# Patient Record
Sex: FEMALE | Race: WHITE
[De-identification: names, ages, dates, MRNs, and addresses within clinical notes are randomized per-mention and may not be internally consistent; named-entity substitution may affect disease eponyms.]

---

## 2018-01-24 ENCOUNTER — HOSPITAL ENCOUNTER (EMERGENCY)
Age: 73
Discharge: HOME | End: 2018-01-24
Payer: SELF-PAY

## 2018-01-24 VITALS
TEMPERATURE: 98.96 F | SYSTOLIC BLOOD PRESSURE: 168 MMHG | OXYGEN SATURATION: 95 % | RESPIRATION RATE: 18 BRPM | DIASTOLIC BLOOD PRESSURE: 81 MMHG | HEART RATE: 92 BPM

## 2018-01-24 VITALS — BODY MASS INDEX: 27.4 KG/M2

## 2018-01-24 DIAGNOSIS — Z79.4: ICD-10-CM

## 2018-01-24 DIAGNOSIS — E11.9: ICD-10-CM

## 2018-01-24 DIAGNOSIS — N30.00: Primary | ICD-10-CM

## 2018-01-24 LAB
ALBUMIN LEVEL: 3.6 GM/DL (ref 3.4–5)
ALBUMIN/GLOB SERPL: 0.9 {RATIO} (ref 1.1–1.8)
ALP ISO SERPL-ACNC: 38 U/L (ref 46–116)
ALT SERPLBLD-CCNC: 34 U/L (ref 12–78)
AMYLASE SERPL-CCNC: 52 U/L (ref 25–125)
ANION GAP SERPL CALC-SCNC: 14.9 MEQ/L (ref 5–15)
AST SERPL QL: 34 U/L (ref 15–37)
BACTERIA URNS QL MICRO: (no result) /LPF
BILIRUBIN,TOTAL: 0.4 MG/DL (ref 0.2–1)
BUN SERPL-MCNC: 12 MG/DL (ref 7–18)
CALCIUM SPEC-MCNC: 8.5 MG/DL (ref 8.5–10.1)
CHLORIDE SPEC-SCNC: 89 MMOL/L (ref 98–107)
CO2 SERPL-SCNC: 27 MMOL/L (ref 21–32)
COLOR UR: YELLOW
CREAT BLD-SCNC: 0.88 MG/DL (ref 0.55–1.02)
CREATININE CLEARANCE ESTIMATED: 58 ML/MIN (ref 0–300)
ESTIMATED GLOMERULAR FILT RATE: 63 ML/MIN (ref 60–?)
GFR (AFRICAN AMERICAN): 76 ML/MIN (ref 60–?)
GLOBULIN SER CALC-MCNC: 4 GM/DL (ref 1.3–3.2)
GLUCOSE: 136 MG/DL (ref 74–106)
HCT VFR BLD CALC: 36.9 % (ref 37–47)
HGB BLD-MCNC: 12.3 G/DL (ref 12.2–16.2)
KETONES UR STRIP.AUTO-MCNC: (no result) MG/DL
LEUKOCYTE ESTERASE UR QL STRIP: (no result)
LIPASE: 243 U/L (ref 73–393)
MCHC RBC-ENTMCNC: 33.4 G/DL (ref 31.8–35.4)
MCV RBC: 87.6 FL (ref 81–99)
MEAN CORPUSCULAR HEMOGLOBIN: 29.2 PG (ref 27–31.2)
MICRO URNS: (no result)
PH UR: 6 [PH] (ref 5–8.5)
PLATELET # BLD: 175 K/MM3 (ref 142–424)
POTASSIUM: 3.9 MMOL/L (ref 3.5–5.1)
PROT SERPL-MCNC: 7.6 GM/DL (ref 6.4–8.2)
RBC # BLD AUTO: 4.22 M/MM3 (ref 4.2–5.4)
SODIUM SPEC-SCNC: 127 MMOL/L (ref 136–145)
SP GR UR: 1.01 (ref 1–1.03)
TRANS CELLS URNS QL MICRO: (no result) #/LPF (ref 0–3)
UROBILINOGEN UR QL: 0.2 EU/DL
WBC # BLD AUTO: 5.2 K/MM3 (ref 4.8–10.8)
WBC # UR: (no result) #/HPF (ref 0–3)

## 2018-01-24 PROCEDURE — 99283 EMERGENCY DEPT VISIT LOW MDM: CPT

## 2018-01-24 PROCEDURE — 85025 COMPLETE CBC W/AUTO DIFF WBC: CPT

## 2018-01-24 PROCEDURE — 87086 URINE CULTURE/COLONY COUNT: CPT

## 2018-01-24 PROCEDURE — 96367 TX/PROPH/DG ADDL SEQ IV INF: CPT

## 2018-01-24 PROCEDURE — 74176 CT ABD & PELVIS W/O CONTRAST: CPT

## 2018-01-24 PROCEDURE — 87186 SC STD MICRODIL/AGAR DIL: CPT

## 2018-01-24 PROCEDURE — 87088 URINE BACTERIA CULTURE: CPT

## 2018-01-24 PROCEDURE — 80053 COMPREHEN METABOLIC PANEL: CPT

## 2018-01-24 PROCEDURE — 96375 TX/PRO/DX INJ NEW DRUG ADDON: CPT

## 2018-01-24 PROCEDURE — 81001 URINALYSIS AUTO W/SCOPE: CPT

## 2018-01-24 PROCEDURE — 96365 THER/PROPH/DIAG IV INF INIT: CPT

## 2018-01-24 PROCEDURE — 83690 ASSAY OF LIPASE: CPT

## 2018-01-24 PROCEDURE — 82150 ASSAY OF AMYLASE: CPT

## 2020-07-10 ENCOUNTER — INPATIENT HOSPITAL (OUTPATIENT)
Dept: RURAL HOSPITAL 5 | Facility: HOSPITAL | Age: 75
End: 2020-07-10

## 2020-07-10 DIAGNOSIS — K81.9 CHOLECYSTITIS, UNSPECIFIED: ICD-10-CM

## 2020-07-10 DIAGNOSIS — I10 ESSENTIAL (PRIMARY) HYPERTENSION: ICD-10-CM

## 2020-07-10 DIAGNOSIS — I42.9 CARDIOMYOPATHY, UNSPECIFIED: ICD-10-CM

## 2020-07-10 DIAGNOSIS — R94.5 ABNORMAL RESULTS OF LIVER FUNCTION STUDIES: ICD-10-CM

## 2020-07-10 DIAGNOSIS — E11.9 TYPE 2 DIABETES MELLITUS WITHOUT COMPLICATIONS: ICD-10-CM

## 2020-07-10 DIAGNOSIS — N18.9 CHRONIC KIDNEY DISEASE, UNSPECIFIED: ICD-10-CM

## 2020-07-10 PROCEDURE — 99253 IP/OBS CNSLTJ NEW/EST LOW 45: CPT | Performed by: INTERNAL MEDICINE

## 2020-09-24 ENCOUNTER — CONSULT (OUTPATIENT)
Dept: CARDIOLOGY | Facility: CLINIC | Age: 75
End: 2020-09-24

## 2020-09-24 VITALS
WEIGHT: 179 LBS | OXYGEN SATURATION: 98 % | BODY MASS INDEX: 27.13 KG/M2 | HEIGHT: 68 IN | DIASTOLIC BLOOD PRESSURE: 54 MMHG | SYSTOLIC BLOOD PRESSURE: 103 MMHG | HEART RATE: 128 BPM

## 2020-09-24 DIAGNOSIS — E78.2 MIXED HYPERLIPIDEMIA: ICD-10-CM

## 2020-09-24 DIAGNOSIS — I48.92 ATRIAL FLUTTER, UNSPECIFIED TYPE (HCC): Primary | ICD-10-CM

## 2020-09-24 DIAGNOSIS — Z87.74 HISTORY OF REPAIR OF CONGENITAL ATRIAL SEPTAL DEFECT (ASD): ICD-10-CM

## 2020-09-24 DIAGNOSIS — I10 ESSENTIAL HYPERTENSION: ICD-10-CM

## 2020-09-24 PROCEDURE — 93000 ELECTROCARDIOGRAM COMPLETE: CPT | Performed by: INTERNAL MEDICINE

## 2020-09-24 PROCEDURE — 99203 OFFICE O/P NEW LOW 30 MIN: CPT | Performed by: INTERNAL MEDICINE

## 2020-09-24 RX ORDER — ASPIRIN 81 MG/1
81 TABLET ORAL DAILY
COMMUNITY
End: 2021-01-18

## 2020-09-24 RX ORDER — INSULIN GLARGINE 100 [IU]/ML
50 INJECTION, SOLUTION SUBCUTANEOUS DAILY
COMMUNITY
End: 2022-07-15 | Stop reason: HOSPADM

## 2020-09-24 RX ORDER — TRAZODONE HYDROCHLORIDE 50 MG/1
50 TABLET ORAL NIGHTLY
Status: ON HOLD | COMMUNITY
End: 2020-10-06

## 2020-09-24 RX ORDER — LISINOPRIL 10 MG/1
10 TABLET ORAL DAILY
COMMUNITY
End: 2020-11-16 | Stop reason: DRUGHIGH

## 2020-09-24 RX ORDER — FUROSEMIDE 20 MG/1
40 TABLET ORAL DAILY
COMMUNITY
End: 2022-07-15 | Stop reason: HOSPADM

## 2020-09-24 RX ORDER — SULFAMETHOXAZOLE AND TRIMETHOPRIM 800; 160 MG/1; MG/1
800 TABLET ORAL DAILY
Status: ON HOLD | COMMUNITY
Start: 2020-09-19 | End: 2020-10-06

## 2020-09-24 NOTE — PROGRESS NOTES
[facility] at [facility]  INITIAL OFFICE CONSULT    Ivory Carr  : 1945  MRN:[id number]  Patient Address: [address] [address]    Date of Encounter: 2020    PCP: Christiane Peter, APRN  [address]  [facility] [address]  Referring MD: Christiane Peter    IDENTIFICATION: A 75 y.o. female resident of [place], [place]     Chief Complaint   Patient presents with   • Atrial Flutter     PROBLEM LIST:   1. Paroxysmal atrial flutter  a. Diagnosed approximately   b. ECV x3, last in 2020  c. Echo 2020: biatrial enlargement, normal LVSF with abnormal septal motion;  moderately enlarged RV with normal contractility, mild MR, mod TR  d. Recurrent atrial flutter with RVR August and 2020   2. History of surgical ASD repair, , [place]  3. Hypertension   4. Hyperlipidemia intolerant to Lipitor   5. DM2 with peripheral neuropathy   6. Varicose veins   7. Lumbar spine disease  8. Recent cholecystitis/pancreatitis, s/p cholecystectomy 2020     ALLERGIES: No Known Allergies    CURRENT MEDICATIONS:   •  aspirin 81 MG EC tablet, Take 81 mg by mouth Daily  •  furosemide (LASIX) 20 MG tablet, daily   •  insulin glargine (LANTUS) 100 UNIT/ML injection, Inject  under the skin into the appropriate area as directed Daily  •  lisinopril (PRINIVIL,ZESTRIL) 10 MG tablet, Take 10 mg by mouth Daily.  •  rivaroxaban (XARELTO) 20 MG tablet, Take 20 mg by mouth Daily.  •  traZODone (DESYREL) 50 MG tablet, Take 50 mg by mouth Every Night. Half tablet at night  •  sulfamethoxazole-trimethoprim (BACTRIM DS,SEPTRA DS) 800-160 MG per tablet, Take 800 mg by mouth Daily.    HPI: Mrs. Carr is a pleasant 76 y/o WF with above noted history who presents as a second opinion for PAF with RVR. She was diagnosed with atrial fibrillation/flutter about 3 years ago, and has been seen by Dr. De León. She has had 3 cardioversions, with last one in March, however she has had  "recurrent atrial flutter with RVR. She is symptomatic with fatigue/low energy and dyspnea as well as palpitations. She as well reports a remote history of surgical ASD repair in 1990 in [place]. She denies history of MI, CVA, DVT/PE or rheumatic fever. She is unable to do her housework due to her current symptoms. She has previously failed some medicines for Afib/flutter, however she cannot recall what these are at the current time. She believes she could not take Metoprolol due to hypotension. She denies tobacco, alcohol or drug use. No significant family history.     Cardiac Risk Factors include: advanced age (older than 55 for men, 65 for women), diabetes mellitus, dyslipidemia, hypertension and sedentary lifestyle    ROS: All systems have been reviewed and are negative with the exception of those mentioned in the HPI and problem list above.    Surgical History:   Past Surgical History:   Procedure Laterality Date   • CHOLECYSTECTOMY         Social History:   Social History     Socioeconomic History   • Marital status:    Tobacco Use   • Smoking status: Never Smoker   • Smokeless tobacco: Never Used   Substance and Sexual Activity   • Alcohol use: Never     Frequency: Never   • Drug use: Never   • Sexual activity: Defer     Family History:   Family History   Problem Relation Age of Onset   • Hypertension Mother    • Hyperlipidemia Mother        Objective     Vitals:    09/24/20 1235 09/24/20 1238 09/24/20 1240   BP: 95/72 96/59 103/54   BP Location: Right arm Left arm Left arm   Patient Position: Sitting Standing Standing   Pulse: (!) 128 (!) 130 (!) 128   SpO2: 98% 98%    Weight: 81.2 kg (179 lb)     Height: 172.7 cm (68\")       Body mass index is 27.22 kg/m².    PHYSICAL EXAM:  CONSTITUTIONAL: Well nourished, cooperative, in no acute distress  HEENT: Normocephalic, atraumatic, PERRLA, no JVD  CARDIOVASCULAR:  Regular rhythm and fast rate, no murmur, gallop, rub. Pedal pulses are not palpable however no " evidence of threatened tissue loss   RESPIRATORY: Clear to auscultation, normal respiratory effort, no wheezing, rales or ronchi  GI: Soft, nontender, normal bowel sounds  MUSCULOSKELETAL: No gross deformities, no edema  SKIN: Warm, dry. No bleeding, bruising or rash  NEUROLOGICAL: No focal deficits  PSYCHIATRIC: Normal mood and affect. Behavior is normal     Labs/Diagnostic Data  Labs 8/21/20:  CMP: Na 126, K 5.1, Cl 90, CO2 27, BUN 25, Cr 1.3, eGFR 40, Glucose 348, Alk Phos 49, AST 24, ALT 19  CBC: WBC 7, RBC 4.15, Hgb 11.7, Hct 35.7, Plt 217  Troponin: 0.01   09/2019  A1C: 9.8% 04/2020      ECG 12 Lead    Date/Time: 9/24/2020 1:36 PM  Performed by: Sheryl Baig PA-C  Authorized by: Sheryl Baig PA-C   Comparison: compared with previous ECG from 7/27/2020  Comparison to previous ECG: Aflutter with 2:1 block  Rhythm: atrial flutter  Rate: tachycardic  BPM: 128    Clinical impression: abnormal EKG  Comments: Aflutter with 2:1 AV block         Radiology Data:   CXR 9/18/20: Lingular infiltrate vs atelectasis along with borderline COPD     Assessment and Plan:     1. Aflutter with RVR: she needs EP consult for possible ablation. We discussed case with Dr. Garcia who was kind enough to see her today. After discussion with patient and her , decision to proceed with Aflutter ablation was made and we will arrange this as soon as able. Continue current medicines in the meantime.     Thank you for allowing me to participate in the care of Ivory Carr. Feel free to contact me directly with any further questions or concerns.      Scribed for Irvin Mccray MD by Sheryl Baig PA-C. 9/24/2020  13:56 EDT

## 2020-09-30 ENCOUNTER — PREP FOR SURGERY (OUTPATIENT)
Dept: OTHER | Facility: HOSPITAL | Age: 75
End: 2020-09-30

## 2020-09-30 DIAGNOSIS — I48.3 TYPICAL ATRIAL FLUTTER (HCC): Primary | ICD-10-CM

## 2020-09-30 RX ORDER — ONDANSETRON 2 MG/ML
4 INJECTION INTRAMUSCULAR; INTRAVENOUS EVERY 6 HOURS PRN
Status: CANCELLED | OUTPATIENT
Start: 2020-09-30

## 2020-09-30 RX ORDER — ACETAMINOPHEN 325 MG/1
650 TABLET ORAL EVERY 4 HOURS PRN
Status: CANCELLED | OUTPATIENT
Start: 2020-09-30

## 2020-09-30 RX ORDER — NITROGLYCERIN 0.4 MG/1
0.4 TABLET SUBLINGUAL
Status: CANCELLED | OUTPATIENT
Start: 2020-09-30

## 2020-09-30 RX ORDER — SODIUM CHLORIDE 0.9 % (FLUSH) 0.9 %
3 SYRINGE (ML) INJECTION EVERY 12 HOURS SCHEDULED
Status: CANCELLED | OUTPATIENT
Start: 2020-09-30

## 2020-09-30 RX ORDER — SODIUM CHLORIDE 0.9 % (FLUSH) 0.9 %
10 SYRINGE (ML) INJECTION AS NEEDED
Status: CANCELLED | OUTPATIENT
Start: 2020-09-30

## 2020-10-05 PROCEDURE — 99284 EMERGENCY DEPT VISIT MOD MDM: CPT

## 2020-10-05 RX ORDER — SODIUM CHLORIDE 0.9 % (FLUSH) 0.9 %
10 SYRINGE (ML) INJECTION AS NEEDED
Status: DISCONTINUED | OUTPATIENT
Start: 2020-10-05 | End: 2020-10-12 | Stop reason: HOSPADM

## 2020-10-06 ENCOUNTER — APPOINTMENT (OUTPATIENT)
Dept: CARDIOLOGY | Facility: HOSPITAL | Age: 75
End: 2020-10-06

## 2020-10-06 ENCOUNTER — HOSPITAL ENCOUNTER (INPATIENT)
Facility: HOSPITAL | Age: 75
LOS: 6 days | Discharge: HOME | End: 2020-10-12
Attending: EMERGENCY MEDICINE | Admitting: INTERNAL MEDICINE

## 2020-10-06 ENCOUNTER — APPOINTMENT (OUTPATIENT)
Dept: GENERAL RADIOLOGY | Facility: HOSPITAL | Age: 75
End: 2020-10-06

## 2020-10-06 ENCOUNTER — APPOINTMENT (OUTPATIENT)
Dept: CT IMAGING | Facility: HOSPITAL | Age: 75
End: 2020-10-06

## 2020-10-06 DIAGNOSIS — R11.0 NAUSEA: ICD-10-CM

## 2020-10-06 DIAGNOSIS — I07.1 SEVERE TRICUSPID REGURGITATION: ICD-10-CM

## 2020-10-06 DIAGNOSIS — N28.9 ACUTE RENAL INSUFFICIENCY: ICD-10-CM

## 2020-10-06 DIAGNOSIS — Z79.01 CHRONIC ANTICOAGULATION: ICD-10-CM

## 2020-10-06 DIAGNOSIS — Z86.39 HISTORY OF DIABETES MELLITUS: ICD-10-CM

## 2020-10-06 DIAGNOSIS — R60.1 ANASARCA: ICD-10-CM

## 2020-10-06 DIAGNOSIS — E87.1 HYPONATREMIA: ICD-10-CM

## 2020-10-06 DIAGNOSIS — Z87.74 HISTORY OF REPAIR OF CONGENITAL ATRIAL SEPTAL DEFECT (ASD): ICD-10-CM

## 2020-10-06 DIAGNOSIS — Z90.49 HISTORY OF CHOLECYSTECTOMY: ICD-10-CM

## 2020-10-06 DIAGNOSIS — N39.0 ACUTE UTI: Primary | ICD-10-CM

## 2020-10-06 DIAGNOSIS — I48.92 PAROXYSMAL ATRIAL FLUTTER (HCC): ICD-10-CM

## 2020-10-06 DIAGNOSIS — R18.8 OTHER ASCITES: ICD-10-CM

## 2020-10-06 DIAGNOSIS — R14.0 ABDOMINAL DISTENTION: ICD-10-CM

## 2020-10-06 PROBLEM — N18.9 CKD (CHRONIC KIDNEY DISEASE): Status: ACTIVE | Noted: 2020-10-06

## 2020-10-06 PROBLEM — E11.9 DM2 (DIABETES MELLITUS, TYPE 2): Status: ACTIVE | Noted: 2020-10-06

## 2020-10-06 PROBLEM — D64.9 ANEMIA: Status: ACTIVE | Noted: 2020-10-06

## 2020-10-06 PROBLEM — R79.89 ELEVATED SERUM CREATININE: Status: ACTIVE | Noted: 2020-10-06

## 2020-10-06 LAB
ALBUMIN SERPL-MCNC: 3.8 G/DL (ref 3.5–5.2)
ALBUMIN/GLOB SERPL: 1.2 G/DL
ALP SERPL-CCNC: 99 U/L (ref 39–117)
ALT SERPL W P-5'-P-CCNC: 14 U/L (ref 1–33)
ANION GAP SERPL CALCULATED.3IONS-SCNC: 10 MMOL/L (ref 5–15)
APTT PPP: 43.4 SECONDS (ref 24–37)
AST SERPL-CCNC: 23 U/L (ref 1–32)
BACTERIA UR QL AUTO: ABNORMAL /HPF
BASOPHILS # BLD AUTO: 0.02 10*3/MM3 (ref 0–0.2)
BASOPHILS NFR BLD AUTO: 0.3 % (ref 0–1.5)
BILIRUB SERPL-MCNC: 0.3 MG/DL (ref 0–1.2)
BILIRUB UR QL STRIP: NEGATIVE
BUN SERPL-MCNC: 29 MG/DL (ref 8–23)
BUN/CREAT SERPL: 24.6 (ref 7–25)
CALCIUM SPEC-SCNC: 8.8 MG/DL (ref 8.6–10.5)
CHLORIDE SERPL-SCNC: 83 MMOL/L (ref 98–107)
CLARITY UR: ABNORMAL
CO2 SERPL-SCNC: 27 MMOL/L (ref 22–29)
COLOR UR: YELLOW
CREAT SERPL-MCNC: 1.18 MG/DL (ref 0.57–1)
DEPRECATED RDW RBC AUTO: 44 FL (ref 37–54)
EOSINOPHIL # BLD AUTO: 0.1 10*3/MM3 (ref 0–0.4)
EOSINOPHIL NFR BLD AUTO: 1.3 % (ref 0.3–6.2)
ERYTHROCYTE [DISTWIDTH] IN BLOOD BY AUTOMATED COUNT: 14.9 % (ref 12.3–15.4)
GFR SERPL CREATININE-BSD FRML MDRD: 44 ML/MIN/1.73
GLOBULIN UR ELPH-MCNC: 3.3 GM/DL
GLUCOSE BLDC GLUCOMTR-MCNC: 158 MG/DL (ref 70–130)
GLUCOSE BLDC GLUCOMTR-MCNC: 298 MG/DL (ref 70–130)
GLUCOSE BLDC GLUCOMTR-MCNC: 330 MG/DL (ref 70–130)
GLUCOSE BLDC GLUCOMTR-MCNC: 332 MG/DL (ref 70–130)
GLUCOSE SERPL-MCNC: 217 MG/DL (ref 65–99)
GLUCOSE UR STRIP-MCNC: NEGATIVE MG/DL
HAV IGM SERPL QL IA: NORMAL
HBV CORE IGM SERPL QL IA: NORMAL
HBV SURFACE AG SERPL QL IA: NORMAL
HCT VFR BLD AUTO: 31.2 % (ref 34–46.6)
HCV AB SER DONR QL: NORMAL
HGB BLD-MCNC: 10.1 G/DL (ref 12–15.9)
HGB UR QL STRIP.AUTO: ABNORMAL
HOLD SPECIMEN: NORMAL
HOLD SPECIMEN: NORMAL
HYALINE CASTS UR QL AUTO: ABNORMAL /LPF
IMM GRANULOCYTES # BLD AUTO: 0.07 10*3/MM3 (ref 0–0.05)
IMM GRANULOCYTES NFR BLD AUTO: 0.9 % (ref 0–0.5)
INR PPP: 2.2 (ref 0.85–1.16)
KETONES UR QL STRIP: NEGATIVE
LDH SERPL-CCNC: 235 U/L (ref 135–214)
LEUKOCYTE ESTERASE UR QL STRIP.AUTO: ABNORMAL
LIPASE SERPL-CCNC: 89 U/L (ref 13–60)
LYMPHOCYTES # BLD AUTO: 1.69 10*3/MM3 (ref 0.7–3.1)
LYMPHOCYTES NFR BLD AUTO: 21.8 % (ref 19.6–45.3)
MAGNESIUM SERPL-MCNC: 2 MG/DL (ref 1.6–2.4)
MCH RBC QN AUTO: 26.5 PG (ref 26.6–33)
MCHC RBC AUTO-ENTMCNC: 32.4 G/DL (ref 31.5–35.7)
MCV RBC AUTO: 81.9 FL (ref 79–97)
MONOCYTES # BLD AUTO: 0.45 10*3/MM3 (ref 0.1–0.9)
MONOCYTES NFR BLD AUTO: 5.8 % (ref 5–12)
NEUTROPHILS NFR BLD AUTO: 5.44 10*3/MM3 (ref 1.7–7)
NEUTROPHILS NFR BLD AUTO: 69.9 % (ref 42.7–76)
NITRITE UR QL STRIP: NEGATIVE
NRBC BLD AUTO-RTO: 0 /100 WBC (ref 0–0.2)
NT-PROBNP SERPL-MCNC: 2518 PG/ML (ref 0–1800)
OSMOLALITY UR: 292 MOSM/KG (ref 300–1100)
PH UR STRIP.AUTO: <=5 [PH] (ref 5–8)
PLATELET # BLD AUTO: 342 10*3/MM3 (ref 140–450)
PMV BLD AUTO: 10.6 FL (ref 6–12)
POTASSIUM SERPL-SCNC: 4.6 MMOL/L (ref 3.5–5.2)
PROT SERPL-MCNC: 7.1 G/DL (ref 6–8.5)
PROT UR QL STRIP: ABNORMAL
PROTHROMBIN TIME: 24.2 SECONDS (ref 11.5–14)
RBC # BLD AUTO: 3.81 10*6/MM3 (ref 3.77–5.28)
RBC # UR: ABNORMAL /HPF
REF LAB TEST METHOD: ABNORMAL
SARS-COV-2 RNA RESP QL NAA+PROBE: NOT DETECTED
SODIUM SERPL-SCNC: 120 MMOL/L (ref 136–145)
SODIUM UR-SCNC: <20 MMOL/L
SP GR UR STRIP: 1.01 (ref 1–1.03)
SQUAMOUS #/AREA URNS HPF: ABNORMAL /HPF
UROBILINOGEN UR QL STRIP: ABNORMAL
WBC # BLD AUTO: 7.77 10*3/MM3 (ref 3.4–10.8)
WBC UR QL AUTO: ABNORMAL /HPF
WHOLE BLOOD HOLD SPECIMEN: NORMAL
WHOLE BLOOD HOLD SPECIMEN: NORMAL

## 2020-10-06 PROCEDURE — 83880 ASSAY OF NATRIURETIC PEPTIDE: CPT | Performed by: FAMILY MEDICINE

## 2020-10-06 PROCEDURE — 71045 X-RAY EXAM CHEST 1 VIEW: CPT

## 2020-10-06 PROCEDURE — 63710000001 INSULIN DETEMIR PER 5 UNITS: Performed by: INTERNAL MEDICINE

## 2020-10-06 PROCEDURE — 25010000002 CEFTRIAXONE PER 250 MG: Performed by: PHYSICIAN ASSISTANT

## 2020-10-06 PROCEDURE — 85025 COMPLETE CBC W/AUTO DIFF WBC: CPT | Performed by: EMERGENCY MEDICINE

## 2020-10-06 PROCEDURE — 99254 IP/OBS CNSLTJ NEW/EST MOD 60: CPT | Performed by: INTERNAL MEDICINE

## 2020-10-06 PROCEDURE — 93306 TTE W/DOPPLER COMPLETE: CPT | Performed by: INTERNAL MEDICINE

## 2020-10-06 PROCEDURE — 80074 ACUTE HEPATITIS PANEL: CPT | Performed by: FAMILY MEDICINE

## 2020-10-06 PROCEDURE — 83735 ASSAY OF MAGNESIUM: CPT | Performed by: FAMILY MEDICINE

## 2020-10-06 PROCEDURE — 25010000002 ONDANSETRON PER 1 MG: Performed by: NURSE PRACTITIONER

## 2020-10-06 PROCEDURE — 82962 GLUCOSE BLOOD TEST: CPT

## 2020-10-06 PROCEDURE — 63710000001 INSULIN LISPRO (HUMAN) PER 5 UNITS: Performed by: PHYSICIAN ASSISTANT

## 2020-10-06 PROCEDURE — 83690 ASSAY OF LIPASE: CPT | Performed by: EMERGENCY MEDICINE

## 2020-10-06 PROCEDURE — 84300 ASSAY OF URINE SODIUM: CPT | Performed by: PHYSICIAN ASSISTANT

## 2020-10-06 PROCEDURE — 85730 THROMBOPLASTIN TIME PARTIAL: CPT | Performed by: FAMILY MEDICINE

## 2020-10-06 PROCEDURE — 99223 1ST HOSP IP/OBS HIGH 75: CPT | Performed by: FAMILY MEDICINE

## 2020-10-06 PROCEDURE — 63710000001 INSULIN LISPRO (HUMAN) PER 5 UNITS: Performed by: INTERNAL MEDICINE

## 2020-10-06 PROCEDURE — 87086 URINE CULTURE/COLONY COUNT: CPT | Performed by: PHYSICIAN ASSISTANT

## 2020-10-06 PROCEDURE — 93306 TTE W/DOPPLER COMPLETE: CPT

## 2020-10-06 PROCEDURE — 83615 LACTATE (LD) (LDH) ENZYME: CPT | Performed by: FAMILY MEDICINE

## 2020-10-06 PROCEDURE — 87635 SARS-COV-2 COVID-19 AMP PRB: CPT | Performed by: PHYSICIAN ASSISTANT

## 2020-10-06 PROCEDURE — 80053 COMPREHEN METABOLIC PANEL: CPT | Performed by: EMERGENCY MEDICINE

## 2020-10-06 PROCEDURE — 97165 OT EVAL LOW COMPLEX 30 MIN: CPT

## 2020-10-06 PROCEDURE — 81001 URINALYSIS AUTO W/SCOPE: CPT | Performed by: EMERGENCY MEDICINE

## 2020-10-06 PROCEDURE — 85610 PROTHROMBIN TIME: CPT | Performed by: FAMILY MEDICINE

## 2020-10-06 PROCEDURE — 93005 ELECTROCARDIOGRAM TRACING: CPT | Performed by: PHYSICIAN ASSISTANT

## 2020-10-06 PROCEDURE — 83935 ASSAY OF URINE OSMOLALITY: CPT | Performed by: PHYSICIAN ASSISTANT

## 2020-10-06 PROCEDURE — 97161 PT EVAL LOW COMPLEX 20 MIN: CPT | Performed by: PHYSICAL THERAPIST

## 2020-10-06 PROCEDURE — 74176 CT ABD & PELVIS W/O CONTRAST: CPT

## 2020-10-06 PROCEDURE — 25010000002 FUROSEMIDE PER 20 MG: Performed by: PHYSICIAN ASSISTANT

## 2020-10-06 PROCEDURE — 87088 URINE BACTERIA CULTURE: CPT | Performed by: PHYSICIAN ASSISTANT

## 2020-10-06 PROCEDURE — 63710000001 INSULIN LISPRO (HUMAN) PER 5 UNITS

## 2020-10-06 PROCEDURE — 87186 SC STD MICRODIL/AGAR DIL: CPT | Performed by: PHYSICIAN ASSISTANT

## 2020-10-06 RX ORDER — ASPIRIN 81 MG/1
81 TABLET ORAL DAILY
Status: DISCONTINUED | OUTPATIENT
Start: 2020-10-06 | End: 2020-10-12 | Stop reason: HOSPADM

## 2020-10-06 RX ORDER — SODIUM CHLORIDE 0.9 % (FLUSH) 0.9 %
10 SYRINGE (ML) INJECTION EVERY 12 HOURS SCHEDULED
Status: DISCONTINUED | OUTPATIENT
Start: 2020-10-06 | End: 2020-10-12 | Stop reason: HOSPADM

## 2020-10-06 RX ORDER — FUROSEMIDE 10 MG/ML
40 INJECTION INTRAMUSCULAR; INTRAVENOUS ONCE
Status: COMPLETED | OUTPATIENT
Start: 2020-10-06 | End: 2020-10-06

## 2020-10-06 RX ORDER — DEXTROSE MONOHYDRATE 25 G/50ML
25 INJECTION, SOLUTION INTRAVENOUS
Status: DISCONTINUED | OUTPATIENT
Start: 2020-10-06 | End: 2020-10-12 | Stop reason: HOSPADM

## 2020-10-06 RX ORDER — SODIUM CHLORIDE 0.9 % (FLUSH) 0.9 %
10 SYRINGE (ML) INJECTION AS NEEDED
Status: DISCONTINUED | OUTPATIENT
Start: 2020-10-06 | End: 2020-10-12 | Stop reason: HOSPADM

## 2020-10-06 RX ORDER — ACETAMINOPHEN 325 MG/1
650 TABLET ORAL EVERY 4 HOURS PRN
Status: DISCONTINUED | OUTPATIENT
Start: 2020-10-06 | End: 2020-10-12 | Stop reason: HOSPADM

## 2020-10-06 RX ORDER — NICOTINE POLACRILEX 4 MG
15 LOZENGE BUCCAL
Status: DISCONTINUED | OUTPATIENT
Start: 2020-10-06 | End: 2020-10-12 | Stop reason: HOSPADM

## 2020-10-06 RX ORDER — ONDANSETRON 2 MG/ML
4 INJECTION INTRAMUSCULAR; INTRAVENOUS ONCE
Status: COMPLETED | OUTPATIENT
Start: 2020-10-06 | End: 2020-10-06

## 2020-10-06 RX ORDER — CHOLECALCIFEROL (VITAMIN D3) 125 MCG
5 CAPSULE ORAL NIGHTLY PRN
Status: DISCONTINUED | OUTPATIENT
Start: 2020-10-06 | End: 2020-10-12 | Stop reason: HOSPADM

## 2020-10-06 RX ORDER — FUROSEMIDE 20 MG/1
20 TABLET ORAL
Status: DISCONTINUED | OUTPATIENT
Start: 2020-10-06 | End: 2020-10-12 | Stop reason: HOSPADM

## 2020-10-06 RX ADMIN — RIVAROXABAN 20 MG: 20 TABLET, FILM COATED ORAL at 18:06

## 2020-10-06 RX ADMIN — SODIUM CHLORIDE, PRESERVATIVE FREE 10 ML: 5 INJECTION INTRAVENOUS at 12:08

## 2020-10-06 RX ADMIN — INSULIN DETEMIR 15 UNITS: 100 INJECTION, SOLUTION SUBCUTANEOUS at 20:46

## 2020-10-06 RX ADMIN — ONDANSETRON 4 MG: 2 INJECTION INTRAMUSCULAR; INTRAVENOUS at 05:52

## 2020-10-06 RX ADMIN — INSULIN LISPRO 4 UNITS: 100 INJECTION, SOLUTION INTRAVENOUS; SUBCUTANEOUS at 12:06

## 2020-10-06 RX ADMIN — CEFTRIAXONE SODIUM 1 G: 1 INJECTION, POWDER, FOR SOLUTION INTRAMUSCULAR; INTRAVENOUS at 02:27

## 2020-10-06 RX ADMIN — FUROSEMIDE 20 MG: 20 TABLET ORAL at 10:56

## 2020-10-06 RX ADMIN — FUROSEMIDE 20 MG: 20 TABLET ORAL at 18:06

## 2020-10-06 RX ADMIN — ASPIRIN 81 MG: 81 TABLET, COATED ORAL at 10:57

## 2020-10-06 RX ADMIN — INSULIN LISPRO 10 UNITS: 100 INJECTION, SOLUTION INTRAVENOUS; SUBCUTANEOUS at 17:34

## 2020-10-06 RX ADMIN — FUROSEMIDE 40 MG: 10 INJECTION, SOLUTION INTRAMUSCULAR; INTRAVENOUS at 03:47

## 2020-10-06 RX ADMIN — SODIUM CHLORIDE, PRESERVATIVE FREE 10 ML: 5 INJECTION INTRAVENOUS at 21:01

## 2020-10-06 NOTE — CONSULTS
[facility]    Referring Provider: No ref. provider found    Primary Care Provider: Christiane Peter APRN    Reason for Consultation: Ascites    Chief complaint : Abdominal distention    History of present illness:  Ivory Carr is a 75 y.o. female who is admitted with abdominal distention and ascites.  She has had abdominal distention for last 2 months.  Underwent cholecystectomy 2 months ago for the same issue however symptoms respect remain.  Apparently she had inflamed gallbladder at that time but no stones.  Complicated by skin infection.  She states that she is able unable to lay down flat without vomiting.  Has not had weight loss.  Does have lower extremity edema.  No alcohol. no tobacco.    She does have atrial fibrillation is scheduled for ablation on October 15.  She is on chronic anticoagulation.    Allergies:  Statins    Scheduled Meds:  aspirin, 81 mg, Oral, Daily  [START ON 10/7/2020] cefTRIAXone, 1 g, Intravenous, Q24H  furosemide, 20 mg, Oral, BID  insulin detemir, 15 Units, Subcutaneous, Q12H  insulin lispro, 0-14 Units, Subcutaneous, TID AC  rivaroxaban, 20 mg, Oral, Daily With Dinner  sodium chloride, 10 mL, Intravenous, Q12H         Infusions:       PRN Meds:  •  acetaminophen  •  dextrose  •  dextrose  •  glucagon (human recombinant)  •  melatonin  •  sodium chloride  •  [COMPLETED] Insert peripheral IV **AND** sodium chloride  •  sodium chloride    Home Meds:  Medications Prior to Admission   Medication Sig Dispense Refill Last Dose   • aspirin 81 MG EC tablet Take 81 mg by mouth Daily.      • furosemide (LASIX) 20 MG tablet Take 20 mg by mouth 2 (Two) Times a Day.      • insulin glargine (LANTUS) 100 UNIT/ML injection Inject  under the skin into the appropriate area as directed Daily.      • lisinopril (PRINIVIL,ZESTRIL) 10 MG tablet Take 10 mg by mouth Daily.      • rivaroxaban (XARELTO) 20 MG tablet Take 20 mg by mouth Daily.          ROS: Review of Systems   Constitutional:  "Negative for appetite change, chills, fever and unexpected weight change.   HENT: Negative for trouble swallowing.    Respiratory: Positive for shortness of breath.    Cardiovascular: Negative for chest pain.   Gastrointestinal: Positive for abdominal pain, constipation, nausea and vomiting.   All other systems reviewed and are negative.      PAST MED HX: Pt  has a past medical history of ASD (atrial septal defect), Atrial flutter (CMS/HCC) (9/24/2020), CKD (chronic kidney disease) (10/6/2020), Diabetes (CMS/HCC), and Hypertension.  PAST SURG HX: Pt  has a past surgical history that includes Cholecystectomy.  FAM HX: family history includes Hyperlipidemia in her mother; Hypertension in her mother.  SOC HX: Pt  reports that she has never smoked. She has never used smokeless tobacco. She reports that she does not drink alcohol or use drugs.    /100 (BP Location: Right arm, Patient Position: Lying)   Pulse 80   Temp 97.5 °F (36.4 °C) (Oral)   Resp 16   Ht 172.7 cm (68\")   Wt 85.7 kg (189 lb)   SpO2 100%   BMI 28.74 kg/m²     Physical Exam  Wt Readings from Last 3 Encounters:   10/06/20 85.7 kg (189 lb)   09/24/20 81.2 kg (179 lb)   ,body mass index is 28.74 kg/m².    General Well developed; well nourished; no acute distress.   ENT Good dentition.  Oral mucosa pink & moist without thrush or lesions.    Neck Neck supple; trachea midline. No thyromegaly  Resp CTA; no rhonchi, rales, or wheezes.  Respiration effort normal  CV RRR; ; no M/R/G. No lower extremity edema  GI Abd soft, distended and tight but not tender, normal active bowel sounds.  No abd hernia  Skin No rash; no lesions; no bruises.  Skin turgor normal  MSK No clubbing; no cyanosis.    Psych Oriented to time, place, and person.  Appropriate affect      Results Review:   I reviewed the patient's new clinical results.    Lab Results   Component Value Date    WBC 7.77 10/06/2020    HGB 10.1 (L) 10/06/2020    HCT 31.2 (L) 10/06/2020    MCV 81.9 " 10/06/2020     10/06/2020       Lab Results   Component Value Date    GLUCOSE 217 (H) 10/06/2020    BUN 29 (H) 10/06/2020    CREATININE 1.18 (H) 10/06/2020    EGFRIFNONA 44 (L) 10/06/2020    BCR 24.6 10/06/2020    CO2 27.0 10/06/2020    CALCIUM 8.8 10/06/2020    ALBUMIN 3.80 10/06/2020    AST 23 10/06/2020    ALT 14 10/06/2020     CT scan per radiology report.  Personally reviewed with radiologist.       FINDINGS:  There is a small right pleural effusion. There is atelectasis in the right lung base. There is atherosclerotic disease but no aortic aneurysm. No renal or ureteral stones are seen. There is no hydronephrosis. Unenhanced solid abdominal organs are grossly  normal. Gallbladder is surgically absent. There is a small amount of ascites in the abdomen and pelvis. Urinary bladder has a thickened wall concerning for cystitis. Solid pelvic organs are grossly normal. The appendix is normal. The colon is normal as  well. There is some gas in nondistended small bowel loops which may reflect a mild ileus. There is diffuse degenerative disease in the lumbar spine. There is mild anasarca.     IMPRESSION:  1. Thick-walled urinary bladder concerning for cystitis.  2. Small amount of ascites with generalized anasarca.  3. Mild ileus pattern in the small bowel with no definite obstruction. The appendix is normal.  4. No renal or ureteral stones. No hydronephrosis.  5. Small right pleural effusion.  6. Cholecystectomy.              Signer Name: Danielito Plascencia MD   Signed: 10/6/2020 2:06 AM   Workstation Name: [facility]    [facility]    Only minimal amount of ascites noted on CT scan.  Her ovaries not well seen.  Does have uterine fibroid.    Echocardiogram preliminary     Echocardiogram Findings    Left Ventricle Calculated left ventricular EF = 56.9% Normal left ventricular cavity size noted. Left ventricular wall thickness is consistent with concentric hypertrophy. All left ventricular  wall segments contract normally.   Right Ventricle The right ventricular cavity is dilated. Normal right ventricular wall thickness noted. Abnormal septal motion. Diastolic flattening of interventricular septum consistent with right ventricle volume overload.   Left Atrium Normal left atrial size and volume noted. Interatrial septal aneurysm present.  Saline test results are negative. ASD or PFO closure device in interatrial septum.   Right Atrium The right atrial cavity is dilated. Interatrial septum bows to left consistent with elevated right atrial pressure. The hepatic veins are dilated. Consider elevated right atrial pressure or severe tricuspid regurgitation. The inferior vena cava is dilated. IVC inspiratory collapse is absent.   Aortic Valve The aortic valve is abnormal in structure. The aortic valve exhibits sclerosis. There is calcification of the aortic valve. There is thickening of the aortic valve. The aortic valve was poorly visualized but appears trileaflet. No aortic valve regurgitation is present. The peak aortic velocity was measured in the apical view.   Mitral Valve Mitral annular calcification is present. There is calcification of the mitral valve. Mild mitral valve regurgitation is present.   Tricuspid Valve Tricuspid valve not well visualized. Severe tricuspid valve regurgitation is present. Estimated right ventricular systolic pressure from tricuspid regurgitation is normal (<35 mmHg). Calculated right ventricular systolic pressure from tricuspid regurgitation is 33 mmHg. Mild to moderate pulmonary hypertension is present.   Pulmonic Valve The pulmonic valve is structurally normal with no regurgitation or significant stenosis present.   Greater Vessels No dilation of the aortic root is present. No dilation of the sinuses of Valsalva is present. The inferior vena cava is dilated. IVC inspiratory collapse is absent.   Pericardium The pericardium is normal. There is no evidence of pericardial  effusion. . There is a left pleural effusion.                           ASSESSMENTS/PLANS    1.  Abdominal distention  2.  Mild pelvic ascites  3.  Anemia chronic normochromic normocytic  4.  Anasarca  5.  Atrial fibrillation on anticoagulation      Her CT scan shows a normal-sized liver with normal INR and platelet count which would go against intrinsic liver disease.  Her preliminary echo shows severe tricuspid regurgitation.   Final reading pending.    Will await thyroid studies.  Plan diagnostic paracentesis tomorrow.  Will obtain cell count differentia,l albumin, protein, as well as cytology.    Anemia profile        I discussed the patient's findings and my recommendations with patient and family    Bassem Moore MD  10/06/20  17:31 EDT

## 2020-10-06 NOTE — PLAN OF CARE
Problem: Adult Inpatient Plan of Care  Goal: Plan of Care Review  Recent Flowsheet Documentation  Taken 10/6/2020 1557 by Frida Parks OT  Progress: improving  Plan of Care Reviewed With: patient  Outcome Summary: OT evaluation completed. Pt. completed T/Fs with CGA and good safety. Pt. with increased abdominal pain while donning/doffing socks, issued reacher and sock aid to improve comfort with task. Pt. initially required Mod A and improved to Min A with AE. Recommend home with assist at discharge.

## 2020-10-06 NOTE — PROGRESS NOTES
Discharge Planning Assessment  [facility]     Patient Name: Ivory Carr  MRN: [id number]  Today's Date: 10/6/2020    Admit Date: 10/6/2020    Discharge Needs Assessment     Row Name 10/06/20 1349       Living Environment    Lives With  spouse    Name(s) of Who Lives With Patient  Lives with spouse    Current Living Arrangements  home/apartment/condo    Primary Care Provided by  self    Provides Primary Care For  no one    Family Caregiver if Needed  friend(s);spouse    Family Caregiver Names  Urmila Mehran (spouse) [phone]    Quality of Family Relationships  helpful;involved;supportive    Able to Return to Prior Arrangements  yes       Resource/Environmental Concerns    Resource/Environmental Concerns  none       Transition Planning    Patient/Family Anticipates Transition to  home with family    Patient/Family Anticipated Services at Transition         Discharge Needs Assessment    Readmission Within the Last 30 Days  no previous admission in last 30 days    Equipment Currently Used at Home  ramp;walker, rolling;shower chair;commode    Concerns to be Addressed  discharge planning    Anticipated Changes Related to Illness  none    Equipment Needed After Discharge  none    Provided Post Acute Provider List?  Refused    Refused Provider List Comment  Declines provider list at this time.        Discharge Plan     Row Name 10/06/20 4541       Plan    Plan  Home with family    Patient/Family in Agreement with Plan  yes    Plan Comments  Spoke with patient and spouse at bedside.  Patient states that she lives in [place].  She states that she is independent with ADLs, but spouse assists with meds and meals.  She states that she has a ramp, rolling walker shower chair and bedside commode at home  She is not current with home health or services.  She states that her PCP is Christiane CANDELARIA.  She states that she currently does not have insurance and is self pay.  She declines to be  "screened for Medicaid at this time, and states \"I do not quailfy/\"  She does not have prescription drug coverage, but states that she is able to afford medications.  Plans to discharge home with family when medically ready.  Will continue to follow for discharge planning.    Final Discharge Disposition Code  01 - home or self-care        Continued Care and Services - Admitted Since 10/6/2020    Coordination has not been started for this encounter.       Expected Discharge Date and Time     Expected Discharge Date Expected Discharge Time    Oct 8, 2020         Demographic Summary     Row Name 10/06/20 1348       General Information    Admission Type  inpatient    Arrived From  home    Referral Source  admission list    Reason for Consult  discharge planning    Preferred Language  English     Used During This Interaction  no    General Information Comments  PCP: Christiane CANDELARIA confirmed with patient        Functional Status     Row Name 10/06/20 1349       Functional Status    Usual Activity Tolerance  moderate    Current Activity Tolerance  moderate       Functional Status, IADL    Medications  assistive person    Meal Preparation  assistive person    Housekeeping  assistive person    Laundry  assistive person    Shopping  assistive person        Psychosocial    No documentation.       Abuse/Neglect    No documentation.       Legal    No documentation.       Substance Abuse    No documentation.       Patient Forms    No documentation.           Jenny Cortes RN    "

## 2020-10-06 NOTE — THERAPY EVALUATION
Patient Name: Ivory Carr  : 1945    MRN: [id number]                              Today's Date: 10/6/2020       Admit Date: 10/6/2020    Visit Dx:     ICD-10-CM ICD-9-CM   1. Acute UTI  N39.0 599.0   2. Nausea  R11.0 787.02   3. Abdominal distention  R14.0 787.3   4. Other ascites  R18.8 789.59   5. Paroxysmal atrial flutter (CMS/HCC)  I48.92 427.32   6. Chronic anticoagulation  Z79.01 V58.61   7. History of diabetes mellitus  Z86.39 V12.29   8. History of cholecystectomy  Z90.49 V45.79   9. Acute renal insufficiency  N28.9 593.9   10. Hyponatremia  E87.1 276.1     Patient Active Problem List   Diagnosis   • Atrial flutter (CMS/HCC)   • DM2 (diabetes mellitus, type 2) (CMS/HCC)   • Anasarca and ascites   • Elevated serum creatinine   • Hyponatremia   • Acute UTI (urinary tract infection)   • Anemia   • Acute UTI   • CKD (chronic kidney disease)     Past Medical History:   Diagnosis Date   • ASD (atrial septal defect)    • Atrial flutter (CMS/HCC) 2020    Added automatically from request for surgery [id number]   • CKD (chronic kidney disease) 10/6/2020   • Diabetes (CMS/HCC)    • Hypertension      Past Surgical History:   Procedure Laterality Date   • CHOLECYSTECTOMY       General Information     Row Name 10/06/20 1543          OT Time and Intention    Document Type  evaluation  -     Mode of Treatment  individual therapy;occupational therapy  -     Row Name 10/06/20 1543          General Information    Patient Profile Reviewed  yes  -LC     Prior Level of Function  independent:;all household mobility;transfer;ADL's  -LC     Existing Precautions/Restrictions  fall  -LC     Barriers to Rehab  none identified  -LC     Row Name 10/06/20 1543          Home Main Entrance    Number of Stairs, Main Entrance  -- Ramp  -LC     Row Name 10/06/20 1543          Stairs Within Home, Primary    Stairs, Within Home, Primary  Pt. has walkin shower with shower chair.  -     Row Name 10/06/20 1543          Cognition     Orientation Status (Cognition)  oriented x 4  -     Row Name 10/06/20 1543          Safety Issues, Functional Mobility    Safety Issues Affecting Function (Mobility)  ability to follow commands;awareness of need for assistance;safety precaution awareness;safety precautions follow-through/compliance  -     Impairments Affecting Function (Mobility)  endurance/activity tolerance;pain;strength  -       User Key  (r) = Recorded By, (t) = Taken By, (c) = Cosigned By    Initials Name Provider Type     Frida Parks OT Occupational Therapist        Mobility/ADL's     Row Name 10/06/20 1547          Bed Mobility    Comment (Bed Mobility)  Pt. received [facility] on arrival  -     Row Name 10/06/20 1547          Transfers    Transfers  sit-stand transfer  -     Comment (Transfers)  Demonstrated good hand placement and sequencing.  -     Sit-Stand [place] (Transfers)  contact guard;1 person assist  -[facility] Name 10/06/20 1547          Sit-Stand Transfer    Assistive Device (Sit-Stand Transfers)  walker, front-wheeled  -[facility] Name 10/06/20 1547          Functional Mobility    Functional Mobility- Ind. Level  contact guard assist  -     Functional Mobility- Device  other (see comments) HHA, Gait Belt  -     Functional Mobility-Distance (Feet)  15  -     Functional Mobility- Comment  No LOB, good safety awareness.  -     Row Name 10/06/20 1547          Activities of Daily Living    BADL Assessment/Intervention  lower body dressing  -[facility] Name 10/06/20 1547          Lower Body Dressing Assessment/Training    [place] Level (Lower Body Dressing)  doff;don;socks;minimum assist (75% patient effort);verbal cues  -     Assistive Devices (Lower Body Dressing)  reacher;sock-aid  -     Position (Lower Body Dressing)  unsupported sitting  -     Comment (Lower Body Dressing)  Pt. with abdominal discomfort while bending forward to reach feet. Pt. able to doff, required assist to don. Educated  pt. on use of AE to improve independence with donning/doffing socks. Completed with Min A with AE.  -     Row Name 10/06/20 1547          Bathing Assessment/Intervention    [place] Level (Bathing)  lower body  -LC     Position (Bathing)  unsupported sitting  -LC     Comment (Bathing)  Educated pt. on use LHS to assist with decreasing abdominal discomfort during LBB. Simulated good understanding.  -       User Key  (r) = Recorded By, (t) = Taken By, (c) = Cosigned By    Initials Name Provider Type     Frida Parks OT Occupational Therapist        Obj/Interventions     Row Name 10/06/20 1557          Balance    Balance Assessment  sitting static balance;sitting dynamic balance;standing static balance;standing dynamic balance  -     Static Sitting Balance  WFL  -LC     Dynamic Sitting Balance  WFL  -LC     Static Standing Balance  WFL  -LC     Dynamic Standing Balance  mild impairment  -       User Key  (r) = Recorded By, (t) = Taken By, (c) = Cosigned By    Initials Name Provider Type     Frida Parks OT Occupational Therapist        Goals/Plan     Row Name 10/06/20 1606          Transfer Goal 1 (OT)    Activity/Assistive Device (Transfer Goal 1, OT)  sit-to-stand/stand-to-sit  -     [place] Level/Cues Needed (Transfer Goal 1, OT)  standby assist  -     Time Frame (Transfer Goal 1, OT)  10 days;long term goal (LTG)  -     Progress/Outcome (Transfer Goal 1, OT)  goal ongoing  -     Row Name 10/06/20 1606          Bathing Goal 1 (OT)    Activity/Device (Bathing Goal 1, OT)  lower body bathing  -     [place] Level/Cues Needed (Bathing Goal 1, OT)  standby assist;verbal cues required  -     Time Frame (Bathing Goal 1, OT)  10 days;long term goal (LTG)  -     Progress/Outcomes (Bathing Goal 1, OT)  goal ongoing  -     Row Name 10/06/20 1606          Dressing Goal 1 (OT)    Activity/Device (Dressing Goal 1, OT)  lower body dressing;reacher;sock-aid  -     [place]/Cues  Needed (Dressing Goal 1, OT)  supervision required;verbal cues required  -     Time Frame (Dressing Goal 1, OT)  10 days;long term goal (LTG)  -     Progress/Outcome (Dressing Goal 1, OT)  goal ongoing  -     Row Name 10/06/20 1606          Toileting Goal 1 (OT)    Activity/Device (Toileting Goal 1, OT)  adjust/manage clothing;perform perineal hygiene;commode;grab bar/safety frame;raised toilet seat  -     [place] Level/Cues Needed (Toileting Goal 1, OT)  standby assist;verbal cues required  -     Time Frame (Toileting Goal 1, OT)  10 days;long term goal (LTG)  -       User Key  (r) = Recorded By, (t) = Taken By, (c) = Cosigned By    Initials Name Provider Type     Frida Parks, OT Occupational Therapist        Clinical Impression     Row Name 10/06/20 3689          Pain Scale: Numbers Pre/Post-Treatment    Pretreatment Pain Rating  0/10 - no pain  -LC     Posttreatment Pain Rating  0/10 - no pain  -LC     Pain Location - Orientation  generalized  -     Pain Location  abdomen  -     Pain Intervention(s)  Repositioned;Ambulation/increased activity  -     Row Name 10/06/20 4547          Plan of Care Review    Plan of Care Reviewed With  patient  -     Progress  improving  -     Outcome Summary  OT evaluation completed. Pt. completed T/Fs with CGA and good safety. Pt. with increased abdominal pain while donning/doffing socks, issued reacher and sock aid to improve comfort with task. Pt. initially required Mod A and improved to Min A with AE. Recommend home with assist at discharge.  -     Row Name 10/06/20 3956          Therapy Assessment/Plan (OT)    Patient/Family Therapy Goal Statement (OT)  To return to [facility]  -     Therapy Frequency (OT)  daily  -     Row Name 10/06/20 2960          Therapy Plan Review/Discharge Plan (OT)    Anticipated Discharge Disposition (OT)  home with assist  -     Row Name 10/06/20 3871          Vital Signs    Pre Systolic BP Rehab  -- VSS, Cleared with  RN for Tx  -LC     Pre Patient Position  Sitting  -LC     Intra Patient Position  Standing  -LC     Post Patient Position  Sitting  -LC     Row Name 10/06/20 1557          Positioning and Restraints    Pre-Treatment Position  sitting in chair/recliner  -LC     Post Treatment Position  chair  -LC     In Chair  notified nsg;reclined;call light within reach  -       User Key  (r) = Recorded By, (t) = Taken By, (c) = Cosigned By    Initials Name Provider Type    Frida Kelley OT Occupational Therapist        Outcome Measures     Row Name 10/06/20 1609          How much help from another is currently needed...    Putting on and taking off regular lower body clothing?  3  -LC     Bathing (including washing, rinsing, and drying)  3  -LC     Toileting (which includes using toilet bed pan or urinal)  3  -LC     Putting on and taking off regular upper body clothing  4  -LC     Taking care of personal grooming (such as brushing teeth)  4  -LC     Eating meals  4  -     AM-PAC 6 Clicks Score (OT)  21  -     Row Name 10/06/20 1609          Functional Assessment    Outcome Measure Options  AM-PAC 6 Clicks Daily Activity (OT)  -       User Key  (r) = Recorded By, (t) = Taken By, (c) = Cosigned By    Initials Name Provider Type    Frida Kelley OT Occupational Therapist        Occupational Therapy Education                 Title: PT OT SLP Therapies (In Progress)     Topic: Occupational Therapy (In Progress)     Point: ADL training (Done)     Description:   Instruct learner(s) on proper safety adaptation and remediation techniques during self care or transfers.   Instruct in proper use of assistive devices.              Learning Progress Summary           Patient Acceptance, E,D, VU,NR by  at 10/6/2020 1420                   Point: Home exercise program (Not Started)     Description:   Instruct learner(s) on appropriate technique for monitoring, assisting and/or progressing therapeutic exercises/activities.               Learner Progress:  Not documented in this visit.          Point: Precautions (Done)     Description:   Instruct learner(s) on prescribed precautions during self-care and functional transfers.              Learning Progress Summary           Patient Acceptance, E,D, VU,NR by  at 10/6/2020 1420                   Point: Body mechanics (Done)     Description:   Instruct learner(s) on proper positioning and spine alignment during self-care, functional mobility activities and/or exercises.              Learning Progress Summary           Patient Acceptance, E,D, VU,NR by  at 10/6/2020 1420                               User Key     Initials Effective Dates Name Provider Type Discipline     07/18/19 -  Frida Parks, OT Occupational Therapist OT              OT Recommendation and Plan  Retired Outcome Summary/Treatment Plan (OT)  Anticipated Discharge Disposition (OT): home with assist  Therapy Frequency (OT): daily  Plan of Care Review  Plan of Care Reviewed With: patient  Progress: improving  Outcome Summary: OT evaluation completed. Pt. completed T/Fs with CGA and good safety. Pt. with increased abdominal pain while donning/doffing socks, issued reacher and sock aid to improve comfort with task. Pt. initially required Mod A and improved to Min A with AE. Recommend home with assist at discharge.  Plan of Care Reviewed With: patient  Outcome Summary: OT evaluation completed. Pt. completed T/Fs with CGA and good safety. Pt. with increased abdominal pain while donning/doffing socks, issued reacher and sock aid to improve comfort with task. Pt. initially required Mod A and improved to Min A with AE. Recommend home with assist at discharge.     Time Calculation:   Time Calculation- OT     Row Name 10/06/20 1611             Time Calculation- OT    OT Start Time  1420  -      OT Received On  10/06/20  -      OT Goal Re-Cert Due Date  10/16/20  -        User Key  (r) = Recorded By, (t) = Taken By, (c) =  Cosigned By    Initials Name Provider Type     Frida Parks OT Occupational Therapist        Therapy Charges for Today     Code Description Service Date Service Provider Modifiers Qty    06175619334 HC OT EVAL LOW COMPLEXITY 4 10/6/2020 Frida Parks OT GO 1               Frida Parks OT  10/6/2020

## 2020-10-06 NOTE — THERAPY EVALUATION
Patient Name: Ivory Carr  : 1945    MRN: [id number]                              Today's Date: 10/6/2020       Admit Date: 10/6/2020    Visit Dx:     ICD-10-CM ICD-9-CM   1. Acute UTI  N39.0 599.0   2. Nausea  R11.0 787.02   3. Abdominal distention  R14.0 787.3   4. Other ascites  R18.8 789.59   5. Paroxysmal atrial flutter (CMS/HCC)  I48.92 427.32   6. Chronic anticoagulation  Z79.01 V58.61   7. History of diabetes mellitus  Z86.39 V12.29   8. History of cholecystectomy  Z90.49 V45.79   9. Acute renal insufficiency  N28.9 593.9   10. Hyponatremia  E87.1 276.1     Patient Active Problem List   Diagnosis   • Atrial flutter (CMS/HCC)   • DM2 (diabetes mellitus, type 2) (CMS/HCC)   • Anasarca and ascites   • Elevated serum creatinine   • Hyponatremia   • Acute UTI (urinary tract infection)   • Anemia   • Acute UTI   • CKD (chronic kidney disease)     Past Medical History:   Diagnosis Date   • ASD (atrial septal defect)    • Atrial flutter (CMS/HCC) 2020    Added automatically from request for surgery [id number]   • CKD (chronic kidney disease) 10/6/2020   • Diabetes (CMS/HCC)    • Hypertension      Past Surgical History:   Procedure Laterality Date   • CHOLECYSTECTOMY       General Information     Row Name 10/06/20 1056          Physical Therapy Time and Intention    Document Type  evaluation  -LM     Mode of Treatment  individual therapy;physical therapy  -     Row Name 10/06/20 1051          General Information    Patient Profile Reviewed  yes  -LM     Prior Level of Function  independent:;all household mobility;gait;ADL's Prior to 2 weeks ago, pt independent with all mobility and ADL's.  The past 2 weeks, pt has needed assist at times from her daughter with bathing and dressing.  -LM     Existing Precautions/Restrictions  fall  -LM     Barriers to Rehab  none identified  -LM     Row Name 10/06/20 1053          Living Environment    Lives With  spouse  and daughter  -LM     Row Name 10/06/20  1053          Home Main Entrance    Number of Stairs, Main Entrance  -- Entry Ramp  -LM     Row Name 10/06/20 1053          Stairs Within Home, Primary    Stairs, Within Home, Primary  Has a 2nd level, but pt stays on the first.  States only her grandchildren use the second level.  -LM     Row Name 10/06/20 1053          Cognition    Orientation Status (Cognition)  oriented x 4  -LM     Row Name 10/06/20 1053          Safety Issues, Functional Mobility    Safety Issues Affecting Function (Mobility)  --  -LM     Impairments Affecting Function (Mobility)  endurance/activity tolerance;pain;strength  -LM       User Key  (r) = Recorded By, (t) = Taken By, (c) = Cosigned By    Initials Name Provider Type    Celena Sherman PT Physical Therapist        Mobility     Row Name 10/06/20 1053          Bed Mobility    Comment (Bed Mobility)  Pt sitting EOB upon entering room.  -LM     Row Name 10/06/20 1053          Transfers    Comment (Transfers)  Good hand placement noted.  -LM     Row Name 10/06/20 1053          Sit-Stand Transfer    Sit-Stand [place] (Transfers)  contact guard;1 person assist  -LM     Assistive Device (Sit-Stand Transfers)  walker, front-wheeled  -LM     Row Name 10/06/20 1053          Gait/Stairs (Locomotion)    [place] Level (Gait)  contact guard;1 person assist  -LM     Assistive Device (Gait)  walker, front-wheeled  -LM     Distance in Feet (Gait)  120 feet  -LM     Deviations/Abnormal Patterns (Gait)  jack decreased;stride length decreased  -LM     Bilateral Gait Deviations  forward flexed posture  -LM     Comment (Gait/Stairs)  Vc's for upright posture and to stay inside walker.  Towards the end of gait, pt began to fatigue reporting her arms and legs were feeling weak, but pt able to make it to the chair safely.  -LM       User Key  (r) = Recorded By, (t) = Taken By, (c) = Cosigned By    Initials Name Provider Type    Celena Sherman PT Physical Therapist        Obj/Interventions      Row Name 10/06/20 1053          Range of Motion Comprehensive    General Range of Motion  bilateral lower extremity ROM WFL  -LM     Row Name 10/06/20 1053          Strength Comprehensive (MMT)    General Manual Muscle Testing (MMT) Assessment  no strength deficits identified BLEs  -LM     Row Name 10/06/20 1053          Balance    Balance Assessment  sitting static balance;standing static balance;standing dynamic balance  -LM     Static Sitting Balance  WFL  -LM     Static Standing Balance  WFL  -LM     Dynamic Standing Balance  mild impairment  -LM     Row Name 10/06/20 1053          Sensory Assessment (Somatosensory)    Sensory Assessment (Somatosensory)  LE sensation intact  -LM       User Key  (r) = Recorded By, (t) = Taken By, (c) = Cosigned By    Initials Name Provider Type    LM Celena Rubio, PT Physical Therapist        Goals/Plan     Row Name 10/06/20 1053          Bed Mobility Goal 1 (PT)    Activity/Assistive Device (Bed Mobility Goal 1, PT)  sit to supine/supine to sit  -LM     [place] Level/Cues Needed (Bed Mobility Goal 1, PT)  independent  -LM     Time Frame (Bed Mobility Goal 1, PT)  long term goal (LTG);10 days  -LM     Row Name 10/06/20 1053          Transfer Goal 1 (PT)    Activity/Assistive Device (Transfer Goal 1, PT)  bed-to-chair/chair-to-bed  -LM     [place] Level/Cues Needed (Transfer Goal 1, PT)  modified independence  -LM     Time Frame (Transfer Goal 1, PT)  long term goal (LTG);10 days  -LM     Row Name 10/06/20 1053          Gait Training Goal 1 (PT)    Activity/Assistive Device (Gait Training Goal 1, PT)  gait (walking locomotion);assistive device use  -LM     [place] Level (Gait Training Goal 1, PT)  modified independence  -LM     Distance (Gait Training Goal 1, PT)  200 feet  -LM     Time Frame (Gait Training Goal 1, PT)  long term goal (LTG);10 days  -LM       User Key  (r) = Recorded By, (t) = Taken By, (c) = Cosigned By    Initials Name Provider Type    LM  Celena Rubio, PT Physical Therapist        Clinical Impression     Row Name 10/06/20 1053          Pain    Additional Documentation  Pain Scale: Numbers Pre/Post-Treatment (Group)  -     Row Name 10/06/20 1053          Pain Scale: Numbers Pre/Post-Treatment    Pretreatment Pain Rating  4/10  -LM     Posttreatment Pain Rating  4/10  -LM     Pain Location - Orientation  generalized  -LM     Pain Location  abdomen  -LM     Pain Intervention(s)  Repositioned;Ambulation/increased activity  -     Row Name 10/06/20 1053          Plan of Care Review    Plan of Care Reviewed With  patient  -LM     Outcome Summary  PT evaluation completed.  Pt completed sit<-->stand with CGA and ambulated 120 feet using rw with CGAx1 - no unsteadiness noted, but pt fatigued by end of gait reporting her upper and lower extremities were feeling weak.  Skilled PT services warranted to improve mobility and safety.  Recommend home with assist.  -     Row Name 10/06/20 1053          Therapy Assessment/Plan (PT)    Rehab Potential (PT)  good, to achieve stated therapy goals  -     Criteria for Skilled Interventions Met (PT)  yes;meets criteria;skilled treatment is necessary  -     Row Name 10/06/20 1053          Vital Signs    Pre Systolic BP Rehab  121  -LM     Pre Treatment Diastolic BP  76  -LM     Pretreatment Heart Rate (beats/min)  59  -LM     Posttreatment Heart Rate (beats/min)  62  -LM     Post SpO2 (%)  97  -LM     O2 Delivery Post Treatment  room air  -LM     Pre Patient Position  Sitting  -LM     Post Patient Position  Sitting  -     Row Name 10/06/20 1053          Positioning and Restraints    Pre-Treatment Position  in bed  -LM     Post Treatment Position  chair  -LM     In Chair  reclined;call light within reach;encouraged to call for assist;with family/caregiver;notified nsg  -       User Key  (r) = Recorded By, (t) = Taken By, (c) = Cosigned By    Initials Name Provider Type    LM Celena Rubio, PT Physical Therapist         Outcome Measures     Row Name 10/06/20 1053          How much help from another person do you currently need...    Turning from your back to your side while in flat bed without using bedrails?  4  -LM     Moving from lying on back to sitting on the side of a flat bed without bedrails?  3  -LM     Moving to and from a bed to a chair (including a wheelchair)?  3  -LM     Standing up from a chair using your arms (e.g., wheelchair, bedside chair)?  3  -LM     Climbing 3-5 steps with a railing?  3  -LM     To walk in hospital room?  3  -LM     AM-PAC 6 Clicks Score (PT)  19  -LM     Row Name 10/06/20 1053          Functional Assessment    Outcome Measure Options  AM-PAC 6 Clicks Basic Mobility (PT)  -       User Key  (r) = Recorded By, (t) = Taken By, (c) = Cosigned By    Initials Name Provider Type    Celena Sherman, PT Physical Therapist        Physical Therapy Education                 Title: PT OT SLP Therapies (In Progress)     Topic: Physical Therapy (In Progress)     Point: Mobility training (Done)     Learning Progress Summary           Patient Acceptance, E, VU by  at 10/6/2020 1142                   Point: Home exercise program (Not Started)     Learner Progress:  Not documented in this visit.          Point: Precautions (Done)     Learning Progress Summary           Patient Acceptance, E, VU by  at 10/6/2020 1142                               User Key     Initials Effective Dates Name Provider Type Discipline     07/24/19 -  Celena Rubio, PT Physical Therapist PT              PT Recommendation and Plan  Planned Therapy Interventions (PT): balance training, bed mobility training, gait training, home exercise program, motor coordination training, neuromuscular re-education, patient/family education, postural re-education, ROM (range of motion), strengthening, stretching, transfer training  Plan of Care Reviewed With: patient  Outcome Summary: PT evaluation completed.  Pt completed sit<-->stand  with CGA and ambulated 120 feet using rw with CGAx1 - no unsteadiness noted, but pt fatigued by end of gait reporting her upper and lower extremities were feeling weak.  Skilled PT services warranted to improve mobility and safety.  Recommend home with assist.     Time Calculation:   PT Charges     Row Name 10/06/20 1053             Time Calculation    Start Time  1053  -LM      PT Received On  10/06/20  -LM      PT Goal Re-Cert Due Date  10/16/20  -LM        User Key  (r) = Recorded By, (t) = Taken By, (c) = Cosigned By    Initials Name Provider Type    Celena Sherman, PT Physical Therapist        Therapy Charges for Today     Code Description Service Date Service Provider Modifiers Qty    27474304085 HC PT EVAL LOW COMPLEXITY 4 10/6/2020 Celena Rubio, PT GP 1          PT G-Codes  Outcome Measure Options: AM-PAC 6 Clicks Basic Mobility (PT)  AM-PAC 6 Clicks Score (PT): 19    Celena Rubio PT  10/6/2020

## 2020-10-06 NOTE — ED PROVIDER NOTES
Subjective   This is a 75-year-old female that presents to the ER with persistent generalized abdominal pain, bloating, fullness, for the last several weeks.  Patient reports history of cholecystectomy in August, 2020.  She denied any postoperative complications.  She says that she has just not felt well since the surgery.  She reports persistent anorexia with intermittent nausea.  She reports chills but denies any fever.  She has 1-2 loose bowel movements daily.  She denies any blood or mucus.  She denies any previous colonoscopy in the past.  She denies any chest pain or shortness of breath.  She does have chronic lower extremity pedal edema and takes Lasix 20 mg by mouth twice daily.  She denies any known history of congestive heart failure.  She denies any alcohol use.  She reports urinary urgency with frequency and scant amounts of urine.  She has had recurrent urinary tract infections.  Patient also has history of paroxysmal atrial flutter which was diagnosed in 2017.  She is scheduled for ablation by Dr. Irvin Mccray on 10/15/2020.  She is on Xarelto daily for anticoagulation.  Patient denies any cough, congestion, or COVID-19 symptoms.  No other concerns at this time.    PMH: Paroxysmal Atrial Flutter dx in 2017, on Xarelto, History of surgical ASD repair in 1990, HTN, Hyperlpidemia, DM2 with peripheral neuropathy, cholecystitis with cholecystectomy in 8/2020.      History provided by:  Patient  Abdominal Pain  Pain location:  Generalized  Pain quality: bloating and fullness    Pain quality comment:  Tightness  Duration: 10-12.  Timing:  Constant  Chronicity:  Recurrent  Relieved by:  Nothing  Worsened by:  Nothing  Ineffective treatments:  None tried  Associated symptoms: anorexia, chills, diarrhea (2 small, loose BMs daily), fatigue and nausea    Associated symptoms: no constipation, no dysuria, no fever, no hematochezia, no hematuria, no shortness of breath and no vomiting    Risk factors comment:  No  history of colonoscopy.      Review of Systems   Constitutional: Positive for appetite change, chills and fatigue. Negative for diaphoresis and fever.   Respiratory: Negative for shortness of breath.    Cardiovascular: Positive for leg swelling (chronic pedal edema.).        History of Atrial flutter; on Xarelto.  Scheduled for ablation on 10/15/20 per Dr. Mccray.   Gastrointestinal: Positive for abdominal distention, abdominal pain, anorexia, diarrhea (2 small, loose BMs daily) and nausea. Negative for blood in stool, constipation, hematochezia and vomiting.   Genitourinary: Positive for frequency. Negative for dysuria, flank pain, hematuria and urgency.   Musculoskeletal: Negative.  Negative for back pain.   Neurological: Positive for weakness (generalized weakness). Negative for dizziness, light-headedness and headaches.   All other systems reviewed and are negative.      Past Medical History:   Diagnosis Date   • ASD (atrial septal defect)    • Diabetes (CMS/HCC)    • Hypertension        Allergies   Allergen Reactions   • Statins Myalgia       Past Surgical History:   Procedure Laterality Date   • CHOLECYSTECTOMY         Family History   Problem Relation Age of Onset   • Hypertension Mother    • Hyperlipidemia Mother        Social History     Socioeconomic History   • Marital status:      Spouse name: Not on file   • Number of children: Not on file   • Years of education: Not on file   • Highest education level: Not on file   Tobacco Use   • Smoking status: Never Smoker   • Smokeless tobacco: Never Used   Substance and Sexual Activity   • Alcohol use: Never     Frequency: Never   • Drug use: Never   • Sexual activity: Defer           Objective   Physical Exam  Vitals signs and nursing note reviewed.   Constitutional:       Appearance: Normal appearance.      Comments: Appears ill.   HENT:      Head: Normocephalic and atraumatic.      Right Ear: Tympanic membrane normal.      Left Ear: Tympanic membrane  normal.      Nose: Nose normal.      Mouth/Throat:      Mouth: Mucous membranes are moist.      Pharynx: Oropharynx is clear.   Eyes:      Extraocular Movements: Extraocular movements intact.      Conjunctiva/sclera: Conjunctivae normal.      Pupils: Pupils are equal, round, and reactive to light.   Neck:      Musculoskeletal: Normal range of motion and neck supple.   Cardiovascular:      Rate and Rhythm: Normal rate and regular rhythm.      Pulses: Normal pulses.      Heart sounds: Normal heart sounds.      Comments: Trace pitting edema to bilateral lower extremities.  Pulmonary:      Effort: Pulmonary effort is normal.      Breath sounds: Normal breath sounds.   Abdominal:      General: Bowel sounds are decreased. There is distension.      Palpations: Abdomen is soft.      Tenderness: There is generalized abdominal tenderness. There is no right CVA tenderness, left CVA tenderness, guarding or rebound.      Comments: Abdominal distention with generalized TTP.  Decreased bowel sounds.  No flank or CVA TTP.   Musculoskeletal: Normal range of motion.   Skin:     General: Skin is warm and dry.   Neurological:      General: No focal deficit present.      Mental Status: She is alert.      Cranial Nerves: Cranial nerves are intact.      Sensory: Sensation is intact.      Motor: Motor function is intact.      Comments: Generalized weakness.         Procedures           ED Course  ED Course as of Oct 06 0411   Tue Oct 06, 2020   0135 Pt brings medical records from admission at [facility] from 7/9/20 through 7/12/20 for abdominal pain, UTI.  Cholecystectomy was later in August, 2020 per  and patient.  No prior history of colonoscopy.    [FC]   0408 CBC revealed white blood cell count is 7000 with normal differential.  Chemistries showed BUN and creatinine at 29 and 1.18.  Sodium was 120.  Urinalysis reveals too numerous to count white blood cells, 3+ bacteria and large 3+ leukocytes.  Urine culture is  in process.  Hepatitis panel is nonreactive.  LFTs were within normal limits.  Lipase is 89.  BNP was 2518.  LDH is 235.  Chest x-ray shows small right pleural effusion with right basilar atelectasis and cardiomegaly.  CT of the abdomen and pelvis without contrast shows thick-walled urinary bladder concerning for cystitis.  Small amount of ascites with generalized anasarca.  Mild ileus pattern in the small bowel with no evidence of obstruction.  Small right pleural effusion.  Status post cholecystectomy.  No renal or ureteral stones and no hydronephrosis.  Discussed admission with Dr. Narayan.  She is agreeable to admission on telemetry.  Vital signs are stable.  She will consult GI due to ascites and will closely follow electrolyte abnormalities.    [FC]      ED Course User Index  [FC] Lauren Tavares, LIDIA                 Recent Results (from the past 24 hour(s))   Comprehensive Metabolic Panel    Collection Time: 10/06/20 12:52 AM    Specimen: Blood   Result Value Ref Range    Glucose 217 (H) 65 - 99 mg/dL    BUN 29 (H) 8 - 23 mg/dL    Creatinine 1.18 (H) 0.57 - 1.00 mg/dL    Sodium 120 (L) 136 - 145 mmol/L    Potassium 4.6 3.5 - 5.2 mmol/L    Chloride 83 (L) 98 - 107 mmol/L    CO2 27.0 22.0 - 29.0 mmol/L    Calcium 8.8 8.6 - 10.5 mg/dL    Total Protein 7.1 6.0 - 8.5 g/dL    Albumin 3.80 3.50 - 5.20 g/dL    ALT (SGPT) 14 1 - 33 U/L    AST (SGOT) 23 1 - 32 U/L    Alkaline Phosphatase 99 39 - 117 U/L    Total Bilirubin 0.3 0.0 - 1.2 mg/dL    eGFR Non African Amer 44 (L) >60 mL/min/1.73    Globulin 3.3 gm/dL    A/G Ratio 1.2 g/dL    BUN/Creatinine Ratio 24.6 7.0 - 25.0    Anion Gap 10.0 5.0 - 15.0 mmol/L   Lipase    Collection Time: 10/06/20 12:52 AM    Specimen: Blood   Result Value Ref Range    Lipase 89 (H) 13 - 60 U/L   Light Blue Top    Collection Time: 10/06/20 12:52 AM   Result Value Ref Range    Extra Tube hold for add-on    Green Top (Gel)    Collection Time: 10/06/20 12:52 AM   Result Value Ref Range    Extra  Tube Hold for add-ons.    Lavender Top    Collection Time: 10/06/20 12:52 AM   Result Value Ref Range    Extra Tube hold for add-on    Gold Top - SST    Collection Time: 10/06/20 12:52 AM   Result Value Ref Range    Extra Tube Hold for add-ons.    CBC Auto Differential    Collection Time: 10/06/20 12:52 AM    Specimen: Blood   Result Value Ref Range    WBC 7.77 3.40 - 10.80 10*3/mm3    RBC 3.81 3.77 - 5.28 10*6/mm3    Hemoglobin 10.1 (L) 12.0 - 15.9 g/dL    Hematocrit 31.2 (L) 34.0 - 46.6 %    MCV 81.9 79.0 - 97.0 fL    MCH 26.5 (L) 26.6 - 33.0 pg    MCHC 32.4 31.5 - 35.7 g/dL    RDW 14.9 12.3 - 15.4 %    RDW-SD 44.0 37.0 - 54.0 fl    MPV 10.6 6.0 - 12.0 fL    Platelets 342 140 - 450 10*3/mm3    Neutrophil % 69.9 42.7 - 76.0 %    Lymphocyte % 21.8 19.6 - 45.3 %    Monocyte % 5.8 5.0 - 12.0 %    Eosinophil % 1.3 0.3 - 6.2 %    Basophil % 0.3 0.0 - 1.5 %    Immature Grans % 0.9 (H) 0.0 - 0.5 %    Neutrophils, Absolute 5.44 1.70 - 7.00 10*3/mm3    Lymphocytes, Absolute 1.69 0.70 - 3.10 10*3/mm3    Monocytes, Absolute 0.45 0.10 - 0.90 10*3/mm3    Eosinophils, Absolute 0.10 0.00 - 0.40 10*3/mm3    Basophils, Absolute 0.02 0.00 - 0.20 10*3/mm3    Immature Grans, Absolute 0.07 (H) 0.00 - 0.05 10*3/mm3    nRBC 0.0 0.0 - 0.2 /100 WBC   Lactate Dehydrogenase    Collection Time: 10/06/20 12:52 AM    Specimen: Blood   Result Value Ref Range     (H) 135 - 214 U/L   Hepatitis Panel, Acute    Collection Time: 10/06/20 12:52 AM    Specimen: Blood   Result Value Ref Range    Hepatitis B Surface Ag Non-Reactive Non-Reactive    Hep A IgM Non-Reactive Non-Reactive    Hep B C IgM Non-Reactive Non-Reactive    Hepatitis C Ab Non-Reactive Non-Reactive   proBNP    Collection Time: 10/06/20 12:52 AM    Specimen: Blood   Result Value Ref Range    proBNP 2,518.0 (H) 0.0-1,800.0 pg/mL   Magnesium    Collection Time: 10/06/20 12:52 AM    Specimen: Blood   Result Value Ref Range    Magnesium 2.0 1.6 - 2.4 mg/dL   Urinalysis With  Microscopic If Indicated (No Culture) - Urine, Clean Catch    Collection Time: 10/06/20 12:56 AM    Specimen: Urine, Clean Catch   Result Value Ref Range    Color, UA Yellow Yellow, Straw    Appearance, UA Cloudy (A) Clear    pH, UA <=5.0 5.0 - 8.0    Specific Gravity, UA 1.009 1.001 - 1.030    Glucose, UA Negative Negative    Ketones, UA Negative Negative    Bilirubin, UA Negative Negative    Blood, UA Small (1+) (A) Negative    Protein, UA Trace (A) Negative    Leuk Esterase, UA Large (3+) (A) Negative    Nitrite, UA Negative Negative    Urobilinogen, UA 0.2 E.U./dL 0.2 - 1.0 E.U./dL   Urinalysis, Microscopic Only - Urine, Clean Catch    Collection Time: 10/06/20 12:56 AM    Specimen: Urine, Clean Catch   Result Value Ref Range    RBC, UA 3-6 (A) None Seen, 0-2 /HPF    WBC, UA Too Numerous to Count (A) None Seen, 0-2 /HPF    Bacteria, UA 3+ (A) None Seen, Trace /HPF    Squamous Epithelial Cells, UA 3-6 (A) None Seen, 0-2 /HPF    Hyaline Casts, UA 0-6 0 - 6 /LPF    Methodology Automated Microscopy      Note: In addition to lab results from this visit, the labs listed above may include labs taken at another facility or during a different encounter within the last 24 hours. Please correlate lab times with ED admission and discharge times for further clarification of the services performed during this visit.    XR Chest 1 View   Final Result   Cardiomegaly with a small right effusion and right base atelectasis.      Signer Name: Danielito Plascencia MD    Signed: 10/6/2020 2:11 AM    Workstation Name: [email or web address]     [facility]      CT Abdomen Pelvis Without Contrast   Final Result   1. Thick-walled urinary bladder concerning for cystitis.   2. Small amount of ascites with generalized anasarca.   3. Mild ileus pattern in the small bowel with no definite obstruction. The appendix is normal.   4. No renal or ureteral stones. No hydronephrosis.   5. Small right pleural effusion.   6. Cholecystectomy.                Signer Name: Danielito Plascencia MD    Signed: 10/6/2020 2:06 AM    Workstation Name: [id number]     [facility]        Vitals:    10/06/20 0100 10/06/20 0130 10/06/20 0200 10/06/20 0230   BP: 162/88 151/71 155/72 173/95   Pulse:       Resp:       Temp:       SpO2: 100% 99% 98% 100%   Weight:       Height:         Medications   sodium chloride 0.9 % flush 10 mL (has no administration in time range)   sodium chloride 0.9 % flush 10 mL (has no administration in time range)   cefTRIAXone (ROCEPHIN) 1 g/100 mL 0.9% NS (MBP) (0 g Intravenous Stopped 10/6/20 0347)   furosemide (LASIX) injection 40 mg (40 mg Intravenous Given 10/6/20 0347)     ECG/EMG Results (last 24 hours)     ** No results found for the last 24 hours. **        ECG 12 Lead                                          MDM    Final diagnoses:   Acute UTI   Nausea   Abdominal distention   Other ascites   Paroxysmal atrial flutter (CMS/HCC)   Chronic anticoagulation   History of diabetes mellitus   History of cholecystectomy   Acute renal insufficiency   Hyponatremia            Lauren Tavares PA-C  10/06/20 9927

## 2020-10-06 NOTE — PLAN OF CARE
Problem: Adult Inpatient Plan of Care  Goal: Plan of Care Review  Outcome: Ongoing, Progressing  Flowsheets (Taken 10/6/2020 0615)  Plan of Care Reviewed With: patient  Goal: Patient-Specific Goal (Individualized)  Outcome: Ongoing, Progressing  Goal: Absence of Hospital-Acquired Illness or Injury  Outcome: Ongoing, Progressing  Intervention: Identify and Manage Fall Risk  Recent Flowsheet Documentation  Taken 10/6/2020 0600 by Jacki Junior RN  Safety Promotion/Fall Prevention: safety round/check completed  Intervention: Prevent Skin Injury  Recent Flowsheet Documentation  Taken 10/6/2020 0600 by Jacki Junior RN  Body Position: supine  Taken 10/6/2020 0500 by Jacki Junior RN  Body Position: sitting up in bed  Goal: Optimal Comfort and Wellbeing  Outcome: Ongoing, Progressing  Intervention: Provide Person-Centered Care  Recent Flowsheet Documentation  Taken 10/6/2020 0500 by Jacki Junior RN  Trust Relationship/Rapport:   care explained   thoughts/feelings acknowledged  Goal: Readiness for Transition of Care  Outcome: Ongoing, Progressing  Intervention: Mutually Develop Transition Plan  Recent Flowsheet Documentation  Taken 10/6/2020 0448 by Jacki Junior RN  Transportation Anticipated: family or friend will provide  Patient/Family Anticipated Services at Transition: none  Patient/Family Anticipates Transition to: home with family  Taken 10/6/2020 0444 by Jacki Junior RN  Equipment Currently Used at Home:   walker, rolling   shower chair   Goal Outcome Evaluation:  Plan of Care Reviewed With: patient

## 2020-10-06 NOTE — PLAN OF CARE
Problem: Adult Inpatient Plan of Care  Goal: Plan of Care Review  Recent Flowsheet Documentation  Taken 10/6/2020 1053 by Celena Rubio, PT  Plan of Care Reviewed With: patient  Outcome Summary: PT evaluation completed.  Pt completed sit<-->stand with CGA and ambulated 120 feet using rw with CGAx1 - no unsteadiness noted, but pt fatigued by end of gait reporting her upper and lower extremities were feeling weak.  Skilled PT services warranted to improve mobility and safety.  Recommend home with assist.

## 2020-10-06 NOTE — H&P
"    [facility] Medicine Services  HISTORY AND PHYSICAL    Patient Name: Ivory Carr  : 1945  MRN: [id number]  Primary Care Physician: Christiane Peter APRN  Date of admission: 10/6/2020      Subjective   Subjective     Chief Complaint:  Abdominal distension    HPI:  Ivory Carr is a 75 y.o. female with a past medical history significant for poorly controlled DM2, CKD, atrial flutter anticoagulated on xarelto, hypertension, and HLD. She presents today with complaints of generalized weakness, progressively worsening abdominal distension/deneralized edema, and dysuria. Patient is reporting Cholecystectomy in 2020 at [facility]. States she hasn't  after the procedure she had a \"staph\" infection at incision site which improved with ABX. Since then however, swelling and distension in abdomen has worsened. She is reporting at least a 10 pound weight gain related to fluid. She takes 20 mg lasix BID and reports compliance with medications. Denies history of heart failure, but notes complicated history with atrial flutter. She is s/p cardioversion X2 and followed by Dr. Mccray per cardiology. She is scheduled to undergo ablation 10/15/20 with Dr. Anderson. Currently there are no complaints of fever, cough, congestion, or chest pain. No abdominal pain or N/v/D. No known exposure to COVID 19    Emergency Department Evaluation; vitals stable. Glucose 217. Sodium 120. creatinine 1.18. BUN 29. H/H 10.1 and 31.2 respectively. UA positive for acute infection. CXR demonstrates cardiomegaly and small right pleural effusion. CT A/P notes ascites with diffuse anasarca    Review of Systems   Constitutional: Positive for chills. Negative for fever.   HENT: Negative for congestion and trouble swallowing.    Eyes: Negative for photophobia and visual disturbance.   Respiratory: Negative for cough and shortness of breath.    Cardiovascular: Negative for chest pain and leg swelling. "   Gastrointestinal: Positive for abdominal distention and constipation. Negative for diarrhea, nausea and vomiting.   Endocrine: Negative for cold intolerance and heat intolerance.   Genitourinary: Positive for dysuria. Negative for flank pain.   Musculoskeletal: Negative for back pain and gait problem.   Skin: Negative for pallor and rash.   Allergic/Immunologic: Negative for immunocompromised state.   Neurological: Positive for weakness. Negative for dizziness and headaches.   Hematological: Negative for adenopathy.   Psychiatric/Behavioral: Negative for agitation and confusion. Sleep disturbance:           All other systems reviewed and are negative.     Personal History     Past Medical History:   Diagnosis Date   • ASD (atrial septal defect)    • Atrial flutter (CMS/HCC) 9/24/2020    Added automatically from request for surgery [id number]   • CKD (chronic kidney disease) 10/6/2020   • Diabetes (CMS/HCC)    • Hypertension        Past Surgical History:   Procedure Laterality Date   • CHOLECYSTECTOMY         Family History: family history includes Hyperlipidemia in her mother; Hypertension in her mother. Otherwise pertinent FHx was reviewed and unremarkable.     Social History:  reports that she has never smoked. She has never used smokeless tobacco. She reports that she does not drink alcohol or use drugs.  Social History     Social History Narrative   • Not on file       Medications:  Available home medication information reviewed.  Medications Prior to Admission   Medication Sig Dispense Refill Last Dose   • aspirin 81 MG EC tablet Take 81 mg by mouth Daily.      • furosemide (LASIX) 20 MG tablet Take 20 mg by mouth 2 (Two) Times a Day.      • insulin glargine (LANTUS) 100 UNIT/ML injection Inject  under the skin into the appropriate area as directed Daily.      • lisinopril (PRINIVIL,ZESTRIL) 10 MG tablet Take 10 mg by mouth Daily.      • rivaroxaban (XARELTO) 20 MG tablet Take 20 mg by mouth Daily.           Allergies   Allergen Reactions   • Statins Myalgia       Objective   Objective     Vital Signs:   Temp:  [97.3 °F (36.3 °C)] 97.3 °F (36.3 °C)  Heart Rate:  [64] 64  Resp:  [18] 18  BP: (137-173)/() 173/95        Physical Exam   Constitutional: Awake, alert  Eyes: PERRLA, sclerae anicteric, no conjunctival injection  HENT: NCAT, mucous membranes moist  Neck: Supple, no thyromegaly, no lymphadenopathy, trachea midline  Respiratory: Clear to auscultation bilaterally, nonlabored respirations   Cardiovascular: RRR, no murmurs, rubs, or gallops, palpable pedal pulses bilaterally  Gastrointestinal: Positive bowel sounds, soft, there is distension, non tender  Musculoskeletal: No bilateral ankle edema, no clubbing or cyanosis to extremities   Pitting lower extremity edema  Psychiatric: Appropriate affect, cooperative  Neurologic: Oriented x 3, strength symmetric in all extremities, Cranial Nerves grossly intact to confrontation, speech clear  Skin: No rashes      Results Reviewed:  I have personally reviewed current lab and radiology data.    Results from last 7 days   Lab Units 10/06/20  0052   WBC 10*3/mm3 7.77   HEMOGLOBIN g/dL 10.1*   HEMATOCRIT % 31.2*   PLATELETS 10*3/mm3 342     Results from last 7 days   Lab Units 10/06/20  0052   SODIUM mmol/L 120*   POTASSIUM mmol/L 4.6   CHLORIDE mmol/L 83*   CO2 mmol/L 27.0   BUN mg/dL 29*   CREATININE mg/dL 1.18*   GLUCOSE mg/dL 217*   CALCIUM mg/dL 8.8   ALT (SGPT) U/L 14   AST (SGOT) U/L 23   PROBNP pg/mL 2,518.0*     Estimated Creatinine Clearance: 46.6 mL/min (A) (by C-G formula based on SCr of 1.18 mg/dL (H)).  Brief Urine Lab Results  (Last result in the past 365 days)      Color   Clarity   Blood   Leuk Est   Nitrite   Protein   CREAT   Urine HCG        10/06/20 0056 Yellow Cloudy Small (1+) Large (3+) Negative Trace             Imaging Results (Last 24 Hours)     Procedure Component Value Units Date/Time    XR Chest 1 View [[id number]] Collected: 10/06/20  0211     Updated: 10/06/20 0213    Narrative:      CR Chest 1 Vw    INDICATION:   Shortness of air. Edema.     COMPARISON:    None available.    FINDINGS:  Single portable AP view(s) of the chest.  Heart is enlarged status post CABG. There is a small right pleural effusion with atelectasis at the right lung base. Lungs otherwise are clear. No pneumothorax is seen.      Impression:      Cardiomegaly with a small right effusion and right base atelectasis.    Signer Name: Danielito Plascencia MD   Signed: 10/6/2020 2:11 AM   Workstation Name: [email or web address]    [facility]    CT Abdomen Pelvis Without Contrast [[id number]] Collected: 10/06/20 0206     Updated: 10/06/20 0209    Narrative:      CT Abdomen Pelvis WO    INDICATION:   Abdominal pain with distention. Nausea.    TECHNIQUE:   CT of the abdomen and pelvis without IV contrast. Coronal and sagittal reconstructions were obtained.  Radiation dose reduction techniques included automated exposure control or exposure modulation based on body size. Count of known CT and cardiac nuc  med studies performed in previous 12 months: 0.     COMPARISON:   None available.    FINDINGS:  There is a small right pleural effusion. There is atelectasis in the right lung base. There is atherosclerotic disease but no aortic aneurysm. No renal or ureteral stones are seen. There is no hydronephrosis. Unenhanced solid abdominal organs are grossly  normal. Gallbladder is surgically absent. There is a small amount of ascites in the abdomen and pelvis. Urinary bladder has a thickened wall concerning for cystitis. Solid pelvic organs are grossly normal. The appendix is normal. The colon is normal as  well. There is some gas in nondistended small bowel loops which may reflect a mild ileus. There is diffuse degenerative disease in the lumbar spine. There is mild anasarca.      Impression:      1. Thick-walled urinary bladder concerning for cystitis.  2. Small amount of ascites with  generalized anasarca.  3. Mild ileus pattern in the small bowel with no definite obstruction. The appendix is normal.  4. No renal or ureteral stones. No hydronephrosis.  5. Small right pleural effusion.  6. Cholecystectomy.          Signer Name: Danielito Plascencia MD   Signed: 10/6/2020 2:06 AM   Workstation Name: [email or web address]    [facility] [place]             Assessment/Plan   Assessment & Plan     Active Hospital Problems    Diagnosis POA   • **Hyponatremia [E87.1] Yes     Priority: High   • Anasarca and ascites [R60.1] Yes     Priority: High   • Elevated serum creatinine [R79.89] Yes     Priority: High   • Acute UTI (urinary tract infection) [N39.0] Yes     Priority: High   • DM2 (diabetes mellitus, type 2) (CMS/HCC) [E11.9] Yes     Priority: Medium   • Atrial flutter (CMS/HCC) [I48.92] Yes     Priority: Medium     Added automatically from request for surgery [id number]     • Anemia [D64.9] Yes     Priority: Low   • Acute UTI [N39.0] Yes       1. Hyponatremia:  - likely hypervolemic hyponatremia due to volume overload  - hold trazadone  - trend chemistry q 4 hours x 6  - 1500 ml fluid restriction  - check urine sodium, urine osmolality, serum osmolality   - am cortisol  - TSH  - seizure precautions    2. Anasarca/ Ascites:  - liver enzymes favorable  - check coag panel  - acute hepatitis panel  - GI consult    3. Elevated Serum creatinine  - apparently history of CKD. Baseline reportedly ~ 1.3  - avoid additional nephrotoxins    4. Acute UTI:  - started on rocephin  - check cultures    5. Atrial Flutter:  - stable and rate controlled  - anticoagulated on xarelto. Continue    6. DM2:  - poorly controlled. Last A1c > 14  - start SSI with scheduled accu checks        DVT prophylaxis: Xarelto      CODE STATUS:  Full code  Code Status and Medical Interventions:   Ordered at: 10/06/20 0410     Level Of Support Discussed With:    Patient     Code Status:    CPR     Medical Interventions (Level of Support Prior  "to Arrest):    Full       Electronically signed by Aly Uribe PA-C, 10/06/20, 4:18 AM EDT.      Attending   Admission Attestation       I have seen and examined the patient, performing an independent face-to-face diagnostic evaluation with plan of care reviewed and developed with the advanced practice clinician (APC).      Brief Summary Statement:   Ivory Carr is a 75 y.o. female with PMHx of Atrial flutter, T2DM, HTN, anemia and CKD. The pt presented to [facility] ED with worsening abdomen distension and lower ext edema. Pt under went cholecystomy in August 2020 at [facility]. And she developed a post-op \"staph\" infection. Since that time she reports increased abd pain and swelling. And lower ext edema. She reports a 10 lbs weight gain and dyspnea with exertion and orthopnea. She has atrial flutter as well and is schedule for an ablation with EP. Pt was noted in the ED to have a sodium of 120, and UTI. And ascites on CT of abd and pelvis. Pt will be admitted and hyponatremia will be address and with well consult GI regarding ascites/anasarca.     Remainder of detailed HPI is as noted by APC and has been reviewed and/or edited by me for completeness.    Attending Physical Exam:  Constitutional: No acute distress, awake, alert  HENT: NCAT, mucous membranes moist  Respiratory: rales in lung bases, respiratory effort normal   Cardiovascular: irregular, no murmurs, rubs, or gallops, bilateral pitting edema   Gastrointestinal: Positive bowel sounds, soft, nontender, markedly distended   Musculoskeletal: 2 plus edema  Psychiatric: Appropriate affect, cooperative  Neurologic: Oriented x 3, strength symmetric in all extremities, Cranial Nerves grossly intact to confrontation, speech clear  Skin: No rashes      Brief Assessment/Plan :  See detailed assessment and plan developed with APC which I have reviewed and/or edited for completeness.      Admission Status: I believe that this patient meets INPATIENT status due to " hyponatremia, abd pain and distension.  I feel patient’s risk for adverse outcomes and need for care warrant INPATIENT evaluation and I predict the patient’s care encounter to likely last beyond 2 midnights.        Monique Narayan,   10/06/20

## 2020-10-06 NOTE — PROGRESS NOTES
Clinical Nutrition   Reason For Visit: Identified at risk by screening criteria, MST score 2+, Reduced oral intake    Patient Name: Ivory Carr  YOB: 1945  MRN: [id number]  Date of Encounter: 10/06/20 13:01 EDT  Admission date: 10/6/2020      Nutrition Assessment     Admission Problem List:  Weakness  Abdominal distension  Anasarca/Ascites - ?etiology  Hyponatremia  UTI      Applicable PMH:  HTN, HLD  T2DM  CKD  PAF  S/p CCY (8/2020)      Applicable medical tests/procedures since admission:      Reported/Observed/Food/Nutrition Related History   Patient sitting up in bedside chair and eating lunch.  present. Patient reports she had a poor appetite and PO intake (<75% of usual) from the time of her CCY (8/2020) until recently. Her appetite and PO intake just recently began improving. Suspects she has had resulting unintentional weight loss, but unsure of amount d/t current fluid retention. Reports UBW ~170 lbs. Denies food allergies and difficulty chewing/swallowing. Declines ONS at this time. Patient states she weighed 196 lbs on standing scale yesterday (10/5).    Anthropometrics   Height: 68 in  Weight: 189 lbs (bed scale weight 10/6 per ns doc)  BMI: 28.8  BMI classification: Overweight: 25.0-29.9kg/m2    IBW: 140 lbs    UBW: ~170 lbs (prior to CCY per pt)  Weight change: RD unable to determine if pt has had weight loss 2/2 weight gain from fluid retention - will continue to monitor wt.    Labs reviewed   Labs reviewed: Yes    Medications reviewed   Medications reviewed: Yes  Pertinent: antibiotic, lasix, insulin    Current Nutrition Prescription   PO: Diet Regular; Consistent Carbohydrate    Evaluation of Received Nutrient/Fluid Intake: insufficient data    Nutrition Diagnosis     Problem Inadequate oral intake   Etiology Decreased appetite   Signs/Symptoms Pt reports decreased appetite/PO intake (<75% of usual) since 8/2020     Intervention   Intervention: Follow treatment progress,  Care plan reviewed, Interview for preferences, Encourage intake, Supplement offered/refused    Goal:   General: Nutrition support treatment  PO: Increase intake    Monitoring/Evaluation:     Monitoring/Evaluation: Per protocol, I&O, PO intake, Pertinent labs, Weight, GI status, Symptoms     Will Continue to follow per protocol    Archana Lucas RD  Time Spent: 45 min

## 2020-10-07 ENCOUNTER — APPOINTMENT (OUTPATIENT)
Dept: CT IMAGING | Facility: HOSPITAL | Age: 75
End: 2020-10-07

## 2020-10-07 PROBLEM — I07.1 SEVERE TRICUSPID REGURGITATION: Status: ACTIVE | Noted: 2020-10-07

## 2020-10-07 LAB
ALBUMIN FLD-MCNC: 2.1 G/DL
APPEARANCE FLD: ABNORMAL
COLOR FLD: YELLOW
GLUCOSE BLDC GLUCOMTR-MCNC: 161 MG/DL (ref 70–130)
GLUCOSE BLDC GLUCOMTR-MCNC: 255 MG/DL (ref 70–130)
GLUCOSE BLDC GLUCOMTR-MCNC: 270 MG/DL (ref 70–130)
GLUCOSE BLDC GLUCOMTR-MCNC: 286 MG/DL (ref 70–130)
LYMPHOCYTES NFR FLD MANUAL: 58 %
MACROPHAGE FLUID: 24 %
MESOTHL CELL NFR FLD MANUAL: 1 %
MONOCYTES NFR FLD: 8 %
NEUTROPHILS NFR FLD MANUAL: 9 %
PROT FLD-MCNC: 3.1 G/DL
RBC # FLD AUTO: 5000 /MM3
WBC # FLD AUTO: 684 /MM3

## 2020-10-07 PROCEDURE — 87070 CULTURE OTHR SPECIMN AEROBIC: CPT | Performed by: INTERNAL MEDICINE

## 2020-10-07 PROCEDURE — 25010000002 CEFTRIAXONE PER 250 MG: Performed by: PHYSICIAN ASSISTANT

## 2020-10-07 PROCEDURE — 99232 SBSQ HOSP IP/OBS MODERATE 35: CPT | Performed by: INTERNAL MEDICINE

## 2020-10-07 PROCEDURE — 87015 SPECIMEN INFECT AGNT CONCNTJ: CPT | Performed by: INTERNAL MEDICINE

## 2020-10-07 PROCEDURE — 75989 ABSCESS DRAINAGE UNDER X-RAY: CPT

## 2020-10-07 PROCEDURE — 63710000001 INSULIN LISPRO (HUMAN) PER 5 UNITS: Performed by: INTERNAL MEDICINE

## 2020-10-07 PROCEDURE — 25010000002 CEFTRIAXONE PER 250 MG: Performed by: INTERNAL MEDICINE

## 2020-10-07 PROCEDURE — 63710000001 INSULIN DETEMIR PER 5 UNITS: Performed by: INTERNAL MEDICINE

## 2020-10-07 PROCEDURE — 82962 GLUCOSE BLOOD TEST: CPT

## 2020-10-07 PROCEDURE — 0 LIDOCAINE 1 % SOLUTION: Performed by: RADIOLOGY

## 2020-10-07 PROCEDURE — 82042 OTHER SOURCE ALBUMIN QUAN EA: CPT | Performed by: INTERNAL MEDICINE

## 2020-10-07 PROCEDURE — 25010000003 LIDOCAINE 1 % SOLUTION: Performed by: RADIOLOGY

## 2020-10-07 PROCEDURE — 63710000001 INSULIN LISPRO (HUMAN) PER 5 UNITS

## 2020-10-07 PROCEDURE — 88112 CYTOPATH CELL ENHANCE TECH: CPT | Performed by: INTERNAL MEDICINE

## 2020-10-07 PROCEDURE — 87205 SMEAR GRAM STAIN: CPT | Performed by: INTERNAL MEDICINE

## 2020-10-07 PROCEDURE — 99254 IP/OBS CNSLTJ NEW/EST MOD 60: CPT | Performed by: PHYSICIAN ASSISTANT

## 2020-10-07 PROCEDURE — 88305 TISSUE EXAM BY PATHOLOGIST: CPT | Performed by: INTERNAL MEDICINE

## 2020-10-07 PROCEDURE — 84157 ASSAY OF PROTEIN OTHER: CPT | Performed by: INTERNAL MEDICINE

## 2020-10-07 PROCEDURE — 89051 BODY FLUID CELL COUNT: CPT | Performed by: INTERNAL MEDICINE

## 2020-10-07 PROCEDURE — 0W9G3ZZ DRAINAGE OF PERITONEAL CAVITY, PERCUTANEOUS APPROACH: ICD-10-PCS | Performed by: RADIOLOGY

## 2020-10-07 RX ORDER — LIDOCAINE HYDROCHLORIDE 10 MG/ML
20 INJECTION, SOLUTION INFILTRATION; PERINEURAL ONCE
Status: COMPLETED | OUTPATIENT
Start: 2020-10-07 | End: 2020-10-07

## 2020-10-07 RX ADMIN — INSULIN LISPRO 5 UNITS: 100 INJECTION, SOLUTION INTRAVENOUS; SUBCUTANEOUS at 17:57

## 2020-10-07 RX ADMIN — ASPIRIN 81 MG: 81 TABLET, COATED ORAL at 08:42

## 2020-10-07 RX ADMIN — INSULIN DETEMIR 15 UNITS: 100 INJECTION, SOLUTION SUBCUTANEOUS at 20:25

## 2020-10-07 RX ADMIN — INSULIN LISPRO 5 UNITS: 100 INJECTION, SOLUTION INTRAVENOUS; SUBCUTANEOUS at 17:58

## 2020-10-07 RX ADMIN — LIDOCAINE HYDROCHLORIDE 20 ML: 10 INJECTION, SOLUTION INFILTRATION; PERINEURAL at 14:44

## 2020-10-07 RX ADMIN — FUROSEMIDE 20 MG: 20 TABLET ORAL at 08:41

## 2020-10-07 RX ADMIN — INSULIN LISPRO 8 UNITS: 100 INJECTION, SOLUTION INTRAVENOUS; SUBCUTANEOUS at 12:15

## 2020-10-07 RX ADMIN — RIVAROXABAN 15 MG: 15 TABLET, FILM COATED ORAL at 17:57

## 2020-10-07 RX ADMIN — INSULIN LISPRO 3 UNITS: 100 INJECTION, SOLUTION INTRAVENOUS; SUBCUTANEOUS at 08:42

## 2020-10-07 RX ADMIN — SODIUM CHLORIDE, PRESERVATIVE FREE 10 ML: 5 INJECTION INTRAVENOUS at 20:24

## 2020-10-07 RX ADMIN — CEFTRIAXONE SODIUM 1 G: 1 INJECTION, POWDER, FOR SOLUTION INTRAMUSCULAR; INTRAVENOUS at 05:31

## 2020-10-07 RX ADMIN — FUROSEMIDE 20 MG: 20 TABLET ORAL at 17:57

## 2020-10-07 RX ADMIN — INSULIN DETEMIR 15 UNITS: 100 INJECTION, SOLUTION SUBCUTANEOUS at 08:42

## 2020-10-07 NOTE — PROGRESS NOTES
Continued Stay Note  [facility]     Patient Name: Ivory Carr  MRN: [id number]  Today's Date: 10/7/2020    Admit Date: 10/6/2020    Discharge Plan     Row Name 10/07/20 1124       Plan    Plan Comments  Plan is forCT guided paracentesis today. CM will continue to follow for any discharge needs. Pt continues to refuse to be screened for medicaid so any discharge needs will be selfpay.        Discharge Codes    No documentation.       Expected Discharge Date and Time     Expected Discharge Date Expected Discharge Time    Oct 8, 2020             Shona Velazco RN

## 2020-10-07 NOTE — PROGRESS NOTES
[facility] Medicine Services  PROGRESS NOTE    Patient Name: Ivory Carr  : 1945  MRN: [id number]    Date of Admission: 10/6/2020  Primary Care Physician: Christiane Peter APRN    Subjective   Subjective     CC:f/u abdominal distension    HPI:No acute events overnight, patient continues to have abdominal discomfort and dyspnea.    Review of Systems  Gen- No fevers, chills  CV- No chest pain, palpitations  Resp- No cough, +dyspnea  GI- No N/V/D, +abd pain    All other systems reviewed and are negative    Objective   Objective     Vital Signs:   Temp:  [97.5 °F (36.4 °C)-97.8 °F (36.6 °C)] 97.7 °F (36.5 °C)  Heart Rate:  [62-89] 89  Resp:  [16-18] 18  BP: (112-167)/() 137/64        Physical Exam:  Constitutional: No acute distress, awake, alert  HENT: NCAT, mucous membranes moist  Respiratory: Clear to auscultation bilaterally, respiratory effort normal   Cardiovascular: RRR, no murmurs, rubs, or gallops, palpable pedal pulses bilaterally  Gastrointestinal: Positive bowel sounds, soft, mod ttp, distended  Musculoskeletal:  bilateral ankle edema  Psychiatric: Appropriate affect, cooperative  Neurologic: Oriented x 3, non-focal  Skin: No rashes    Results Reviewed:  Results from last 7 days   Lab Units 10/06/20  0052   WBC 10*3/mm3 7.77   HEMOGLOBIN g/dL 10.1*   HEMATOCRIT % 31.2*   PLATELETS 10*3/mm3 342   INR  2.20*     Results from last 7 days   Lab Units 10/06/20  0052   SODIUM mmol/L 120*   POTASSIUM mmol/L 4.6   CHLORIDE mmol/L 83*   CO2 mmol/L 27.0   BUN mg/dL 29*   CREATININE mg/dL 1.18*   GLUCOSE mg/dL 217*   CALCIUM mg/dL 8.8   ALT (SGPT) U/L 14   AST (SGOT) U/L 23   PROBNP pg/mL 2,518.0*     Estimated Creatinine Clearance: 46.7 mL/min (A) (by C-G formula based on SCr of 1.18 mg/dL (H)).    Microbiology Results Abnormal     Procedure Component Value - Date/Time    COVID PRE-OP / PRE-PROCEDURE SCREENING ORDER (NO ISOLATION) - Swab, Nasopharynx [[id number]]  (Normal)  "Collected: 10/06/20 0420    Lab Status: Final result Specimen: Swab from Nasopharynx Updated: 10/06/20 0606    Narrative:      The following orders were created for panel order COVID PRE-OP / PRE-PROCEDURE SCREENING ORDER (NO ISOLATION) - Swab, Nasopharynx.  Procedure                               Abnormality         Status                     ---------                               -----------         ------                     COVID-19,CEPHEID,FANTA IN-...[[id number]]  Normal              Final result                 Please view results for these tests on the individual orders.    COVID-19,CEPHEID,FANTA IN-HOUSE(OR EMERGENT/ADD-ON),NP SWAB IN TRANSPORT MEDIA 3-4 HR TAT - Swab, Nasopharynx [[id number]]  (Normal) Collected: 10/06/20 0420    Lab Status: Final result Specimen: Swab from Nasopharynx Updated: 10/06/20 0606     COVID19 Not Detected    Narrative:      Fact sheet for providers: https://www.fda.gov/media/004581/download     Fact sheet for patients: https://www.fda.gov/media/573708/download          Imaging Results (Last 24 Hours)     ** No results found for the last 24 hours. **          Results for orders placed during the hospital encounter of 10/06/20   Transthoracic Echo Complete With Contrast if Necessary Per Protocol    Addendum · The right heart is enlarged. Left heart chamber sizes are normal. · There is severe tricuspid regurgitation. Septal motion (ventricular)  suggests an RV volume overload. · The atrial septum is abnormal. A septal \"patch\" is suggested. There is  no evidence of shunting by agitated saline study. · The estimated PA pressure is normal to slightly elevated. The estimated  RA pressure is elevated. · There is moderate concentric left ventricular hypertrophy. · Global and segmental LV wall motion is normal with an estimated LV  ejection fraction of 56%-60%. Estimated RV systolic function is normal. · There are fibrocalcific changes in the aortic valve. The valve is  functionally normal. · " "Calcium is noted in the mitral annulus and leaflets. Mild mitral  regurgitation is present. · There are no other important findings on this study. · Left ventricular ejection fraction appears to be 56 - 60%. · Left ventricular wall thickness is consistent with concentric  hypertrophy.        Irvin Mccray MD 10/6/2020  6:07 PM          Narrative · The right heart is enlarged. Left heart chamber sizes are normal.  · There is severe tricuspid regurgitation. Septal motion (ventricular)   suggests an RV volume overload.  · The atrial septum is abnormal. A septal \"patch\" is suggested. There is   no evidence of shunting by agitated saline study.  · The estimated PA pressure is normal to slightly elevated. The estimated   RA pressure is elevated.  · There is moderate concentric left ventricular hypertrophy.  · Global and segmental LV wall motion is normal with an estimated LV   ejection fraction of 56%-60%.  · There are fibrocalcific changes in the aortic valve. The valve is   functionally normal.  · Calcium is noted in the mitral annulus and leaflets. Mild mitral   regurgitation is present.  · There are no other important findings on this study.          I have reviewed the medications:  Scheduled Meds:aspirin, 81 mg, Oral, Daily  cefTRIAXone, 1 g, Intravenous, Q24H  furosemide, 20 mg, Oral, BID  insulin detemir, 15 Units, Subcutaneous, Q12H  insulin lispro, 0-14 Units, Subcutaneous, TID AC  rivaroxaban, 15 mg, Oral, Daily With Dinner  sodium chloride, 10 mL, Intravenous, Q12H      Continuous Infusions:   PRN Meds:.•  acetaminophen  •  dextrose  •  dextrose  •  glucagon (human recombinant)  •  melatonin  •  sodium chloride  •  [COMPLETED] Insert peripheral IV **AND** sodium chloride  •  sodium chloride    Assessment/Plan   Assessment & Plan     Active Hospital Problems    Diagnosis  POA   • **Hyponatremia [E87.1]  Yes   • DM2 (diabetes mellitus, type 2) (CMS/HCC) [E11.9]  Yes   • Anasarca and ascites [R60.1]  Yes   • " Elevated serum creatinine [R79.89]  Yes   • Acute UTI (urinary tract infection) [N39.0]  Yes   • Anemia [D64.9]  Yes   • Acute UTI [N39.0]  Yes   • CKD (chronic kidney disease) [N18.9]  Yes   • Atrial flutter (CMS/HCC) [I48.92]  Yes      Resolved Hospital Problems   No resolved problems to display.        Brief Hospital Course to date:  Ivory Carr is a 75 y.o. female with past medical history of type 2 diabetes, CKD stage III,  atrial flutter s/p cardioversion x2 with plans for ablation by Dr. Garcia on 10/15, recent CCY, she presents the ED with generalized weakness, and abdominal distension.     Plan   Abdominal distention  -CT abdomen pelvis does show ascites, diffuse anasarca, etiology of ascites is unknown.  - CT abdomen pelvis shows normal liver, INR is stable platelet count is normal, this will suggest ascites likely cardiac in etiology and not liver.  -GI following, plan for CT guided paracentesis today, f/u fluid studies  -Echo shows severe TR  with RV volume overload, EF 56 to 60%, will consult cardiology, sees Dr. Mccray     Atrial flutter  -stable, rate is currently controlled continue on coagulation with Xarelto  -Plan for an ablation 10/15 with Dr. Garcia     Poorly controlled type 2 diabetes, last A1c> 14%  -FSBG's were elevated, add basal levemir 15 units BID and continue ssi, adjust as warranted  -  Hyponatremia  -Suspect this is hypervolemic hyponatremia considering the above  -f/u urine studies, repeat BMP in a.m.     CKD stage 3-renal function seems to be at baseline    DVT Prophylaxis: xarelto    Disposition: TBD.    CODE STATUS:   Code Status and Medical Interventions:   Ordered at: 10/06/20 0410     Level Of Support Discussed With:    Patient     Code Status:    CPR     Medical Interventions (Level of Support Prior to Arrest):    Full       Marilee Chavez MD  10/07/20

## 2020-10-07 NOTE — PLAN OF CARE
VSS, Aflutter on Tele, Pt denies Pain this shift, also denies N/V. Pt has rested well this shift. No significant changes detected. Will continue to assess and monitor.       Problem: Adult Inpatient Plan of Care  Goal: Plan of Care Review  Outcome: Ongoing, Progressing  Flowsheets (Taken 10/7/2020 0142)  Progress: improving  Plan of Care Reviewed With: patient  Outcome Summary: See Note  Goal: Optimal Comfort and Wellbeing  Outcome: Ongoing, Progressing  Intervention: Provide Person-Centered Care  Recent Flowsheet Documentation  Taken 10/7/2020 0005 by Randell Maldonado RN  Trust Relationship/Rapport:   care explained   choices provided   questions answered   questions encouraged  Taken 10/6/2020 2045 by Randell Maldonado RN  Trust Relationship/Rapport:   care explained   choices provided   questions answered   questions encouraged  Goal: Readiness for Transition of Care  Outcome: Ongoing, Progressing     Problem: Adult Inpatient Plan of Care  Goal: Absence of Hospital-Acquired Illness or Injury  Outcome: Met  Intervention: Identify and Manage Fall Risk  Recent Flowsheet Documentation  Taken 10/7/2020 0005 by Randell Maldonado RN  Safety Promotion/Fall Prevention:   activity supervised   assistive device/personal items within reach   clutter free environment maintained   fall prevention program maintained   nonskid shoes/slippers when out of bed   room organization consistent   safety round/check completed  Taken 10/6/2020 2230 by Randell Maldonado RN  Safety Promotion/Fall Prevention:   activity supervised   assistive device/personal items within reach   clutter free environment maintained   fall prevention program maintained   nonskid shoes/slippers when out of bed   room organization consistent   safety round/check completed  Taken 10/6/2020 2045 by Randell Maldonado RN  Safety Promotion/Fall Prevention:   activity supervised   assistive device/personal items within reach   clutter free environment maintained   fall prevention  program maintained   lighting adjusted   nonskid shoes/slippers when out of bed   room organization consistent   safety round/check completed  Intervention: Prevent Skin Injury  Recent Flowsheet Documentation  Taken 10/7/2020 0005 by Randell Maldonado RN  Body Position:   position changed independently   side-lying, left  Taken 10/6/2020 2230 by Randell Maldonado RN  Body Position:   position changed independently   tilted, right  Taken 10/6/2020 2045 by Randell Maldonado RN  Body Position:   position changed independently   supine  Intervention: Prevent and Manage VTE (venous thromboembolism) Risk  Recent Flowsheet Documentation  Taken 10/6/2020 2045 by Randell Maldonado RN  VTE Prevention/Management: dorsiflexion/plantar flexion performed     Problem: Fall Injury Risk  Goal: Absence of Fall and Fall-Related Injury  Outcome: Met  Intervention: Identify and Manage Contributors to Fall Injury Risk  Recent Flowsheet Documentation  Taken 10/6/2020 2045 by Randell Maldonado RN  Medication Review/Management: medications reviewed  Intervention: Promote Injury-Free Environment  Recent Flowsheet Documentation  Taken 10/7/2020 0005 by Randell Maldonado RN  Safety Promotion/Fall Prevention:   activity supervised   assistive device/personal items within reach   clutter free environment maintained   fall prevention program maintained   nonskid shoes/slippers when out of bed   room organization consistent   safety round/check completed  Taken 10/6/2020 2230 by Randell Maldonado RN  Safety Promotion/Fall Prevention:   activity supervised   assistive device/personal items within reach   clutter free environment maintained   fall prevention program maintained   nonskid shoes/slippers when out of bed   room organization consistent   safety round/check completed  Taken 10/6/2020 2045 by Randell Maldonado RN  Safety Promotion/Fall Prevention:   activity supervised   assistive device/personal items within reach   clutter free environment maintained   fall prevention  program maintained   lighting adjusted   nonskid shoes/slippers when out of bed   room organization consistent   safety round/check completed   Goal Outcome Evaluation:  Plan of Care Reviewed With: patient  Progress: improving  Outcome Summary: See Note

## 2020-10-07 NOTE — POST-PROCEDURE NOTE
Interventional Radiology Operative Note    Date: 10/07/20     Time: 15:42 EDT     Pre-op Diagnosis: Past medical history of type 2 diabetes, CKD stage III,  atrial flutter s/p cardioversion x2 with plans for ablation by Dr. Garcia on 10/15, recent CCY, she presents the ED with generalized weakness, and abdominal distension.   CT abdomen/pelvis does show minimal/small volume ascites.  Post-op Diagnosis: Same    Procedure: CT guided paracentesis    Surgeon: TRIXIE Dang M.D.  Assistants: None    Sedation: None    Estimated Blood Loss (EBL): Trace     Urine Output (UOP): N/A (short procedure)    IVF: N/A (short procedure)    Findings: Minimal/small volume ascites    Specimens: 50 ml Serous fluid. Sent for requested laboratory analysis.     Complications: No immediate    Disposition: Recovery. Stable.

## 2020-10-07 NOTE — NURSING NOTE
Pt placed on cardiac monitor. Vitals stable. Images taken and reviewed by Dr. Dang. A total of 50mL of yellow/orange tinged fluid was drained from peritoneum. Specimens sent to lab. Report called to 5G.

## 2020-10-08 PROBLEM — Z87.74 HISTORY OF REPAIR OF CONGENITAL ATRIAL SEPTAL DEFECT (ASD): Status: ACTIVE | Noted: 2020-10-05

## 2020-10-08 LAB
ANION GAP SERPL CALCULATED.3IONS-SCNC: 9 MMOL/L (ref 5–15)
BACTERIA SPEC AEROBE CULT: ABNORMAL
BUN SERPL-MCNC: 23 MG/DL (ref 8–23)
BUN/CREAT SERPL: 18.5 (ref 7–25)
CALCIUM SPEC-SCNC: 8.9 MG/DL (ref 8.6–10.5)
CHLORIDE SERPL-SCNC: 88 MMOL/L (ref 98–107)
CO2 SERPL-SCNC: 29 MMOL/L (ref 22–29)
CORTIS SERPL-MCNC: 19.57 MCG/DL
CREAT SERPL-MCNC: 1.24 MG/DL (ref 0.57–1)
DEPRECATED RDW RBC AUTO: 45.4 FL (ref 37–54)
ERYTHROCYTE [DISTWIDTH] IN BLOOD BY AUTOMATED COUNT: 15.1 % (ref 12.3–15.4)
FERRITIN SERPL-MCNC: 68.94 NG/ML (ref 13–150)
FOLATE SERPL-MCNC: 15.8 NG/ML (ref 4.78–24.2)
GFR SERPL CREATININE-BSD FRML MDRD: 42 ML/MIN/1.73
GLUCOSE BLDC GLUCOMTR-MCNC: 116 MG/DL (ref 70–130)
GLUCOSE BLDC GLUCOMTR-MCNC: 205 MG/DL (ref 70–130)
GLUCOSE BLDC GLUCOMTR-MCNC: 265 MG/DL (ref 70–130)
GLUCOSE BLDC GLUCOMTR-MCNC: 294 MG/DL (ref 70–130)
GLUCOSE SERPL-MCNC: 169 MG/DL (ref 65–99)
HCT VFR BLD AUTO: 30 % (ref 34–46.6)
HGB BLD-MCNC: 9.5 G/DL (ref 12–15.9)
IRON 24H UR-MRATE: 31 MCG/DL (ref 37–145)
IRON SATN MFR SERPL: 7 % (ref 20–50)
LAB AP CASE REPORT: NORMAL
MCH RBC QN AUTO: 26.1 PG (ref 26.6–33)
MCHC RBC AUTO-ENTMCNC: 31.7 G/DL (ref 31.5–35.7)
MCV RBC AUTO: 82.4 FL (ref 79–97)
OSMOLALITY SERPL: 275 MOSM/KG (ref 275–295)
PATH REPORT.FINAL DX SPEC: NORMAL
PLATELET # BLD AUTO: 269 10*3/MM3 (ref 140–450)
PMV BLD AUTO: 10.1 FL (ref 6–12)
POTASSIUM SERPL-SCNC: 4.9 MMOL/L (ref 3.5–5.2)
RBC # BLD AUTO: 3.64 10*6/MM3 (ref 3.77–5.28)
RETICS # AUTO: 0.07 10*6/MM3 (ref 0.02–0.13)
RETICS/RBC NFR AUTO: 1.84 % (ref 0.7–1.9)
SODIUM SERPL-SCNC: 126 MMOL/L (ref 136–145)
TIBC SERPL-MCNC: 416 MCG/DL (ref 298–536)
TRANSFERRIN SERPL-MCNC: 279 MG/DL (ref 200–360)
TSH SERPL DL<=0.05 MIU/L-ACNC: 2.11 UIU/ML (ref 0.27–4.2)
VIT B12 BLD-MCNC: 1837 PG/ML (ref 211–946)
WBC # BLD AUTO: 5.82 10*3/MM3 (ref 3.4–10.8)

## 2020-10-08 PROCEDURE — 99232 SBSQ HOSP IP/OBS MODERATE 35: CPT | Performed by: INTERNAL MEDICINE

## 2020-10-08 PROCEDURE — 63710000001 INSULIN LISPRO (HUMAN) PER 5 UNITS: Performed by: INTERNAL MEDICINE

## 2020-10-08 PROCEDURE — 97110 THERAPEUTIC EXERCISES: CPT

## 2020-10-08 PROCEDURE — 82728 ASSAY OF FERRITIN: CPT | Performed by: INTERNAL MEDICINE

## 2020-10-08 PROCEDURE — 84466 ASSAY OF TRANSFERRIN: CPT | Performed by: INTERNAL MEDICINE

## 2020-10-08 PROCEDURE — 25010000002 CEFTRIAXONE PER 250 MG: Performed by: PHYSICIAN ASSISTANT

## 2020-10-08 PROCEDURE — 99233 SBSQ HOSP IP/OBS HIGH 50: CPT | Performed by: INTERNAL MEDICINE

## 2020-10-08 PROCEDURE — 85027 COMPLETE CBC AUTOMATED: CPT | Performed by: INTERNAL MEDICINE

## 2020-10-08 PROCEDURE — 82962 GLUCOSE BLOOD TEST: CPT

## 2020-10-08 PROCEDURE — 63710000001 INSULIN LISPRO (HUMAN) PER 5 UNITS

## 2020-10-08 PROCEDURE — 80048 BASIC METABOLIC PNL TOTAL CA: CPT | Performed by: INTERNAL MEDICINE

## 2020-10-08 PROCEDURE — 85045 AUTOMATED RETICULOCYTE COUNT: CPT | Performed by: INTERNAL MEDICINE

## 2020-10-08 PROCEDURE — 25010000002 CEFTRIAXONE PER 250 MG: Performed by: INTERNAL MEDICINE

## 2020-10-08 PROCEDURE — 84443 ASSAY THYROID STIM HORMONE: CPT | Performed by: INTERNAL MEDICINE

## 2020-10-08 PROCEDURE — 82607 VITAMIN B-12: CPT | Performed by: INTERNAL MEDICINE

## 2020-10-08 PROCEDURE — 63710000001 INSULIN DETEMIR PER 5 UNITS: Performed by: INTERNAL MEDICINE

## 2020-10-08 PROCEDURE — 97116 GAIT TRAINING THERAPY: CPT

## 2020-10-08 PROCEDURE — 83930 ASSAY OF BLOOD OSMOLALITY: CPT | Performed by: INTERNAL MEDICINE

## 2020-10-08 PROCEDURE — 83540 ASSAY OF IRON: CPT | Performed by: INTERNAL MEDICINE

## 2020-10-08 PROCEDURE — 97530 THERAPEUTIC ACTIVITIES: CPT

## 2020-10-08 PROCEDURE — 82746 ASSAY OF FOLIC ACID SERUM: CPT | Performed by: INTERNAL MEDICINE

## 2020-10-08 PROCEDURE — 82533 TOTAL CORTISOL: CPT | Performed by: INTERNAL MEDICINE

## 2020-10-08 RX ADMIN — INSULIN DETEMIR 15 UNITS: 100 INJECTION, SOLUTION SUBCUTANEOUS at 08:37

## 2020-10-08 RX ADMIN — INSULIN LISPRO 5 UNITS: 100 INJECTION, SOLUTION INTRAVENOUS; SUBCUTANEOUS at 11:42

## 2020-10-08 RX ADMIN — INSULIN DETEMIR 15 UNITS: 100 INJECTION, SOLUTION SUBCUTANEOUS at 21:11

## 2020-10-08 RX ADMIN — SODIUM CHLORIDE, PRESERVATIVE FREE 10 ML: 5 INJECTION INTRAVENOUS at 08:37

## 2020-10-08 RX ADMIN — INSULIN LISPRO 5 UNITS: 100 INJECTION, SOLUTION INTRAVENOUS; SUBCUTANEOUS at 08:37

## 2020-10-08 RX ADMIN — INSULIN LISPRO 8 UNITS: 100 INJECTION, SOLUTION INTRAVENOUS; SUBCUTANEOUS at 17:08

## 2020-10-08 RX ADMIN — ASPIRIN 81 MG: 81 TABLET, COATED ORAL at 08:37

## 2020-10-08 RX ADMIN — RIVAROXABAN 15 MG: 15 TABLET, FILM COATED ORAL at 17:07

## 2020-10-08 RX ADMIN — FUROSEMIDE 20 MG: 20 TABLET ORAL at 08:37

## 2020-10-08 RX ADMIN — CEFTRIAXONE SODIUM 1 G: 1 INJECTION, POWDER, FOR SOLUTION INTRAMUSCULAR; INTRAVENOUS at 06:18

## 2020-10-08 RX ADMIN — FUROSEMIDE 20 MG: 20 TABLET ORAL at 17:06

## 2020-10-08 RX ADMIN — INSULIN LISPRO 5 UNITS: 100 INJECTION, SOLUTION INTRAVENOUS; SUBCUTANEOUS at 17:08

## 2020-10-08 NOTE — PLAN OF CARE
Problem: Adult Inpatient Plan of Care  Goal: Plan of Care Review  Recent Flowsheet Documentation  Taken 10/8/2020 1445 by Rachel Hernadez, OT  Plan of Care Reviewed With:   patient   spouse  Outcome Summary: Pt completed 10 reps BUE strengthening TE with yellow tband.  Pt declined need for AE to complete LBD, and donned/doffed socks given supervision.  Pt modified independent with bed mobility,  sueprvision to ambulate 200 ft with RW.  Pt with good progress toward OT goals, but limited by decreased activity tolerance.

## 2020-10-08 NOTE — PROGRESS NOTES
Continued Stay Note  [facility]     Patient Name: Ivory Carr  MRN: [id number]  Today's Date: 10/8/2020    Admit Date: 10/6/2020    Discharge Plan     Row Name 10/08/20 1318       Plan    Plan  Home with family    Patient/Family in Agreement with Plan  yes    Plan Comments  Spoke with patient at bedside.  Plans for heart cath tomorrow.  States that she plans to return home with family when medically ready.  Declines home services and DME at this time.  Will continue to follow for discharge planning.    Final Discharge Disposition Code  01 - home or self-care        Discharge Codes    No documentation.       Expected Discharge Date and Time     Expected Discharge Date Expected Discharge Time    Oct 9, 2020             Jenny Cortes RN

## 2020-10-08 NOTE — PLAN OF CARE
VSS, Aflutter on Tele, Pt rested well this shift. No c/o nausea, vomiting, or pain. No significant changes detected, will continue to assess and monitor.       Problem: Adult Inpatient Plan of Care  Goal: Plan of Care Review  Outcome: Met  Flowsheets  Taken 10/8/2020 0343  Plan of Care Reviewed With:   patient   spouse  Taken 10/7/2020 0142  Progress: improving  Outcome Summary: See Note  Goal: Optimal Comfort and Wellbeing  Outcome: Met  Goal: Readiness for Transition of Care  Outcome: Met     Problem: Adult Inpatient Plan of Care  Goal: Absence of Hospital-Acquired Illness or Injury  Intervention: Identify and Manage Fall Risk  Recent Flowsheet Documentation  Taken 10/8/2020 0250 by Randell Maldonado, RN  Safety Promotion/Fall Prevention:   activity supervised   assistive device/personal items within reach   clutter free environment maintained   fall prevention program maintained   nonskid shoes/slippers when out of bed   room organization consistent   safety round/check completed  Taken 10/8/2020 0045 by Randell Maldonado, RN  Safety Promotion/Fall Prevention:   activity supervised   assistive device/personal items within reach   clutter free environment maintained   fall prevention program maintained   nonskid shoes/slippers when out of bed   room organization consistent   safety round/check completed  Taken 10/7/2020 2205 by Randell Maldonado, JERED  Safety Promotion/Fall Prevention:   activity supervised   assistive device/personal items within reach   clutter free environment maintained   fall prevention program maintained   nonskid shoes/slippers when out of bed   room organization consistent   safety round/check completed  Taken 10/7/2020 2020 by Randell Maldonado, RN  Safety Promotion/Fall Prevention:   activity supervised   assistive device/personal items within reach   clutter free environment maintained   fall prevention program maintained   nonskid shoes/slippers when out of bed   room organization consistent   safety  round/check completed  Intervention: Prevent Skin Injury  Recent Flowsheet Documentation  Taken 10/8/2020 0250 by Randell Maldonado RN  Body Position: position changed independently  Taken 10/8/2020 0045 by Randell Maldonado RN  Body Position: position changed independently  Taken 10/7/2020 2205 by Randell Maldonado RN  Body Position: position changed independently  Taken 10/7/2020 2020 by Randell Maldonado RN  Body Position: position changed independently  Intervention: Prevent and Manage VTE (venous thromboembolism) Risk  Recent Flowsheet Documentation  Taken 10/7/2020 2020 by Randell Maldonado RN  VTE Prevention/Management: dorsiflexion/plantar flexion performed  Intervention: Prevent Infection  Recent Flowsheet Documentation  Taken 10/8/2020 0250 by Randell Maldonado RN  Infection Prevention: personal protective equipment utilized  Taken 10/8/2020 0045 by Randell Maldonado RN  Infection Prevention: personal protective equipment utilized  Taken 10/7/2020 2205 by Randell Maldonado RN  Infection Prevention:   personal protective equipment utilized   rest/sleep promoted  Taken 10/7/2020 2020 by Randell Maldonado RN  Infection Prevention:   personal protective equipment utilized   rest/sleep promoted     Problem: Fall Injury Risk  Goal: Absence of Fall and Fall-Related Injury  Intervention: Identify and Manage Contributors to Fall Injury Risk  Recent Flowsheet Documentation  Taken 10/8/2020 0250 by Randell Maldonado RN  Medication Review/Management: medications reviewed  Taken 10/8/2020 0045 by Randell Maldonado RN  Medication Review/Management: medications reviewed  Taken 10/7/2020 2020 by Randell Maldonado RN  Medication Review/Management: medications reviewed  Intervention: Promote Injury-Free Environment  Recent Flowsheet Documentation  Taken 10/8/2020 0250 by Randell Maldonado RN  Safety Promotion/Fall Prevention:   activity supervised   assistive device/personal items within reach   clutter free environment maintained   fall prevention program maintained    nonskid shoes/slippers when out of bed   room organization consistent   safety round/check completed  Taken 10/8/2020 0045 by Randell Maldonado RN  Safety Promotion/Fall Prevention:   activity supervised   assistive device/personal items within reach   clutter free environment maintained   fall prevention program maintained   nonskid shoes/slippers when out of bed   room organization consistent   safety round/check completed  Taken 10/7/2020 2205 by Randell Maldonado RN  Safety Promotion/Fall Prevention:   activity supervised   assistive device/personal items within reach   clutter free environment maintained   fall prevention program maintained   nonskid shoes/slippers when out of bed   room organization consistent   safety round/check completed  Taken 10/7/2020 2020 by Randell Maldonado, JERED  Safety Promotion/Fall Prevention:   activity supervised   assistive device/personal items within reach   clutter free environment maintained   fall prevention program maintained   nonskid shoes/slippers when out of bed   room organization consistent   safety round/check completed   Goal Outcome Evaluation:  Plan of Care Reviewed With: patient, spouse  Progress: improving  Outcome Summary: See Note

## 2020-10-08 NOTE — THERAPY TREATMENT NOTE
Patient Name: Ivory Carr  : 1945    MRN: [id number]                              Today's Date: 10/8/2020       Admit Date: 10/6/2020    Visit Dx:     ICD-10-CM ICD-9-CM   1. Acute UTI  N39.0 599.0   2. Nausea  R11.0 787.02   3. Abdominal distention  R14.0 787.3   4. Other ascites  R18.8 789.59   5. Paroxysmal atrial flutter (CMS/HCC)  I48.92 427.32   6. Chronic anticoagulation  Z79.01 V58.61   7. History of diabetes mellitus  Z86.39 V12.29   8. History of cholecystectomy  Z90.49 V45.79   9. Acute renal insufficiency  N28.9 593.9   10. Hyponatremia  E87.1 276.1     Patient Active Problem List   Diagnosis   • Atrial flutter (CMS/HCC)   • DM2 (diabetes mellitus, type 2) (CMS/HCC)   • Anasarca and ascites   • Elevated serum creatinine   • Hyponatremia   • Acute UTI (urinary tract infection)   • Anemia   • Acute UTI   • CKD (chronic kidney disease)   • Severe tricuspid regurgitation     Past Medical History:   Diagnosis Date   • ASD (atrial septal defect)    • Atrial flutter (CMS/HCC) 2020    Added automatically from request for surgery [id number]   • CKD (chronic kidney disease) 10/6/2020   • Diabetes (CMS/HCC)    • Hypertension      Past Surgical History:   Procedure Laterality Date   • CHOLECYSTECTOMY       General Information     Row Name 10/08/20 1138          Physical Therapy Time and Intention    Document Type  therapy note (daily note)  -KG     Mode of Treatment  physical therapy  -KG     Row Name 10/08/20 1138          General Information    Patient Profile Reviewed  yes  -KG     Existing Precautions/Restrictions  fall  -KG     Row Name 10/08/20 1138          Cognition    Orientation Status (Cognition)  oriented x 4  -KG     Row Name 10/08/20 1138          Safety Issues, Functional Mobility    Impairments Affecting Function (Mobility)  endurance/activity tolerance;pain;strength  -KG       User Key  (r) = Recorded By, (t) = Taken By, (c) = Cosigned By    Initials Name Provider Type    CHYNA Xavier  Kyara Physical Therapist        Mobility     Row Name 10/08/20 1138          Bed Mobility    Bed Mobility  supine-sit-supine  -KG     Supine-Sit-Supine [place] (Bed Mobility)  standby assist  -KG     Assistive Device (Bed Mobility)  bed rails;head of bed elevated  -KG     Comment (Bed Mobility)  cues for handrails  -KG     Row Name 10/08/20 1138          Transfers    Comment (Transfers)  cues to push up from bed  -KG     Row Name 10/08/20 1138          Sit-Stand Transfer    Sit-Stand [place] (Transfers)  contact guard;1 person assist  -KG     Assistive Device (Sit-Stand Transfers)  walker, front-wheeled  -KG     Row Name 10/08/20 1138          Gait/Stairs (Locomotion)    [place] Level (Gait)  contact guard;1 person assist  -KG     Assistive Device (Gait)  walker, front-wheeled  -KG     Distance in Feet (Gait)  120  -KG     Deviations/Abnormal Patterns (Gait)  jack decreased;stride length decreased  -KG     Bilateral Gait Deviations  forward flexed posture  -KG     Comment (Gait/Stairs)  Pt able to ambulate 120', gait belt and handrails in hallway for support.  Pt mostly limited by stamina and required 2 standing breaks.  -KG       User Key  (r) = Recorded By, (t) = Taken By, (c) = Cosigned By    Initials Name Provider Type    KG Kyara Xavier Physical Therapist        Obj/Interventions     Row Name 10/08/20 1141          Motor Skills    Therapeutic Exercise  knee  -KG     Row Name 10/08/20 1141          Knee (Therapeutic Exercise)    Knee (Therapeutic Exercise)  AROM (active range of motion)  -KG     Knee AROM (Therapeutic Exercise)  bilateral;LAQ (long arc quad);10 repetitions STS x10  -KG     Row Name 10/08/20 1141          Balance    Static Sitting Balance  WFL  -KG     Dynamic Sitting Balance  WFL  -KG     Static Standing Balance  WFL  -KG     Dynamic Standing Balance  mild impairment  -KG       User Key  (r) = Recorded By, (t) = Taken By, (c) = Cosigned By    Initials Name Provider  Type    KG Kyara Xavier Physical Therapist        Goals/Plan    No documentation.       Clinical Impression     Row Name 10/08/20 1141          Pain Scale: Numbers Pre/Post-Treatment    Pretreatment Pain Rating  0/10 - no pain  -KG     Posttreatment Pain Rating  0/10 - no pain  -KG     Row Name 10/08/20 1141          Plan of Care Review    Plan of Care Reviewed With  patient;spouse  -KG     Progress  no change  -KG     Outcome Summary  Pt able to ambulate 120', gait belt and handrails in hallway for support.  Pt mostly limited by stamina and required 2 standing breaks.  -KG     Row Name 10/08/20 1141          Vital Signs    Pre Systolic BP Rehab  132  -KG     Pre Treatment Diastolic BP  84  -KG     Post Systolic BP Rehab  151  -KG     Post Treatment Diastolic BP  92  -KG     Pretreatment Heart Rate (beats/min)  85  -KG     Intratreatment Heart Rate (beats/min)  114  -KG     Posttreatment Heart Rate (beats/min)  85  -KG     Row Name 10/08/20 1141          Positioning and Restraints    Pre-Treatment Position  in bed  -KG     Post Treatment Position  bed  -KG     In Bed  fowlers;encouraged to call for assist;call light within reach  -KG       User Key  (r) = Recorded By, (t) = Taken By, (c) = Cosigned By    Initials Name Provider Type    KG Kyara Xavier Physical Therapist        Outcome Measures     Row Name 10/08/20 1143          How much help from another person do you currently need...    Turning from your back to your side while in flat bed without using bedrails?  4  -KG     Moving from lying on back to sitting on the side of a flat bed without bedrails?  3  -KG     Moving to and from a bed to a chair (including a wheelchair)?  3  -KG     Standing up from a chair using your arms (e.g., wheelchair, bedside chair)?  3  -KG     Climbing 3-5 steps with a railing?  3  -KG     To walk in hospital room?  3  -KG     AM-PAC 6 Clicks Score (PT)  19  -KG     Row Name 10/08/20 1143          Functional Assessment     Outcome Measure Options  AM-PAC 6 Clicks Basic Mobility (PT)  -KG       User Key  (r) = Recorded By, (t) = Taken By, (c) = Cosigned By    Initials Name Provider Type    Kyara De Anda Physical Therapist        Physical Therapy Education                 Title: PT OT SLP Therapies (In Progress)     Topic: Physical Therapy (Done)     Point: Mobility training (Done)     Learning Progress Summary           Patient Acceptance, E,TB, VU by KG at 10/8/2020 1143    Acceptance, E, VU by LM at 10/6/2020 1142                   Point: Home exercise program (Done)     Learning Progress Summary           Patient Acceptance, E,TB, VU by KG at 10/8/2020 1143                   Point: Precautions (Done)     Learning Progress Summary           Patient Acceptance, E,TB, VU by KG at 10/8/2020 1143    Acceptance, E, VU by LM at 10/6/2020 1142                               User Key     Initials Effective Dates Name Provider Type Discipline    LM 07/24/19 -  Celena Rubio, PT Physical Therapist PT    KG 08/28/19 -  Kyara Xavier Physical Therapist PT              PT Recommendation and Plan     Plan of Care Reviewed With: patient, spouse  Progress: no change  Outcome Summary: Pt able to ambulate 120', gait belt and handrails in hallway for support.  Pt mostly limited by stamina and required 2 standing breaks.     Time Calculation:   PT Charges     Row Name 10/08/20 1144             Time Calculation    Start Time  1104  -KG      PT Received On  10/08/20  -KG      PT Goal Re-Cert Due Date  10/16/20  -KG         Time Calculation- PT    Total Timed Code Minutes- PT  25 minute(s)  -KG         Timed Charges    43674 - PT Therapeutic Exercise Minutes  10  -KG      44782 - Gait Training Minutes   15  -KG        User Key  (r) = Recorded By, (t) = Taken By, (c) = Cosigned By    Initials Name Provider Type    Kyara De Anda Physical Therapist        Therapy Charges for Today     Code Description Service Date Service Provider Modifiers  Qty    [id number]  PT THER PROC EA 15 MIN 10/8/2020 Kyara Xavier GP 1    [id number] HC GAIT TRAINING EA 15 MIN 10/8/2020 Kyara Xavier GP 1          PT G-Codes  Outcome Measure Options: AM-PAC 6 Clicks Basic Mobility (PT)  AM-PAC 6 Clicks Score (PT): 19  AM-PAC 6 Clicks Score (OT): 21    Kyara Xavier  10/8/2020

## 2020-10-08 NOTE — PROGRESS NOTES
"  [facility]  PROGRESS NOTE    Date of Admission: 10/6/2020  Date of Service: 10/08/20    Primary Care Physician: Christiane Peter APRN    Chief Complaint: f/u PAF, anasarca, severe TR   Problem List:   1. Paroxysmal Atrial Flutter  a. Diagnosed approximately 2017  b. ECV x3, last in March 2020  c. Echo 7/2020: biatrial enlargement, normal LVSF with abnormal septal motion;  moderately enlarged RV with normal contractility, mild MR, mod TR  d. Recurrent atrial flutter with RVR August and September 2020   2. History of ASD repair  a. Surgical ASD repair, 1990, [place]  b. Echo 10/6/2020 Dr. Mccray:  EF 56-60%, Right heart enlargement with severe TR. Septal motion suggests RV volume overload. Septal \"patch\" with no shunting by agitated saline. PA pressure normal to slightly elevated, RA pressure elevated. Mild MR, aortic valve is functionally normal.  3. Essential Hypertension   4. Hyperlipidemia; intolerant to Lipitor   5. IDDM2 with peripheral neuropathy   6. Varicose veins   7. Lumbar spine disease  8. Cholecystitis/pancreatitis, s/p cholecystectomy August 2020   9. Anasarca and ascites? October 2020     Subjective      HPI:  Events are noted. She is \"comfortable' at rest.      Objective   Vitals: /91 (BP Location: Right arm, Patient Position: Sitting)   Pulse 101   Temp 98 °F (36.7 °C) (Oral)   Resp 18   Ht 172.7 cm (68\")   Wt 86.5 kg (190 lb 12.8 oz)   SpO2 98%   BMI 29.01 kg/m²     Physical Exam:  GENERAL: Alert, cooperative, in no acute distress.   HEENT: Normocephalic, no jugular venous distention  HEART: Regular rhythm, normal rate, and no murmurs, gallops, or rubs.   LUNGS: Clear to auscultation bilaterally. No wheezing, rales or rhonchi.  ABDOMEN: Not examined.  NEUROLOGIC: No focal abnormalities involving strength or sensation are noted.   EXTREMITIES: No clubbing, cyanosis    Results:  Results from last 7 days   Lab Units 10/06/20  0052   WBC 10*3/mm3 7.77 " "  HEMOGLOBIN g/dL 10.1*   HEMATOCRIT % 31.2*   PLATELETS 10*3/mm3 342     Results from last 7 days   Lab Units 10/06/20  0052   SODIUM mmol/L 120*   POTASSIUM mmol/L 4.6   CHLORIDE mmol/L 83*   CO2 mmol/L 27.0   BUN mg/dL 29*   CREATININE mg/dL 1.18*   GLUCOSE mg/dL 217*      Lab Results   Component Value Date    AST 23 10/06/2020    ALT 14 10/06/2020       Results from last 7 days   Lab Units 10/06/20  0052   PROTIME Seconds 24.2*   INR  2.20*   APTT seconds 43.4*         Results from last 7 days   Lab Units 10/06/20  0052   PROBNP pg/mL 2,518.0*       Intake/Output Summary (Last 24 hours) at 10/8/2020 1009  Last data filed at 10/8/2020 0700  Gross per 24 hour   Intake 100 ml   Output 1400 ml   Net -1300 ml     I personally reviewed the patient's EKG/Telemetry data    Radiology Data:   Echo 10/6/20:  Interpretation Summary    · The right heart is enlarged. Left heart chamber sizes are normal.  · There is severe tricuspid regurgitation. Septal motion (ventricular) suggests an RV volume overload.  · The atrial septum is abnormal. A septal \"patch\" is suggested. There is no evidence of shunting by agitated saline study.  · The estimated PA pressure is normal to slightly elevated. The estimated RA pressure is elevated.  · There is moderate concentric left ventricular hypertrophy.  · Global and segmental LV wall motion is normal with an estimated LV ejection fraction of 56%-60%. Estimated RV systolic function is normal.  · There are fibrocalcific changes in the aortic valve. The valve is functionally normal.  · Calcium is noted in the mitral annulus and leaflets. Mild mitral regurgitation is present.  · There are no other important findings on this study.  · Left ventricular ejection fraction appears to be 56 - 60%.  · Left ventricular wall thickness is consistent with concentric hypertrophy.     Current Medications:  aspirin, 81 mg, Oral, Daily  cefTRIAXone, 1 g, Intravenous, Q24H  furosemide, 20 mg, Oral, BID  insulin " "detemir, 15 Units, Subcutaneous, Q12H  insulin lispro, 0-14 Units, Subcutaneous, TID AC  insulin lispro, 5 Units, Subcutaneous, TID With Meals  rivaroxaban, 15 mg, Oral, Daily With Dinner  sodium chloride, 10 mL, Intravenous, Q12H           Assessment and Plan:   1.  \"Abnormal Echo\" with Severe TR              -  Echo 10/6/2020 see above- likely she has PA HTN underestimated by echo with severe TR from late closure of ASD   - plan for RHC tomorrow       2.  Persistent Atrial Flutter              -  Remote ECV x 3 with most recent March 2020.              -  Rates currently controlled and patient with minimal symptoms              -  On Xarelto for stroke prevention.              -  plan for RFA of Aflutter per Dr. Garcia next week      3.  Anasarca/Ascities              -  Presents with severe abdominal distension and discomfort.               -  CT Abd/Pelvis reveals anasarca and ascites.     -  Minimal to no ascites at time of CT guided paracentesis (50cc)      4.  Hyponatremia   - per hospitalists      Case reviewed. Needs:    - Right heart cath (scheduled AM 10/9/2020)  - A flutter ablation scheduled next week.  - Surgical Consultation .... her GI issues may not be related to cardiac issues.  I have consulted [facility].        "

## 2020-10-08 NOTE — PROGRESS NOTES
[facility]  PROGRESS NOTE    Patient Name: Ivory Carr  : 1945  MRN: [id number]    Date of Admission: 10/6/2020  Primary Care Physician: Christiane Peter APRN    Subjective   Subjective     CC: Follow-up dyspnea with exertion, abdominal distention      HPI: No acute events overnight, patient that she feels a touch better, however still has some dyspnea with exertion    Review of Systems  Gen- No fevers, chills  CV- No chest pain, palpitations  Resp- No cough, +dyspnea  GI- No N/V/D, +abd pain    All other systems reviewed and are negative    Objective   Objective     Vital Signs:   Temp:  [97.6 °F (36.4 °C)-98.1 °F (36.7 °C)] 98 °F (36.7 °C)  Heart Rate:  [] 101  Resp:  [16-18] 18  BP: (101-162)/(54-96) 134/91        Physical Exam:  Constitutional: No acute distress, awake, alert  HENT: NCAT, mucous membranes moist  Respiratory: Clear to auscultation bilaterally, respiratory effort normal   Cardiovascular: RRR, no murmurs, rubs, or gallops, palpable pedal pulses bilaterally  Gastrointestinal: Positive bowel sounds, soft, moderately tender to palpation distended  Musculoskeletal: bilateral ankle edema, 2+  Psychiatric: Appropriate affect, cooperative  Neurologic: Oriented x 3, nonfocal  Skin: No rashes    Results Reviewed:  Results from last 7 days   Lab Units 10/06/20  0052   WBC 10*3/mm3 7.77   HEMOGLOBIN g/dL 10.1*   HEMATOCRIT % 31.2*   PLATELETS 10*3/mm3 342   INR  2.20*     Results from last 7 days   Lab Units 10/06/20  0052   SODIUM mmol/L 120*   POTASSIUM mmol/L 4.6   CHLORIDE mmol/L 83*   CO2 mmol/L 27.0   BUN mg/dL 29*   CREATININE mg/dL 1.18*   GLUCOSE mg/dL 217*   CALCIUM mg/dL 8.8   ALT (SGPT) U/L 14   AST (SGOT) U/L 23   PROBNP pg/mL 2,518.0*     Estimated Creatinine Clearance: 45.9 mL/min (A) (by C-G formula based on SCr of 1.18 mg/dL (H)).    Microbiology Results Abnormal     Procedure Component Value - Date/Time    Urine Culture - Urine,  Urine, Clean Catch [[id number]]  (Abnormal)  (Susceptibility) Collected: 10/06/20 0056    Lab Status: Final result Specimen: Urine, Clean Catch Updated: 10/08/20 0136     Urine Culture >100,000 CFU/mL Escherichia coli    Susceptibility      Escherichia coli     YISSEL     Amikacin Susceptible     Ampicillin Resistant     Ampicillin + Sulbactam Intermediate     Cefazolin Susceptible     Cefepime Susceptible     Ceftazidime Susceptible     Ceftriaxone Susceptible     Gentamicin Resistant     Levofloxacin Susceptible     Nitrofurantoin Susceptible     Piperacillin + Tazobactam Susceptible     Tetracycline Resistant     Tobramycin Intermediate     Trimethoprim + Sulfamethoxazole Resistant                    Body Fluid Culture - Body Fluid, Peritoneum [[id number]] Collected: 10/07/20 1436    Lab Status: Preliminary result Specimen: Body Fluid from Peritoneum Updated: 10/07/20 1625     Gram Stain Few (2+) WBCs seen      No organisms seen    COVID PRE-OP / PRE-PROCEDURE SCREENING ORDER (NO ISOLATION) - Swab, Nasopharynx [[id number]]  (Normal) Collected: 10/06/20 0420    Lab Status: Final result Specimen: Swab from Nasopharynx Updated: 10/06/20 0606    Narrative:      The following orders were created for panel order COVID PRE-OP / PRE-PROCEDURE SCREENING ORDER (NO ISOLATION) - Swab, Nasopharynx.  Procedure                               Abnormality         Status                     ---------                               -----------         ------                     COVID-19,CEPHEID,FANTA IN-...[[id number]]  Normal              Final result                 Please view results for these tests on the individual orders.    COVID-19,CEPHEID,FANTA IN-HOUSE(OR EMERGENT/ADD-ON),NP SWAB IN TRANSPORT MEDIA 3-4 HR TAT - Swab, Nasopharynx [[id number]]  (Normal) Collected: 10/06/20 0420    Lab Status: Final result Specimen: Swab from Nasopharynx Updated: 10/06/20 0606     COVID19 Not Detected    Narrative:      Fact sheet for providers:  https://www.fda.gov/media/664679/download     Fact sheet for patients: https://www.fda.gov/media/031807/download          Imaging Results (Last 24 Hours)     Procedure Component Value Units Date/Time    CT Guided Paracentesis [[id number]] Collected: 10/07/20 1546     Updated: 10/07/20 1701    Narrative:      PROCEDURE: CT-guided paracentesis     Procedural Personnel  Attending physician(s): TRIXIE Dang M.D.  Fellow physician(s): None  Resident physician(s): None  Advanced practice provider(s): None     Pre-procedure diagnosis: Past medical history of type 2 diabetes, CKD  stage III,??atrial flutter s/p?cardioversion x2 with plans for ablation  by Dr. Garcia on?10/15, recent CCY,?she presents the ED with generalized  weakness,?and abdominal distension.?CT abdomen/pelvis does show  minimal/small volume ascites.  Post-procedure diagnosis: Same  Indication: Diagnostic  Additional clinical history: None     Complications: No immediate complications.       Impression:         CT-guided paracentesis with drainage of 50 mL of serous fluid. Fluid  sent for requested laboratory analysis     Plan:      Resume care by clinical team.  _______________________________________________________________     PROCEDURE SUMMARY:  - Limited CT  - CT-guided paracentesis  - Additional procedure(s): None     PROCEDURE DETAILS:     Pre-procedure  Consent: Informed consent for the procedure including risks, benefits  and alternatives was obtained and time-out was performed prior to the  procedure.  Preparation: The site was prepared and draped using maximal sterile  barrier technique including cutaneous antisepsis.     Anesthesia/sedation  Level of anesthesia/sedation: No sedation     Initial abdominal CT  Initial abdominal CT was performed.  Findings: Minimal/small volume ascites. A safe window for paracentesis  was identified.     Paracentesis  Local anesthesia was administered. The peritoneal cavity was accessed  and needle position confirmed  "by CT. Ascites was drained. The needle was  then removed, and a sterile bandage was applied.  Paracentesis access technique: Direct CT fluoroscopic guidance  Needle placed: 22-gauge Chiba  Post-drainage CT: Not performed     Radiation Dose  CT dose length product (mGy-cm): 798      Additional Details  Additional description of procedure: None  Equipment details: None  Specimens removed: Abdominal fluid  Estimated blood loss (mL): Less than 10  Standardized report: Paracentesis     Attestation  I was present and scrubbed for the entire procedure. Imaging reviewed.  Agree with final report as written.     This report was finalized on 10/7/2020 3:58 PM by Torin Dang.             Results for orders placed during the hospital encounter of 10/06/20   Transthoracic Echo Complete With Contrast if Necessary Per Protocol    Addendum · The right heart is enlarged. Left heart chamber sizes are normal. · There is severe tricuspid regurgitation. Septal motion (ventricular)  suggests an RV volume overload. · The atrial septum is abnormal. A septal \"patch\" is suggested. There is  no evidence of shunting by agitated saline study. · The estimated PA pressure is normal to slightly elevated. The estimated  RA pressure is elevated. · There is moderate concentric left ventricular hypertrophy. · Global and segmental LV wall motion is normal with an estimated LV  ejection fraction of 56%-60%. Estimated RV systolic function is normal. · There are fibrocalcific changes in the aortic valve. The valve is  functionally normal. · Calcium is noted in the mitral annulus and leaflets. Mild mitral  regurgitation is present. · There are no other important findings on this study. · Left ventricular ejection fraction appears to be 56 - 60%. · Left ventricular wall thickness is consistent with concentric  hypertrophy.        Irvin Mccray MD 10/6/2020  6:07 PM          Narrative · The right heart is enlarged. Left heart chamber sizes are " "normal.  · There is severe tricuspid regurgitation. Septal motion (ventricular)   suggests an RV volume overload.  · The atrial septum is abnormal. A septal \"patch\" is suggested. There is   no evidence of shunting by agitated saline study.  · The estimated PA pressure is normal to slightly elevated. The estimated   RA pressure is elevated.  · There is moderate concentric left ventricular hypertrophy.  · Global and segmental LV wall motion is normal with an estimated LV   ejection fraction of 56%-60%.  · There are fibrocalcific changes in the aortic valve. The valve is   functionally normal.  · Calcium is noted in the mitral annulus and leaflets. Mild mitral   regurgitation is present.  · There are no other important findings on this study.          I have reviewed the medications:  Scheduled Meds:aspirin, 81 mg, Oral, Daily  cefTRIAXone, 1 g, Intravenous, Q24H  furosemide, 20 mg, Oral, BID  insulin detemir, 15 Units, Subcutaneous, Q12H  insulin lispro, 0-14 Units, Subcutaneous, TID AC  insulin lispro, 5 Units, Subcutaneous, TID With Meals  rivaroxaban, 15 mg, Oral, Daily With Dinner  sodium chloride, 10 mL, Intravenous, Q12H      Continuous Infusions:   PRN Meds:.•  acetaminophen  •  dextrose  •  dextrose  •  glucagon (human recombinant)  •  melatonin  •  sodium chloride  •  [COMPLETED] Insert peripheral IV **AND** sodium chloride  •  sodium chloride    Assessment/Plan   Assessment & Plan     Active Hospital Problems    Diagnosis  POA   • **Hyponatremia [E87.1]  Yes   • Severe tricuspid regurgitation [I07.1]  Yes   • DM2 (diabetes mellitus, type 2) (CMS/HCC) [E11.9]  Yes   • Anasarca and ascites [R60.1]  Yes   • Elevated serum creatinine [R79.89]  Yes   • Acute UTI (urinary tract infection) [N39.0]  Yes   • Anemia [D64.9]  Yes   • Acute UTI [N39.0]  Yes   • CKD (chronic kidney disease) [N18.9]  Yes   • Atrial flutter (CMS/HCC) [I48.92]  Yes      Resolved Hospital Problems   No resolved problems to display.      "   Brief Hospital Course to date:  Ivory Carr is a 75 y.o. female with past medical history of type 2 diabetes, CKD stage III,  atrial flutter s/p cardioversion x2 with plans for ablation by Dr. Garcia on 10/15, recent CCY, she presents the ED with generalized weakness, and abdominal distension.      Plan   Ascites  -Etiology remains unknown, cardiac vs liver, however, CT abdomen pelvis shows normal liver, INR is stable, platelet count is normal, as such doubt liver as etiology. Cardiac ascites seems more likely.  -GI following, she is s/p CT guided paracentesis 10/7, f/u fluid studies    Dyspnea  -Echo shows severe TR  with RV volume overload, EF 56 to 60%, cardiology following, seen by Dr. Mccray who suspects underlying pulmonary hypertension, plan for RHC tomorrow 10/9     Atrial flutter  -stable, rate is currently controlled continue on coagulation with Xarelto  -Plan for an ablation 10/15 with Dr. Garcia     Poorly controlled type 2 diabetes, last A1c> 14%  -FSBG's were elevated, add basal levemir 15 units BID and continue ssi, adjust as warranted    E. coli UTI-continue IV Rocephin    Hyponatremia  -Suspect this is hypervolemic hyponatremia considering the above  -f/u urine studies, repeat BMP in a.m.     CKD stage 3-renal function seems to be at baseline     DVT Prophylaxis: xarelto     Disposition: TBD.    CODE STATUS:   Code Status and Medical Interventions:   Ordered at: 10/06/20 3947     Level Of Support Discussed With:    Patient     Code Status:    CPR     Medical Interventions (Level of Support Prior to Arrest):    Full       Marilee Chavez MD  10/08/20

## 2020-10-08 NOTE — PROGRESS NOTES
Ascites has high SAAG and high protein, consistent with cardiac ascites.  Right heart cath scheduled for tomorrow.  Personal review of CT scan shows no excessive stool burden.    Will follow.

## 2020-10-08 NOTE — THERAPY TREATMENT NOTE
Patient Name: Ivory Carr  : 1945    MRN: [id number]                              Today's Date: 10/8/2020       Admit Date: 10/6/2020    Visit Dx:     ICD-10-CM ICD-9-CM   1. Acute UTI  N39.0 599.0   2. Nausea  R11.0 787.02   3. Abdominal distention  R14.0 787.3   4. Other ascites  R18.8 789.59   5. Paroxysmal atrial flutter (CMS/HCC)  I48.92 427.32   6. Chronic anticoagulation  Z79.01 V58.61   7. History of diabetes mellitus  Z86.39 V12.29   8. History of cholecystectomy  Z90.49 V45.79   9. Acute renal insufficiency  N28.9 593.9   10. Hyponatremia  E87.1 276.1   11. Severe tricuspid regurgitation  I07.1 397.0   12. History of repair of congenital atrial septal defect (ASD)  Z87.74 V13.65   13. Anasarca and ascites  R60.1 782.3     Patient Active Problem List   Diagnosis   • Atrial flutter (CMS/HCC)   • DM2 (diabetes mellitus, type 2) (CMS/HCC)   • Anasarca and ascites   • Elevated serum creatinine   • Hyponatremia   • Acute UTI (urinary tract infection)   • Anemia   • Acute UTI   • CKD (chronic kidney disease)   • Severe tricuspid regurgitation     Past Medical History:   Diagnosis Date   • ASD (atrial septal defect)    • Atrial flutter (CMS/HCC) 2020    Added automatically from request for surgery [id number]   • CKD (chronic kidney disease) 10/6/2020   • Diabetes (CMS/HCC)    • Hypertension      Past Surgical History:   Procedure Laterality Date   • CHOLECYSTECTOMY       General Information     Row Name 10/08/20 1445          OT Time and Intention    Document Type  therapy note (daily note)  -AC     Mode of Treatment  occupational therapy  -AC     Row Name 10/08/20 1449          General Information    Patient Profile Reviewed  yes  -AC     Existing Precautions/Restrictions  fall  -AC     Row Name 10/08/20 1447          Cognition    Orientation Status (Cognition)  oriented x 4  -AC     Row Name 10/08/20 1446          Safety Issues, Functional Mobility    Safety Issues Affecting Function (Mobility)   safety precaution awareness  -     Impairments Affecting Function (Mobility)  endurance/activity tolerance;strength  -       User Key  (r) = Recorded By, (t) = Taken By, (c) = Cosigned By    Initials Name Provider Type    Rachel Klein, OT Occupational Therapist        Mobility/ADL's     Row Name 10/08/20 1445          Bed Mobility    Bed Mobility  supine-sit;sit-supine  -     Supine-Sit Young (Bed Mobility)  modified independence  -     Sit-Supine Young (Bed Mobility)  modified independence  -     Assistive Device (Bed Mobility)  bed rails;head of bed elevated  -     Row Name 10/08/20 1445          Transfers    Transfers  sit-stand transfer  -     Sit-Stand Young (Transfers)  supervision  -     Row Name 10/08/20 1445          Sit-Stand Transfer    Assistive Device (Sit-Stand Transfers)  walker, front-wheeled  -     Row Name 10/08/20 1445          Functional Mobility    Functional Mobility- Ind. Level  supervision required  -     Functional Mobility- Device  rolling walker  -     Functional Mobility-Distance (Feet)  200  -     Row Name 10/08/20 1445          Activities of Daily Living    BADL Assessment/Intervention  lower body dressing  -     Row Name 10/08/20 144          Lower Body Dressing Assessment/Training    Young Level (Lower Body Dressing)  doff;don;supervision  -     Position (Lower Body Dressing)  edge of bed sitting  -     Comment (Lower Body Dressing)  pt declined need for use of AE today  -       User Key  (r) = Recorded By, (t) = Taken By, (c) = Cosigned By    Initials Name Provider Type    Rachel Klein, OT Occupational Therapist        Obj/Interventions     Row Name 10/08/20 1445          Shoulder (Therapeutic Exercise)    Shoulder (Therapeutic Exercise)  strengthening exercise  -     Shoulder Strengthening (Therapeutic Exercise)  bilateral;flexion;extension;horizontal aBduction/aDduction;external rotation;internal  rotation;resistance tubing;yellow;10 repetitions  -     Row Name 10/08/20 1445          Elbow/Forearm (Therapeutic Exercise)    Elbow/Forearm (Therapeutic Exercise)  strengthening exercise  -     Elbow/Forearm Strengthening (Therapeutic Exercise)  bilateral;flexion;extension;resistance band;yellow;10 repetitions  -     Row Name 10/08/20 1445          Therapeutic Exercise    Therapeutic Exercise  shoulder;elbow/forearm  -       User Key  (r) = Recorded By, (t) = Taken By, (c) = Cosigned By    Initials Name Provider Type    Rachel Klein, OT Occupational Therapist        Goals/Plan    No documentation.       Clinical Impression     Row Name 10/08/20 1445          Pain Scale: Numbers Pre/Post-Treatment    Pretreatment Pain Rating  0/10 - no pain  -     Posttreatment Pain Rating  0/10 - no pain  -[facility] Name 10/08/20 1445          Plan of Care Review    Plan of Care Reviewed With  patient;spouse  -     Outcome Summary  Pt completed 10 reps BUE strengthening TE with yellow tband.  Pt declined need for AE to complete LBD, and donned/doffed socks given supervision.  Pt modified independent with bed mobility,  sueprvision to ambulate 200 ft with RW.  Pt with good progress toward OT goals, but limited by decreased activity tolerance.  -     Row Name 10/08/20 1445          Therapy Plan Review/Discharge Plan (OT)    Anticipated Discharge Disposition (OT)  home with assist  -[facility] Name 10/08/20 1445          Positioning and Restraints    Pre-Treatment Position  in bed  -     Post Treatment Position  bed  -       User Key  (r) = Recorded By, (t) = Taken By, (c) = Cosigned By    Initials Name Provider Type    Rachel Klein, OT Occupational Therapist        Outcome Measures     Row Name 10/08/20 1445          How much help from another is currently needed...    Putting on and taking off regular lower body clothing?  3  -AC     Bathing (including washing, rinsing, and drying)  3  -AC     Toileting  (which includes using toilet bed pan or urinal)  3  -AC     Putting on and taking off regular upper body clothing  4  -AC     Taking care of personal grooming (such as brushing teeth)  4  -AC     Eating meals  4  -AC     AM-PAC 6 Clicks Score (OT)  21  -     Row Name 10/08/20 1445          Functional Assessment    Outcome Measure Options  AM-PAC 6 Clicks Daily Activity (OT)  -       User Key  (r) = Recorded By, (t) = Taken By, (c) = Cosigned By    Initials Name Provider Type    AC Rachel Hernadez, OT Occupational Therapist        Occupational Therapy Education                 Title: PT OT SLP Therapies (In Progress)     Topic: Occupational Therapy (In Progress)     Point: ADL training (Done)     Description:   Instruct learner(s) on proper safety adaptation and remediation techniques during self care or transfers.   Instruct in proper use of assistive devices.              Learning Progress Summary           Patient Acceptance, E, VU by  at 10/8/2020 1445    Acceptance, E,D, VU,NR by  at 10/6/2020 1420                   Point: Home exercise program (Not Started)     Description:   Instruct learner(s) on appropriate technique for monitoring, assisting and/or progressing therapeutic exercises/activities.              Learner Progress:  Not documented in this visit.          Point: Precautions (Done)     Description:   Instruct learner(s) on prescribed precautions during self-care and functional transfers.              Learning Progress Summary           Patient Acceptance, E,D, VU,NR by  at 10/6/2020 1420                   Point: Body mechanics (Done)     Description:   Instruct learner(s) on proper positioning and spine alignment during self-care, functional mobility activities and/or exercises.              Learning Progress Summary           Patient Acceptance, E,D, VU,NR by  at 10/6/2020 1420                               User Key     Initials Effective Dates Name Provider Type [facility] 06/23/15  -  Rachel Hernadez, OT Occupational Therapist OT     07/18/19 -  Frida Parks OT Occupational Therapist OT              OT Recommendation and Plan  Retired Outcome Summary/Treatment Plan (OT)  Anticipated Discharge Disposition (OT): home with assist  Plan of Care Review  Plan of Care Reviewed With: patient, spouse  Outcome Summary: Pt completed 10 reps BUE strengthening TE with yellow tband.  Pt declined need for AE to complete LBD, and donned/doffed socks given supervision.  Pt modified independent with bed mobility,  sueprvision to ambulate 200 ft with RW.  Pt with good progress toward OT goals, but limited by decreased activity tolerance.  Plan of Care Reviewed With: patient, spouse  Outcome Summary: Pt completed 10 reps BUE strengthening TE with yellow tband.  Pt declined need for AE to complete LBD, and donned/doffed socks given supervision.  Pt modified independent with bed mobility,  sueprvision to ambulate 200 ft with RW.  Pt with good progress toward OT goals, but limited by decreased activity tolerance.     Time Calculation:   Time Calculation- OT     Row Name 10/08/20 1445 10/08/20 1144          Time Calculation- OT    OT Start Time  1445  -AC  --     OT Received On  10/08/20  -AC  --     OT Goal Re-Cert Due Date  10/16/20  -AC  --        Timed Charges    50382 - OT Therapeutic Exercise Minutes  10  -AC  --     35979 - Gait Training Minutes   --  15  -KG     00941 - OT Therapeutic Activity Minutes  10  -AC  --     99235 - OT Self Care/Mgmt Minutes  5  -AC  --       User Key  (r) = Recorded By, (t) = Taken By, (c) = Cosigned By    Initials Name Provider Type    AC Rachel Hernadez, OT Occupational Therapist     Kyara Xavier Physical Therapist        Therapy Charges for Today     Code Description Service Date Service Provider Modifiers Qty    37869725807  OT THERAPEUTIC ACT EA 15 MIN 10/8/2020 Rachel Hernadez OT GO 1    5219437 HC OT THER PROC EA 15 MIN 10/8/2020 Rachel Hernadez OT GO 1                Rachel Hernadez, OT  10/8/2020

## 2020-10-09 LAB
ANION GAP SERPL CALCULATED.3IONS-SCNC: 9 MMOL/L (ref 5–15)
BH CV ECHO MEAS - AO MAX PG (FULL): 9.7 MMHG
BH CV ECHO MEAS - AO MAX PG: 12 MMHG
BH CV ECHO MEAS - AO MEAN PG (FULL): 5.6 MMHG
BH CV ECHO MEAS - AO MEAN PG: 7 MMHG
BH CV ECHO MEAS - AO ROOT AREA (BSA CORRECTED): 1.4
BH CV ECHO MEAS - AO ROOT AREA: 5.8 CM^2
BH CV ECHO MEAS - AO ROOT DIAM: 2.7 CM
BH CV ECHO MEAS - AO V2 MAX: 173.3 CM/SEC
BH CV ECHO MEAS - AO V2 MEAN: 118 CM/SEC
BH CV ECHO MEAS - AO V2 VTI: 42.5 CM
BH CV ECHO MEAS - ASC AORTA: 2.5 CM
BH CV ECHO MEAS - AVA(I,A): 1.1 CM^2
BH CV ECHO MEAS - AVA(I,D): 1.1 CM^2
BH CV ECHO MEAS - AVA(V,A): 1.1 CM^2
BH CV ECHO MEAS - AVA(V,D): 1.1 CM^2
BH CV ECHO MEAS - BSA(HAYCOCK): 2 M^2
BH CV ECHO MEAS - BSA: 2 M^2
BH CV ECHO MEAS - BZI_BMI: 28.7 KILOGRAMS/M^2
BH CV ECHO MEAS - BZI_METRIC_HEIGHT: 172.7 CM
BH CV ECHO MEAS - BZI_METRIC_WEIGHT: 85.7 KG
BH CV ECHO MEAS - EDV(CUBED): 30.9 ML
BH CV ECHO MEAS - EDV(MOD-SP2): 45.9 ML
BH CV ECHO MEAS - EDV(MOD-SP4): 44 ML
BH CV ECHO MEAS - EDV(TEICH): 39.1 ML
BH CV ECHO MEAS - EF(CUBED): 93.7 %
BH CV ECHO MEAS - EF(MOD-BP): 56.9 %
BH CV ECHO MEAS - EF(MOD-SP2): 64.5 %
BH CV ECHO MEAS - EF(MOD-SP4): 54.5 %
BH CV ECHO MEAS - EF(TEICH): 90.5 %
BH CV ECHO MEAS - ESV(CUBED): 1.9 ML
BH CV ECHO MEAS - ESV(MOD-SP2): 16.3 ML
BH CV ECHO MEAS - ESV(MOD-SP4): 20 ML
BH CV ECHO MEAS - ESV(TEICH): 3.7 ML
BH CV ECHO MEAS - FS: 60.2 %
BH CV ECHO MEAS - IVS/LVPW: 0.96
BH CV ECHO MEAS - IVSD: 1.5 CM
BH CV ECHO MEAS - LA DIMENSION: 4 CM
BH CV ECHO MEAS - LA/AO: 1.5
BH CV ECHO MEAS - LAD MAJOR: 4.7 CM
BH CV ECHO MEAS - LAT PEAK E' VEL: 7.8 CM/SEC
BH CV ECHO MEAS - LATERAL E/E' RATIO: 19.8
BH CV ECHO MEAS - LV DIASTOLIC VOL/BSA (35-75): 22.1 ML/M^2
BH CV ECHO MEAS - LV IVRT: 0.07 SEC
BH CV ECHO MEAS - LV MASS(C)D: 163.1 GRAMS
BH CV ECHO MEAS - LV MASS(C)DI: 81.8 GRAMS/M^2
BH CV ECHO MEAS - LV MAX PG: 2.3 MMHG
BH CV ECHO MEAS - LV MEAN PG: 1.4 MMHG
BH CV ECHO MEAS - LV SYSTOLIC VOL/BSA (12-30): 10 ML/M^2
BH CV ECHO MEAS - LV V1 MAX: 75.1 CM/SEC
BH CV ECHO MEAS - LV V1 MEAN: 55.4 CM/SEC
BH CV ECHO MEAS - LV V1 VTI: 17.2 CM
BH CV ECHO MEAS - LVIDD: 3.1 CM
BH CV ECHO MEAS - LVIDS: 1.2 CM
BH CV ECHO MEAS - LVLD AP2: 6.3 CM
BH CV ECHO MEAS - LVLD AP4: 6.4 CM
BH CV ECHO MEAS - LVLS AP2: 4.9 CM
BH CV ECHO MEAS - LVLS AP4: 5.8 CM
BH CV ECHO MEAS - LVOT AREA (M): 2.5 CM^2
BH CV ECHO MEAS - LVOT AREA: 2.6 CM^2
BH CV ECHO MEAS - LVOT DIAM: 1.8 CM
BH CV ECHO MEAS - LVPWD: 1.5 CM
BH CV ECHO MEAS - MV A MAX VEL: 49 CM/SEC
BH CV ECHO MEAS - MV DEC SLOPE: 601.9 CM/SEC^2
BH CV ECHO MEAS - MV DEC TIME: 0.29 SEC
BH CV ECHO MEAS - MV E MAX VEL: 155 CM/SEC
BH CV ECHO MEAS - MV E/A: 3.2
BH CV ECHO MEAS - MV MAX PG: 13.9 MMHG
BH CV ECHO MEAS - MV MEAN PG: 4.3 MMHG
BH CV ECHO MEAS - MV P1/2T MAX VEL: 161.6 CM/SEC
BH CV ECHO MEAS - MV P1/2T: 78.6 MSEC
BH CV ECHO MEAS - MV V2 MAX: 185.6 CM/SEC
BH CV ECHO MEAS - MV V2 MEAN: 89.6 CM/SEC
BH CV ECHO MEAS - MV V2 VTI: 42.7 CM
BH CV ECHO MEAS - MVA P1/2T LCG: 1.4 CM^2
BH CV ECHO MEAS - MVA(P1/2T): 2.8 CM^2
BH CV ECHO MEAS - MVA(VTI): 1.1 CM^2
BH CV ECHO MEAS - PA ACC TIME: 0.1 SEC
BH CV ECHO MEAS - PA MAX PG: 2.2 MMHG
BH CV ECHO MEAS - PA PR(ACCEL): 34.6 MMHG
BH CV ECHO MEAS - PA V2 MAX: 73.8 CM/SEC
BH CV ECHO MEAS - RAP SYSTOLE: 15 MMHG
BH CV ECHO MEAS - RVSP: 33 MMHG
BH CV ECHO MEAS - SI(AO): 124.2 ML/M^2
BH CV ECHO MEAS - SI(CUBED): 14.5 ML/M^2
BH CV ECHO MEAS - SI(LVOT): 22.8 ML/M^2
BH CV ECHO MEAS - SI(MOD-SP2): 14.8 ML/M^2
BH CV ECHO MEAS - SI(MOD-SP4): 12 ML/M^2
BH CV ECHO MEAS - SI(TEICH): 17.7 ML/M^2
BH CV ECHO MEAS - SV(AO): 247.7 ML
BH CV ECHO MEAS - SV(CUBED): 29 ML
BH CV ECHO MEAS - SV(LVOT): 45.5 ML
BH CV ECHO MEAS - SV(MOD-SP2): 29.6 ML
BH CV ECHO MEAS - SV(MOD-SP4): 24 ML
BH CV ECHO MEAS - SV(TEICH): 35.3 ML
BH CV ECHO MEAS - TAPSE (>1.6): 1.3 CM
BH CV ECHO MEAS - TR MAX PG: 18 MMHG
BH CV ECHO MEAS - TR MAX VEL: 214.4 CM/SEC
BH CV VAS BP RIGHT ARM: NORMAL MMHG
BH CV XLRA - RV BASE: 4.6 CM
BH CV XLRA - RV LENGTH: 6.9 CM
BH CV XLRA - RV MID: 5.4 CM
BUN SERPL-MCNC: 21 MG/DL (ref 8–23)
BUN/CREAT SERPL: 20.6 (ref 7–25)
CALCIUM SPEC-SCNC: 9.3 MG/DL (ref 8.6–10.5)
CHLORIDE SERPL-SCNC: 93 MMOL/L (ref 98–107)
CO2 SERPL-SCNC: 29 MMOL/L (ref 22–29)
CREAT SERPL-MCNC: 1.02 MG/DL (ref 0.57–1)
GFR SERPL CREATININE-BSD FRML MDRD: 53 ML/MIN/1.73
GLUCOSE BLDC GLUCOMTR-MCNC: 130 MG/DL (ref 70–130)
GLUCOSE BLDC GLUCOMTR-MCNC: 215 MG/DL (ref 70–130)
GLUCOSE BLDC GLUCOMTR-MCNC: 284 MG/DL (ref 70–130)
GLUCOSE SERPL-MCNC: 118 MG/DL (ref 65–99)
LEFT ATRIUM VOLUME: 26.7 CM3
MAXIMAL PREDICTED HEART RATE: 143 BPM
POTASSIUM SERPL-SCNC: 4.6 MMOL/L (ref 3.5–5.2)
SODIUM SERPL-SCNC: 131 MMOL/L (ref 136–145)
STRESS TARGET HR: 122 BPM

## 2020-10-09 PROCEDURE — 82962 GLUCOSE BLOOD TEST: CPT

## 2020-10-09 PROCEDURE — 80048 BASIC METABOLIC PNL TOTAL CA: CPT | Performed by: INTERNAL MEDICINE

## 2020-10-09 PROCEDURE — C1769 GUIDE WIRE: HCPCS | Performed by: INTERNAL MEDICINE

## 2020-10-09 PROCEDURE — 93451 RIGHT HEART CATH: CPT | Performed by: INTERNAL MEDICINE

## 2020-10-09 PROCEDURE — 4A023N6 MEASUREMENT OF CARDIAC SAMPLING AND PRESSURE, RIGHT HEART, PERCUTANEOUS APPROACH: ICD-10-PCS | Performed by: INTERNAL MEDICINE

## 2020-10-09 PROCEDURE — 25010000002 CEFTRIAXONE PER 250 MG: Performed by: INTERNAL MEDICINE

## 2020-10-09 PROCEDURE — 25010000002 CEFTRIAXONE PER 250 MG: Performed by: PHYSICIAN ASSISTANT

## 2020-10-09 PROCEDURE — C1751 CATH, INF, PER/CENT/MIDLINE: HCPCS | Performed by: INTERNAL MEDICINE

## 2020-10-09 PROCEDURE — 63710000001 INSULIN DETEMIR PER 5 UNITS: Performed by: INTERNAL MEDICINE

## 2020-10-09 PROCEDURE — 99233 SBSQ HOSP IP/OBS HIGH 50: CPT | Performed by: INTERNAL MEDICINE

## 2020-10-09 PROCEDURE — 25010000002 MIDAZOLAM PER 1 MG: Performed by: INTERNAL MEDICINE

## 2020-10-09 PROCEDURE — C1894 INTRO/SHEATH, NON-LASER: HCPCS | Performed by: INTERNAL MEDICINE

## 2020-10-09 PROCEDURE — C1760 CLOSURE DEV, VASC: HCPCS | Performed by: INTERNAL MEDICINE

## 2020-10-09 PROCEDURE — 25010000002 FENTANYL CITRATE (PF) 100 MCG/2ML SOLUTION: Performed by: INTERNAL MEDICINE

## 2020-10-09 RX ORDER — BISACODYL 10 MG
10 SUPPOSITORY, RECTAL RECTAL DAILY
Status: DISCONTINUED | OUTPATIENT
Start: 2020-10-10 | End: 2020-10-09

## 2020-10-09 RX ORDER — POLYETHYLENE GLYCOL 3350 17 G/17G
17 POWDER, FOR SOLUTION ORAL DAILY PRN
Status: DISCONTINUED | OUTPATIENT
Start: 2020-10-09 | End: 2020-10-12 | Stop reason: HOSPADM

## 2020-10-09 RX ORDER — LIDOCAINE HYDROCHLORIDE 10 MG/ML
INJECTION, SOLUTION EPIDURAL; INFILTRATION; INTRACAUDAL; PERINEURAL AS NEEDED
Status: DISCONTINUED | OUTPATIENT
Start: 2020-10-09 | End: 2020-10-09 | Stop reason: HOSPADM

## 2020-10-09 RX ORDER — MIDAZOLAM HYDROCHLORIDE 1 MG/ML
INJECTION INTRAMUSCULAR; INTRAVENOUS AS NEEDED
Status: DISCONTINUED | OUTPATIENT
Start: 2020-10-09 | End: 2020-10-09 | Stop reason: HOSPADM

## 2020-10-09 RX ORDER — ACETAMINOPHEN 325 MG/1
650 TABLET ORAL EVERY 4 HOURS PRN
Status: DISCONTINUED | OUTPATIENT
Start: 2020-10-09 | End: 2020-10-12 | Stop reason: HOSPADM

## 2020-10-09 RX ORDER — BISACODYL 10 MG
10 SUPPOSITORY, RECTAL RECTAL DAILY PRN
Status: DISCONTINUED | OUTPATIENT
Start: 2020-10-09 | End: 2020-10-12 | Stop reason: HOSPADM

## 2020-10-09 RX ORDER — FENTANYL CITRATE 50 UG/ML
INJECTION, SOLUTION INTRAMUSCULAR; INTRAVENOUS AS NEEDED
Status: DISCONTINUED | OUTPATIENT
Start: 2020-10-09 | End: 2020-10-09 | Stop reason: HOSPADM

## 2020-10-09 RX ORDER — AMOXICILLIN 250 MG
2 CAPSULE ORAL 2 TIMES DAILY PRN
Status: DISCONTINUED | OUTPATIENT
Start: 2020-10-09 | End: 2020-10-12 | Stop reason: HOSPADM

## 2020-10-09 RX ORDER — POLYETHYLENE GLYCOL 3350 17 G/17G
17 POWDER, FOR SOLUTION ORAL DAILY
Status: DISCONTINUED | OUTPATIENT
Start: 2020-10-10 | End: 2020-10-12 | Stop reason: HOSPADM

## 2020-10-09 RX ADMIN — RIVAROXABAN 20 MG: 20 TABLET, FILM COATED ORAL at 17:17

## 2020-10-09 RX ADMIN — BISACODYL 10 MG: 10 SUPPOSITORY RECTAL at 21:38

## 2020-10-09 RX ADMIN — MAGNESIUM HYDROXIDE 10 ML: 2400 SUSPENSION ORAL at 17:17

## 2020-10-09 RX ADMIN — INSULIN DETEMIR 15 UNITS: 100 INJECTION, SOLUTION SUBCUTANEOUS at 20:47

## 2020-10-09 RX ADMIN — FUROSEMIDE 20 MG: 20 TABLET ORAL at 17:17

## 2020-10-09 RX ADMIN — CEFTRIAXONE SODIUM 1 G: 1 INJECTION, POWDER, FOR SOLUTION INTRAMUSCULAR; INTRAVENOUS at 06:00

## 2020-10-09 RX ADMIN — SODIUM CHLORIDE, PRESERVATIVE FREE 10 ML: 5 INJECTION INTRAVENOUS at 20:47

## 2020-10-09 RX ADMIN — INSULIN LISPRO 5 UNITS: 100 INJECTION, SOLUTION INTRAVENOUS; SUBCUTANEOUS at 17:17

## 2020-10-09 RX ADMIN — INSULIN LISPRO 5 UNITS: 100 INJECTION, SOLUTION INTRAVENOUS; SUBCUTANEOUS at 17:18

## 2020-10-09 NOTE — PROGRESS NOTES
Adult Nutrition  Assessment/PES    Patient Name:  Ivory Carr  YOB: 1945  MRN: [id number]  Admit Date:  10/6/2020    Assessment Date:  10/9/2020        Reason for Assessment     Row Name 10/09/20 1633          Reason for Assessment    Reason For Assessment  follow-up protocol   20 mins     Diagnosis  -- per MD notes this adm                   Nutrition Prescription Ordered     Row Name 10/09/20 1633          Nutrition Prescription PO    Common Modifiers  Consistent Carbohydrate         Evaluation of Received Nutrient/Fluid Intake     Row Name 10/09/20 1633          PO Evaluation    Number of Meals  1 on 10/6, and then no po intake data avail since then.  RD attempted to obtain info from pt, but pt not in room at time of RD visit today (3:00p) and no family in pt's room to give further info     % PO Intake  100               Problem/Interventions:  Problem 1     Row Name 10/09/20 1635          Nutrition Diagnoses Problem 1    Problem 1  -- unable to determine  this visit, d/t lack of po intake data               Intervention Goal     Row Name 10/09/20 1635          Intervention Goal    PO  Establish PO         Nutrition Intervention     Row Name 10/09/20 1635          Nutrition Intervention    RD/Tech Action  Follow Tx progress           Education/Evaluation     Row Name 10/09/20 1635          Monitor/Evaluation    Monitor  Per protocol           Electronically signed by:  Maude Nobles MS,RD,LD  10/09/20 16:36 EDT

## 2020-10-09 NOTE — PROGRESS NOTES
[facility] Medicine Services  PROGRESS NOTE    Patient Name: Ivory Carr  : 1945  MRN: [id number]    Date of Admission: 10/6/2020  Primary Care Physician: Christiane Peter APRN    Subjective   Subjective     CC:  Follow-up ascites    HPI:  Patient finally returned from [facility] post cath.  Says she has some abdominal distention/discomfort, has not had a bowel movement 3 days, asking about laxative.  Tolerated procedure well, awaiting results, says she believes the cardiologist that he would come doctor tonight    Review of Systems  Gen- No fevers, chills  CV- No chest pain, palpitations  Resp- No cough, dyspnea  GI- No N/V/D, abd pain    Objective   Objective     Vital Signs:   Temp:  [97.3 °F (36.3 °C)-98.1 °F (36.7 °C)] 98.1 °F (36.7 °C)  Heart Rate:  [62-97] 93  Resp:  [17-18] 18  BP: (126-159)/() 141/80        Physical Exam:  Constitutional: No acute distress, awake, alert, sitting up on edge of bed  HENT: NCAT, mucous membranes moist  Respiratory: Clear to auscultation bilaterally, respiratory effort normal   Cardiovascular: IR IR, regular rate, palpable radial pulses bilaterally  Gastrointestinal: Positive bowel sounds, soft, nontender, nondistended  Musculoskeletal: No bilateral ankle edema  Psychiatric: Appropriate affect, cooperative  Neurologic: Speech clear    Results Reviewed:  Results from last 7 days   Lab Units 10/08/20  0959 10/06/20  0052   WBC 10*3/mm3 5.82 7.77   HEMOGLOBIN g/dL 9.5* 10.1*   HEMATOCRIT % 30.0* 31.2*   PLATELETS 10*3/mm3 269 342   INR   --  2.20*     Results from last 7 days   Lab Units 10/09/20  0844 10/08/20  0959 10/06/20  0052   SODIUM mmol/L 131* 126* 120*   POTASSIUM mmol/L 4.6 4.9 4.6   CHLORIDE mmol/L 93* 88* 83*   CO2 mmol/L 29.0 29.0 27.0   BUN mg/dL 21 23 29*   CREATININE mg/dL 1.02* 1.24* 1.18*   GLUCOSE mg/dL 118* 169* 217*   CALCIUM mg/dL 9.3 8.9 8.8   ALT (SGPT) U/L  --   --  14   AST (SGOT) U/L  --   --  23   PROBNP pg/mL  --    --  2,518.0*     Estimated Creatinine Clearance: 54.1 mL/min (A) (by C-G formula based on SCr of 1.02 mg/dL (H)).    Microbiology Results Abnormal     Procedure Component Value - Date/Time    Body Fluid Culture - Body Fluid, Peritoneum [[id number]] Collected: 10/07/20 1436    Lab Status: Preliminary result Specimen: Body Fluid from Peritoneum Updated: 10/09/20 1040     Body Fluid Culture No growth at 2 days     Gram Stain Few (2+) WBCs seen      No organisms seen    Urine Culture - Urine, Urine, Clean Catch [[id number]]  (Abnormal)  (Susceptibility) Collected: 10/06/20 0056    Lab Status: Final result Specimen: Urine, Clean Catch Updated: 10/08/20 0136     Urine Culture >100,000 CFU/mL Escherichia coli    Susceptibility      Escherichia coli     YISSEL     Amikacin Susceptible     Ampicillin Resistant     Ampicillin + Sulbactam Intermediate     Cefazolin Susceptible     Cefepime Susceptible     Ceftazidime Susceptible     Ceftriaxone Susceptible     Gentamicin Resistant     Levofloxacin Susceptible     Nitrofurantoin Susceptible     Piperacillin + Tazobactam Susceptible     Tetracycline Resistant     Tobramycin Intermediate     Trimethoprim + Sulfamethoxazole Resistant                    COVID PRE-OP / PRE-PROCEDURE SCREENING ORDER (NO ISOLATION) - Swab, Nasopharynx [[id number]]  (Normal) Collected: 10/06/20 0420    Lab Status: Final result Specimen: Swab from Nasopharynx Updated: 10/06/20 0606    Narrative:      The following orders were created for panel order COVID PRE-OP / PRE-PROCEDURE SCREENING ORDER (NO ISOLATION) - Swab, Nasopharynx.  Procedure                               Abnormality         Status                     ---------                               -----------         ------                     COVID-19,CEPHEID,FANTA IN-...[[id number]]  Normal              Final result                 Please view results for these tests on the individual orders.    COVID-19,CEPHEID,FANTA IN-HOUSE(OR EMERGENT/ADD-ON),NP SWAB  IN TRANSPORT MEDIA 3-4 HR TAT - Swab, Nasopharynx [[id number]]  (Normal) Collected: 10/06/20 0420    Lab Status: Final result Specimen: Swab from Nasopharynx Updated: 10/06/20 0606     COVID19 Not Detected    Narrative:      Fact sheet for providers: https://www.fda.gov/media/237693/download     Fact sheet for patients: https://www.fda.gov/media/854176/download          Imaging Results (Last 24 Hours)     ** No results found for the last 24 hours. **               I have reviewed the medications:  Scheduled Meds:aspirin, 81 mg, Oral, Daily  cefTRIAXone, 1 g, Intravenous, Q24H  furosemide, 20 mg, Oral, BID  insulin detemir, 15 Units, Subcutaneous, Q12H  insulin lispro, 0-14 Units, Subcutaneous, TID AC  insulin lispro, 5 Units, Subcutaneous, TID With Meals  rivaroxaban, 20 mg, Oral, Daily With Dinner  sodium chloride, 10 mL, Intravenous, Q12H      Continuous Infusions:   PRN Meds:.•  acetaminophen  •  acetaminophen  •  dextrose  •  dextrose  •  glucagon (human recombinant)  •  melatonin  •  sodium chloride  •  [COMPLETED] Insert peripheral IV **AND** sodium chloride  •  sodium chloride    Assessment/Plan   Assessment & Plan     Active Hospital Problems    Diagnosis  POA   • **Hyponatremia [E87.1]  Yes   • Severe tricuspid regurgitation [I07.1]  Yes   • DM2 (diabetes mellitus, type 2) (CMS/HCC) [E11.9]  Yes   • Anasarca and ascites [R60.1]  Yes   • Elevated serum creatinine [R79.89]  Yes   • Acute UTI (urinary tract infection) [N39.0]  Yes   • Anemia [D64.9]  Yes   • Acute UTI [N39.0]  Yes   • CKD (chronic kidney disease) [N18.9]  Yes   • History of repair of congenital atrial septal defect (ASD) [Z87.74]  Unknown   • Atrial flutter (CMS/[facility]) [I48.92]  Yes      Resolved Hospital Problems   No resolved problems to display.        Brief Hospital Course to date:  Ivory Carr is a 75 y.o. female with history of DM 2, CKD 3, A. fib/flutter s/p cardioversion x2 who has had plans for ablation with Dr. Garcia on 10/15;  recent cholecystectomy; who presented with generalized weakness abdominal distention found to have new ascites underwent work-up with labs consistent with cardiac ascites, echocardiography suggestive of severe tricuspid regurgitation, now s/p right heart catheterization with results pending    The following problems new to me today    Assessment/plan    New onset ascites, consistent with cardiac ascites  Severe tricuspid regurgitation  -Post fluid studies, seen by GI, consider less likely cardiac ascites  -Post right heart cath with results pending this afternoon  -No evidence for chronic liver disease  -Await cardiology recommendations    Constipation  -Milk of magnesia  -Add PRN's    Chronic atrial flutter  - Rate controlled, continue Xarelto  -Plan for ablation 10/15/2020 with Dr. Garcia    Dyspnea without hypoxia  -Echo with severe TR is noted, EF normal, cardiology suspecting pulmonary hypertension, post RHC as noted  -Persistent A. fib/flutter could also create sensation of dyspnea    DM type 2, uncontrolled with hyperglycemia, A1c > 14%  -Continue basal insulin and SSI    E. coli UTI  -Received third dose of ceftriaxone today, will discontinue her doses    Hypervolemic hyponatremia  -Sodium slowly improving up to 131 today  -Continue to monitor    CKD stage 3  - Baseline CR approximately 1.0-1.3 MI: EGFR 40s  -Stable    DVT Prophylaxis: Xarelto    Disposition: I expect the patient to be discharged TBD, it appears there was some plan for cardioversion with EP on 10/15  Not sure if she was planned remain in the hospital discharge and return, will need to discuss with cardiology currently awaiting results of right heart cath; disposition will largely depend on cardiology recommendations, could feasibly leave as soon as 1-2 days or end up staying past 10/15.    CODE STATUS:   Code Status and Medical Interventions:   Ordered at: 10/06/20 0410     Level Of Support Discussed With:    Patient     Code Status:    CPR      Medical Interventions (Level of Support Prior to Arrest):    Full       Lg Motta, DO  10/09/20

## 2020-10-09 NOTE — PLAN OF CARE
Goal Outcome Evaluation:  Plan of Care Reviewed With: patient, spouse  Progress: no change    Patient arrived to floor from [facility] s/p heart cath, R femoral site covered with gauze and tegaderm old drainage on gauze. No c/o pain or discomfort since arriving to floor. Will continue to monitor

## 2020-10-10 LAB
ANION GAP SERPL CALCULATED.3IONS-SCNC: 11 MMOL/L (ref 5–15)
BACTERIA FLD CULT: NORMAL
BUN SERPL-MCNC: 21 MG/DL (ref 8–23)
BUN/CREAT SERPL: 19.4 (ref 7–25)
CALCIUM SPEC-SCNC: 9.2 MG/DL (ref 8.6–10.5)
CHLORIDE SERPL-SCNC: 90 MMOL/L (ref 98–107)
CO2 SERPL-SCNC: 26 MMOL/L (ref 22–29)
CREAT SERPL-MCNC: 1.08 MG/DL (ref 0.57–1)
DEPRECATED RDW RBC AUTO: 47 FL (ref 37–54)
ERYTHROCYTE [DISTWIDTH] IN BLOOD BY AUTOMATED COUNT: 15.3 % (ref 12.3–15.4)
GFR SERPL CREATININE-BSD FRML MDRD: 49 ML/MIN/1.73
GLUCOSE BLDC GLUCOMTR-MCNC: 140 MG/DL (ref 70–130)
GLUCOSE BLDC GLUCOMTR-MCNC: 146 MG/DL (ref 70–130)
GLUCOSE BLDC GLUCOMTR-MCNC: 218 MG/DL (ref 70–130)
GLUCOSE BLDC GLUCOMTR-MCNC: 249 MG/DL (ref 70–130)
GLUCOSE SERPL-MCNC: 209 MG/DL (ref 65–99)
GRAM STN SPEC: NORMAL
GRAM STN SPEC: NORMAL
HCT VFR BLD AUTO: 31 % (ref 34–46.6)
HGB BLD-MCNC: 9.6 G/DL (ref 12–15.9)
MCH RBC QN AUTO: 26.2 PG (ref 26.6–33)
MCHC RBC AUTO-ENTMCNC: 31 G/DL (ref 31.5–35.7)
MCV RBC AUTO: 84.7 FL (ref 79–97)
PLATELET # BLD AUTO: 298 10*3/MM3 (ref 140–450)
PMV BLD AUTO: 9.9 FL (ref 6–12)
POTASSIUM SERPL-SCNC: 4.5 MMOL/L (ref 3.5–5.2)
RBC # BLD AUTO: 3.66 10*6/MM3 (ref 3.77–5.28)
SODIUM SERPL-SCNC: 127 MMOL/L (ref 136–145)
WBC # BLD AUTO: 5.91 10*3/MM3 (ref 3.4–10.8)

## 2020-10-10 PROCEDURE — 99232 SBSQ HOSP IP/OBS MODERATE 35: CPT | Performed by: PHYSICIAN ASSISTANT

## 2020-10-10 PROCEDURE — 80048 BASIC METABOLIC PNL TOTAL CA: CPT | Performed by: INTERNAL MEDICINE

## 2020-10-10 PROCEDURE — 63710000001 INSULIN DETEMIR PER 5 UNITS: Performed by: INTERNAL MEDICINE

## 2020-10-10 PROCEDURE — 99232 SBSQ HOSP IP/OBS MODERATE 35: CPT | Performed by: INTERNAL MEDICINE

## 2020-10-10 PROCEDURE — 82962 GLUCOSE BLOOD TEST: CPT

## 2020-10-10 PROCEDURE — 85027 COMPLETE CBC AUTOMATED: CPT | Performed by: INTERNAL MEDICINE

## 2020-10-10 RX ADMIN — POLYETHYLENE GLYCOL 3350 17 G: 17 POWDER, FOR SOLUTION ORAL at 08:24

## 2020-10-10 RX ADMIN — DOCUSATE SODIUM 50MG AND SENNOSIDES 8.6MG 2 TABLET: 8.6; 5 TABLET, FILM COATED ORAL at 00:23

## 2020-10-10 RX ADMIN — INSULIN LISPRO 5 UNITS: 100 INJECTION, SOLUTION INTRAVENOUS; SUBCUTANEOUS at 08:24

## 2020-10-10 RX ADMIN — FUROSEMIDE 20 MG: 20 TABLET ORAL at 17:55

## 2020-10-10 RX ADMIN — INSULIN LISPRO 5 UNITS: 100 INJECTION, SOLUTION INTRAVENOUS; SUBCUTANEOUS at 17:55

## 2020-10-10 RX ADMIN — SODIUM CHLORIDE, PRESERVATIVE FREE 10 ML: 5 INJECTION INTRAVENOUS at 08:25

## 2020-10-10 RX ADMIN — INSULIN DETEMIR 15 UNITS: 100 INJECTION, SOLUTION SUBCUTANEOUS at 10:11

## 2020-10-10 RX ADMIN — FUROSEMIDE 20 MG: 20 TABLET ORAL at 08:23

## 2020-10-10 RX ADMIN — INSULIN DETEMIR 17 UNITS: 100 INJECTION, SOLUTION SUBCUTANEOUS at 21:44

## 2020-10-10 RX ADMIN — ASPIRIN 81 MG: 81 TABLET, COATED ORAL at 08:23

## 2020-10-10 RX ADMIN — RIVAROXABAN 20 MG: 20 TABLET, FILM COATED ORAL at 17:55

## 2020-10-10 RX ADMIN — INSULIN LISPRO 5 UNITS: 100 INJECTION, SOLUTION INTRAVENOUS; SUBCUTANEOUS at 12:53

## 2020-10-10 RX ADMIN — SODIUM CHLORIDE, PRESERVATIVE FREE 10 ML: 5 INJECTION INTRAVENOUS at 21:45

## 2020-10-10 RX ADMIN — INSULIN LISPRO 5 UNITS: 100 INJECTION, SOLUTION INTRAVENOUS; SUBCUTANEOUS at 08:25

## 2020-10-10 NOTE — PROGRESS NOTES
"[facility]  Hospital Progress Note     LOS: 4 days   Patient Care Team:  Christiane Peter APRN as PCP - General (Nurse Practitioner)  PCP:  Christiane Peter APRN    Chief Complaint:  ascites    SUBJECTIVE:   Pt denies CP, dyspnea at rest. Will get dyspneic w any movement, lower abdomen is tight.       OBJECTIVE:    Vital Sign Min/Max for last 24 hours  Temp  Min: 97.7 °F (36.5 °C)  Max: 98.1 °F (36.7 °C)   BP  Min: 126/69  Max: 159/101   Pulse  Min: 62  Max: 97   Resp  Min: 16  Max: 20   SpO2  Min: 94 %  Max: 99 %   No data recorded   Weight  Min: 85.4 kg (188 lb 3.2 oz)  Max: 85.4 kg (188 lb 3.2 oz)     Flowsheet Rows      First Filed Value   Admission Height  172.7 cm (68\") Documented at 10/05/2020 1914   Admission Weight  88.9 kg (196 lb) Documented at 10/05/2020 1914          Telemetry: afib      Intake/Output Summary (Last 24 hours) at 10/10/2020 0836  Last data filed at 10/10/2020 0334  Gross per 24 hour   Intake 236 ml   Output --   Net 236 ml     Intake & Output (last 3 days)       10/07 0701 - 10/08 0700 10/08 0701 - 10/09 0700 10/09 0701 - 10/10 0700 10/10 0701 - 10/11 0700    P.O.   236     IV Piggyback 100       Total Intake(mL/kg) 100 (1.2)  236 (2.8)     Urine (mL/kg/hr) 1400 (0.7) 1300 (0.6) 500 (0.2)     Total Output 1400 1300 500     Net -1300 -1300 -264             Urine Unmeasured Occurrence 3 x              Physical Exam:  Vitals signs and nursing note reviewed.   Constitutional:       General: Not in acute distress.     Appearance: Well-developed and not in distress.   Neck:      Vascular: No carotid bruit.   Pulmonary:      Effort: Pulmonary effort is normal.      Breath sounds: Normal breath sounds. No wheezing. No rales.   Cardiovascular:      Normal rate. Irregularly irregular rhythm. Normal S1. Normal S2.      Comments: R groin dressing removed, site initially cdi w dried blood, but then started oozing slowly.   Pulses:     Dorsalis pedis: 2+ bilaterally.  Edema:    "  Peripheral edema absent.   Abdominal:      General: Bowel sounds are normal.      Palpations: Abdomen is soft.      Tenderness: There is no abdominal tenderness. There is no guarding.   Musculoskeletal:         General: No tenderness.   Skin:     General: Skin is warm and dry.      Findings: No rash.   Neurological:      Mental Status: Alert and oriented to person, place, and time.          LABS/DIAGNOSTIC DATA:  Results from last 7 days   Lab Units 10/08/20  0959 10/06/20  0052   WBC 10*3/mm3 5.82 7.77   HEMOGLOBIN g/dL 9.5* 10.1*   HEMATOCRIT % 30.0* 31.2*   PLATELETS 10*3/mm3 269 342     No results found for: TROPONINT  Results from last 7 days   Lab Units 10/06/20  0052   INR  2.20*   APTT seconds 43.4*     Results from last 7 days   Lab Units 10/09/20  0844 10/08/20  0959 10/06/20  0052   SODIUM mmol/L 131* 126* 120*   POTASSIUM mmol/L 4.6 4.9 4.6   CHLORIDE mmol/L 93* 88* 83*   CO2 mmol/L 29.0 29.0 27.0   BUN mg/dL 21 23 29*   CREATININE mg/dL 1.02* 1.24* 1.18*   CALCIUM mg/dL 9.3 8.9 8.8   BILIRUBIN mg/dL  --   --  0.3   ALK PHOS U/L  --   --  99   ALT (SGPT) U/L  --   --  14   AST (SGOT) U/L  --   --  23   GLUCOSE mg/dL 118* 169* 217*             Results from last 7 days   Lab Units 10/08/20  0959   TSH uIU/mL 2.110           Medication Review:   aspirin, 81 mg, Oral, Daily  furosemide, 20 mg, Oral, BID  insulin detemir, 15 Units, Subcutaneous, Q12H  insulin lispro, 0-14 Units, Subcutaneous, TID AC  insulin lispro, 5 Units, Subcutaneous, TID With Meals  polyethylene glycol, 17 g, Oral, Daily  rivaroxaban, 20 mg, Oral, Daily With Dinner  sodium chloride, 10 mL, Intravenous, Q12H               Hyponatremia    Atrial flutter (CMS/HCC)    DM2 (diabetes mellitus, type 2) (CMS/HCC)    Anasarca and ascites    Elevated serum creatinine    Acute UTI (urinary tract infection)    Anemia    Acute UTI    CKD (chronic kidney disease)    Severe tricuspid regurgitation    History of repair of congenital atrial septal  defect (ASD)      ASSESSMENT/PLAN:  Severe TR  -  Echo 10/6/2020 see above- likely she has PA HTN underestimated by echo with severe TR from late closure of ASD  -RHC 10/9/2020 with mild pulmonary HTN, pulmonary vascular resistance is normal, elevated mean RA pressure that appears to be related to the severe tricuspid regurgitation, pulmonary capillary wedge pressure is elevated        Persistent Atrial Flutter  -  Remote ECV x 3 with most recent March 2020.  -  Rates currently controlled and patient with minimal symptoms  -  On Xarelto for stroke prevention.  -  plan for RFA of Aflutter per Dr. Garcia next week      Anasarca/Ascities  -  Presents with severe abdominal distension and discomfort.   -  CT Abd/Pelvis reveals anasarca and ascites.    -  Minimal to no ascites at time of CT guided paracentesis (50cc)   - fluid studies consistent w cardiac ascites  - GI following     Hyponatremia  - per hospitalists     PLAN:  -reapply pressure dressing   -Continue diuresis  -Plan for RFA of a flutter next week      Electronically signed by Jacey Mahoney PA-C, 10/10/20, 8:21 AM EDT.

## 2020-10-10 NOTE — PLAN OF CARE
Goal Outcome Evaluation:  Plan of Care Reviewed With: patient, spouse  Progress: no change  Outcome Summary: VSS. A-fib/A-flutter on the monitor. Room air. No complaints of pain noted. Patient complaining of being constipated x3 days. PRN pericolace and bisacodyl given x1. Right groin site remains clean, dry, intact, and soft. Will continue to monitor.

## 2020-10-10 NOTE — PROGRESS NOTES
[facility] Medicine Services  PROGRESS NOTE    Patient Name: Ivory Carr  : 1945  MRN: [id number]    Date of Admission: 10/6/2020  Primary Care Physician: Christiane Peter APRN    Subjective   Subjective     CC:  Ascites, Afib    HPI:  Still with significant abd discomfort, but had BM this morning. Discussed plan to stay until Tuesday for ablation.     Review of Systems  Gen- No fevers, chills  CV- No chest pain, palpitations  Resp- No cough, dyspnea  GI- +abd distension, + BM, no nausea      Objective   Objective     Vital Signs:   Temp:  [97.7 °F (36.5 °C)-98.2 °F (36.8 °C)] 98.2 °F (36.8 °C)  Heart Rate:  [62-97] 86  Resp:  [16-20] 16  BP: (126-149)/() 149/69        Physical Exam:  Constitutional: No acute distress, awake, alert,  at bedside  HENT: NCAT, mucous membranes moist  Respiratory: Clear to auscultation bilaterally, respiratory effort normal   Cardiovascular: IRRR, no murmurs  Gastrointestinal: Positive bowel sounds, soft, nontender, distended  Musculoskeletal: No bilateral ankle edema  Psychiatric: Appropriate affect, cooperative  Neurologic: Oriented x 3, speech clear  Skin: No rashes    Results Reviewed:  Results from last 7 days   Lab Units 10/08/20  0959 10/06/20  0052   WBC 10*3/mm3 5.82 7.77   HEMOGLOBIN g/dL 9.5* 10.1*   HEMATOCRIT % 30.0* 31.2*   PLATELETS 10*3/mm3 269 342   INR   --  2.20*     Results from last 7 days   Lab Units 10/09/20  0844 10/08/20  0959 10/06/20  0052   SODIUM mmol/L 131* 126* 120*   POTASSIUM mmol/L 4.6 4.9 4.6   CHLORIDE mmol/L 93* 88* 83*   CO2 mmol/L 29.0 29.0 27.0   BUN mg/dL 21 23 29*   CREATININE mg/dL 1.02* 1.24* 1.18*   GLUCOSE mg/dL 118* 169* 217*   CALCIUM mg/dL 9.3 8.9 8.8   ALT (SGPT) U/L  --   --  14   AST (SGOT) U/L  --   --  23   PROBNP pg/mL  --   --  2,518.0*     Estimated Creatinine Clearance: 52.9 mL/min (A) (by C-G formula based on SCr of 1.02 mg/dL (H)).    Microbiology Results Abnormal     Procedure  Component Value - Date/Time    Body Fluid Culture - Body Fluid, Peritoneum [[id number]] Collected: 10/07/20 1436    Lab Status: Final result Specimen: Body Fluid from Peritoneum Updated: 10/10/20 1102     Body Fluid Culture No growth at 3 days     Gram Stain Few (2+) WBCs seen      No organisms seen    Urine Culture - Urine, Urine, Clean Catch [[id number]]  (Abnormal)  (Susceptibility) Collected: 10/06/20 0056    Lab Status: Final result Specimen: Urine, Clean Catch Updated: 10/08/20 0136     Urine Culture >100,000 CFU/mL Escherichia coli    Susceptibility      Escherichia coli     YISSEL     Amikacin Susceptible     Ampicillin Resistant     Ampicillin + Sulbactam Intermediate     Cefazolin Susceptible     Cefepime Susceptible     Ceftazidime Susceptible     Ceftriaxone Susceptible     Gentamicin Resistant     Levofloxacin Susceptible     Nitrofurantoin Susceptible     Piperacillin + Tazobactam Susceptible     Tetracycline Resistant     Tobramycin Intermediate     Trimethoprim + Sulfamethoxazole Resistant                    COVID PRE-OP / PRE-PROCEDURE SCREENING ORDER (NO ISOLATION) - Swab, Nasopharynx [[id number]]  (Normal) Collected: 10/06/20 0420    Lab Status: Final result Specimen: Swab from Nasopharynx Updated: 10/06/20 0606    Narrative:      The following orders were created for panel order COVID PRE-OP / PRE-PROCEDURE SCREENING ORDER (NO ISOLATION) - Swab, Nasopharynx.  Procedure                               Abnormality         Status                     ---------                               -----------         ------                     COVID-19,CEPHEID,FANTA IN-...[[id number]]  Normal              Final result                 Please view results for these tests on the individual orders.    COVID-19,CEPHEID,FANTA IN-HOUSE(OR EMERGENT/ADD-ON),NP SWAB IN TRANSPORT MEDIA 3-4 HR TAT - Swab, Nasopharynx [[id number]]  (Normal) Collected: 10/06/20 0420    Lab Status: Final result Specimen: Swab from Nasopharynx Updated:  "10/06/20 0606     COVID19 Not Detected    Narrative:      Fact sheet for providers: https://www.fda.gov/media/767153/download     Fact sheet for patients: https://www.fda.gov/media/259971/download          Imaging Results (Last 24 Hours)     ** No results found for the last 24 hours. **          Results for orders placed during the hospital encounter of 10/06/20   Transthoracic Echo Complete With Contrast if Necessary Per Protocol    Addendum · The right heart is enlarged. Left heart chamber sizes are normal. · There is severe tricuspid regurgitation. Septal motion (ventricular)  suggests an RV volume overload. · The atrial septum is abnormal. A septal \"patch\" is suggested. There is  no evidence of shunting by agitated saline study. · The estimated PA pressure is normal to slightly elevated. The estimated  RA pressure is elevated. · There is moderate concentric left ventricular hypertrophy. · Global and segmental LV wall motion is normal with an estimated LV  ejection fraction of 56%-60%. Estimated RV systolic function is normal. · There are fibrocalcific changes in the aortic valve. The valve is  functionally normal. · Calcium is noted in the mitral annulus and leaflets. Mild mitral  regurgitation is present. · There are no other important findings on this study.        Irvin Mccray MD 10/9/2020  7:37 PM     · The right heart is  enlarged. Left heart chamber sizes are normal. · There is severe tricuspid regurgitation. Septal motion (ventricular)  suggests an RV volume overload. · The atrial septum is abnormal. A septal \"patch\" is suggested. There is  no evidence of shunting by agitated saline study. · The estimated PA pressure is normal to slightly elevated. The estimated  RA pressure is elevated. · There is moderate concentric left ventricular hypertrophy. · Global and segmental LV wall motion is normal with an estimated LV  ejection fraction of 56%-60%. Estimated RV systolic function is normal. · There are " "fibrocalcific changes in the aortic valve. The valve is  functionally normal. · Calcium is noted in the mitral annulus and leaflets. Mild mitral  regurgitation is present. · There are no other important findings on this study. · Left ventricular ejection fraction appears to be 56 - 60%. · Left ventricular wall thickness is consistent with concentric  hypertrophy.        Irvin Mccray MD 10/6/2020  6:07 PM          Narrative · The right heart is enlarged. Left heart chamber sizes are normal.  · There is severe tricuspid regurgitation. Septal motion (ventricular)   suggests an RV volume overload.  · The atrial septum is abnormal. A septal \"patch\" is suggested. There is   no evidence of shunting by agitated saline study.  · The estimated PA pressure is normal to slightly elevated. The estimated   RA pressure is elevated.  · There is moderate concentric left ventricular hypertrophy.  · Global and segmental LV wall motion is normal with an estimated LV   ejection fraction of 56%-60%.  · There are fibrocalcific changes in the aortic valve. The valve is   functionally normal.  · Calcium is noted in the mitral annulus and leaflets. Mild mitral   regurgitation is present.  · There are no other important findings on this study.          I have reviewed the medications:  Scheduled Meds:aspirin, 81 mg, Oral, Daily  furosemide, 20 mg, Oral, BID  insulin detemir, 15 Units, Subcutaneous, Q12H  insulin lispro, 0-14 Units, Subcutaneous, TID AC  insulin lispro, 5 Units, Subcutaneous, TID With Meals  polyethylene glycol, 17 g, Oral, Daily  rivaroxaban, 20 mg, Oral, Daily With Dinner  sodium chloride, 10 mL, Intravenous, Q12H      Continuous Infusions:   PRN Meds:.•  acetaminophen  •  acetaminophen  •  bisacodyl  •  dextrose  •  dextrose  •  glucagon (human recombinant)  •  melatonin  •  polyethylene glycol  •  senna-docusate sodium  •  sodium chloride  •  [COMPLETED] Insert peripheral IV **AND** sodium chloride  •  sodium " chloride    Assessment/Plan   Assessment & Plan     Active Hospital Problems    Diagnosis  POA   • **Hyponatremia [E87.1]  Yes   • Severe tricuspid regurgitation [I07.1]  Yes   • DM2 (diabetes mellitus, type 2) (CMS/HCC) [E11.9]  Yes   • Anasarca and ascites [R60.1]  Yes   • Elevated serum creatinine [R79.89]  Yes   • Acute UTI (urinary tract infection) [N39.0]  Yes   • Anemia [D64.9]  Yes   • Acute UTI [N39.0]  Yes   • CKD (chronic kidney disease) [N18.9]  Yes   • History of repair of congenital atrial septal defect (ASD) [Z87.74]  Unknown   • Atrial flutter (CMS/HCC) [I48.92]  Yes      Resolved Hospital Problems   No resolved problems to display.        Brief Hospital Course to date:  Ivory Carr is a 75 y.o. female with history of DM 2, CKD 3, A. fib/flutter s/p cardioversion x2 who has had plans for ablation with Dr. Garcia on 10/15; recent cholecystectomy; who presented with generalized weakness abdominal distention found to have new ascites underwent work-up with labs consistent with cardiac ascites, echocardiography suggestive of severe tricuspid regurgitation, now s/p right heart catheterization with mild pulmonary artery hypertension and elevated mean right atrial pressures.      New onset ascites, consistent with cardiac ascites  Severe tricuspid regurgitation  -Post fluid studies, seen by GI, consider less likely cardiac ascites  -Post right heart cath with results pending this afternoon  -No evidence for chronic liver disease  -plan for RFA as scheduled next week per cardiology     Constipation  -Milk of magnesia  -Add PRN's  - would consider NG to help with some decompression but surgery has been consulted by cardiology so will await their recommendations    Chronic atrial flutter  - Rate controlled, continue Xarelto  -Plan for ablation 10/15/2020 with Dr. Garcia     Dyspnea without hypoxia  -Echo with severe TR is noted, EF normal, cardiology suspecting pulmonary hypertension, post RHC as  noted  -Persistent A. fib/flutter could also create sensation of dyspnea     DM type 2, uncontrolled with hyperglycemia, A1c > 14%  -Continue basal insulin and SSI     E. coli UTI  -s/p 3 doses of rocephin     Hypervolemic hyponatremia  -Sodium slowly improving up to 131, labs for today not yet drawn  -Continue to monitor     CKD stage 3  - Baseline CR approximately 1.0-1.3 MI: EGFR 40s  -Stable     DVT Prophylaxis: Xarelto      Disposition: I expect the patient to be discharged pending cardiology workup    CODE STATUS:   Code Status and Medical Interventions:   Ordered at: 10/06/20 0410     Level Of Support Discussed With:    Patient     Code Status:    CPR     Medical Interventions (Level of Support Prior to Arrest):    Full       Soraya Kay, DO  10/10/20

## 2020-10-10 NOTE — PROGRESS NOTES
See my catheterization note from this date.    I spoke with Mark Brunner.    Her right heart cath is abnormal (see report); however, I cannot be certain that findings will fully explain her current abdominal findings.     For now:    - Diuresis  - Go ahead with RFA of atrial flutter as scheduled next week  -  I consulted [facility] re GB issues   -  Dr. Nash will review all data to make sure that we have not missed anything.     All discussed with the patient and her ; questions answered.

## 2020-10-11 ENCOUNTER — APPOINTMENT (OUTPATIENT)
Dept: GENERAL RADIOLOGY | Facility: HOSPITAL | Age: 75
End: 2020-10-11

## 2020-10-11 LAB
ALBUMIN SERPL-MCNC: 3.1 G/DL (ref 3.5–5.2)
ANION GAP SERPL CALCULATED.3IONS-SCNC: 11 MMOL/L (ref 5–15)
BUN SERPL-MCNC: 21 MG/DL (ref 8–23)
BUN/CREAT SERPL: 20 (ref 7–25)
CALCIUM SPEC-SCNC: 8.9 MG/DL (ref 8.6–10.5)
CHLORIDE SERPL-SCNC: 91 MMOL/L (ref 98–107)
CO2 SERPL-SCNC: 27 MMOL/L (ref 22–29)
CREAT SERPL-MCNC: 1.05 MG/DL (ref 0.57–1)
GFR SERPL CREATININE-BSD FRML MDRD: 51 ML/MIN/1.73
GLUCOSE BLDC GLUCOMTR-MCNC: 107 MG/DL (ref 70–130)
GLUCOSE BLDC GLUCOMTR-MCNC: 154 MG/DL (ref 70–130)
GLUCOSE BLDC GLUCOMTR-MCNC: 209 MG/DL (ref 70–130)
GLUCOSE BLDC GLUCOMTR-MCNC: 98 MG/DL (ref 70–130)
GLUCOSE SERPL-MCNC: 125 MG/DL (ref 65–99)
PHOSPHATE SERPL-MCNC: 3.7 MG/DL (ref 2.5–4.5)
POTASSIUM SERPL-SCNC: 4.1 MMOL/L (ref 3.5–5.2)
SODIUM SERPL-SCNC: 129 MMOL/L (ref 136–145)

## 2020-10-11 PROCEDURE — 63710000001 INSULIN DETEMIR PER 5 UNITS: Performed by: INTERNAL MEDICINE

## 2020-10-11 PROCEDURE — 74018 RADEX ABDOMEN 1 VIEW: CPT

## 2020-10-11 PROCEDURE — 80069 RENAL FUNCTION PANEL: CPT | Performed by: INTERNAL MEDICINE

## 2020-10-11 PROCEDURE — 82962 GLUCOSE BLOOD TEST: CPT

## 2020-10-11 PROCEDURE — 99232 SBSQ HOSP IP/OBS MODERATE 35: CPT | Performed by: INTERNAL MEDICINE

## 2020-10-11 PROCEDURE — 99231 SBSQ HOSP IP/OBS SF/LOW 25: CPT | Performed by: PHYSICIAN ASSISTANT

## 2020-10-11 RX ADMIN — INSULIN LISPRO 5 UNITS: 100 INJECTION, SOLUTION INTRAVENOUS; SUBCUTANEOUS at 12:04

## 2020-10-11 RX ADMIN — FUROSEMIDE 20 MG: 20 TABLET ORAL at 18:09

## 2020-10-11 RX ADMIN — INSULIN DETEMIR 17 UNITS: 100 INJECTION, SOLUTION SUBCUTANEOUS at 20:44

## 2020-10-11 RX ADMIN — INSULIN LISPRO 3 UNITS: 100 INJECTION, SOLUTION INTRAVENOUS; SUBCUTANEOUS at 12:04

## 2020-10-11 RX ADMIN — MAGNESIUM HYDROXIDE 10 ML: 2400 SUSPENSION ORAL at 12:04

## 2020-10-11 RX ADMIN — ACETAMINOPHEN 650 MG: 325 TABLET, FILM COATED ORAL at 02:13

## 2020-10-11 RX ADMIN — ASPIRIN 81 MG: 81 TABLET, COATED ORAL at 08:38

## 2020-10-11 RX ADMIN — BISACODYL 10 MG: 10 SUPPOSITORY RECTAL at 12:04

## 2020-10-11 RX ADMIN — RIVAROXABAN 20 MG: 20 TABLET, FILM COATED ORAL at 18:09

## 2020-10-11 RX ADMIN — ACETAMINOPHEN 650 MG: 325 TABLET, FILM COATED ORAL at 20:43

## 2020-10-11 RX ADMIN — INSULIN LISPRO 5 UNITS: 100 INJECTION, SOLUTION INTRAVENOUS; SUBCUTANEOUS at 18:09

## 2020-10-11 RX ADMIN — POLYETHYLENE GLYCOL 3350 17 G: 17 POWDER, FOR SOLUTION ORAL at 08:38

## 2020-10-11 RX ADMIN — INSULIN LISPRO 5 UNITS: 100 INJECTION, SOLUTION INTRAVENOUS; SUBCUTANEOUS at 08:38

## 2020-10-11 RX ADMIN — SODIUM CHLORIDE, PRESERVATIVE FREE 10 ML: 5 INJECTION INTRAVENOUS at 08:36

## 2020-10-11 RX ADMIN — INSULIN DETEMIR 17 UNITS: 100 INJECTION, SOLUTION SUBCUTANEOUS at 08:40

## 2020-10-11 RX ADMIN — FUROSEMIDE 20 MG: 20 TABLET ORAL at 08:38

## 2020-10-11 NOTE — PROGRESS NOTES
"[facility] Cardiology  Hospital Progress Note     LOS: 5 days   Patient Care Team:  Christiane Peter APRN as PCP - General (Nurse Practitioner)  PCP:  Christiane Peter APRN    Chief Complaint:  ascites    SUBJECTIVE:   No complaints at rest. HR picks up w minimal activity, dyspneic w activity. Swelling is improved. Denies CP.   at bedside.      OBJECTIVE:    Vital Sign Min/Max for last 24 hours  Temp  Min: 97.9 °F (36.6 °C)  Max: 98.6 °F (37 °C)   BP  Min: 135/76  Max: 154/94   Pulse  Min: 74  Max: 129   Resp  Min: 16  Max: 18   No data recorded   No data recorded   Weight  Min: 85.3 kg (188 lb 1.6 oz)  Max: 85.3 kg (188 lb 1.6 oz)     Flowsheet Rows      First Filed Value   Admission Height  172.7 cm (68\") Documented at 10/05/2020 1914   Admission Weight  88.9 kg (196 lb) Documented at 10/05/2020 1914          Telemetry: afib 80s-90s      Intake/Output Summary (Last 24 hours) at 10/11/2020 0713  Last data filed at 10/10/2020 1749  Gross per 24 hour   Intake 720 ml   Output --   Net 720 ml     Intake & Output (last 3 days)       10/08 0701 - 10/09 0700 10/09 0701 - 10/10 0700 10/10 0701 - 10/11 0700 10/11 0701 - 10/12 0700    P.O.  236 720     IV Piggyback        Total Intake(mL/kg)  236 (2.8) 720 (8.4)     Urine (mL/kg/hr) 1300 (0.6) 500 (0.2)      Total Output 1300 500      Net -1300 -264 +720             Urine Unmeasured Occurrence   1 x     Stool Unmeasured Occurrence   1 x            Physical Exam:  Vitals signs and nursing note reviewed.   Constitutional:       General: Not in acute distress.     Appearance: Well-developed and not in distress.   Neck:      Vascular: No carotid bruit.   Pulmonary:      Effort: Pulmonary effort is normal.      Breath sounds: Normal breath sounds. No wheezing. No rales.   Cardiovascular:      Normal rate. Irregularly irregular rhythm. Normal S1. Normal S2.      Comments: R groin cdi, bandaged  Pulses:     Dorsalis pedis: 2+ bilaterally.  Edema:     Peripheral " edema absent.   Abdominal:      General: Bowel sounds are normal.      Palpations: Abdomen is soft.      Tenderness: There is no abdominal tenderness. There is no guarding.   Musculoskeletal:         General: No tenderness.   Skin:     General: Skin is warm and dry.      Findings: No rash.   Neurological:      Mental Status: Alert and oriented to person, place, and time.          LABS/DIAGNOSTIC DATA:  Results from last 7 days   Lab Units 10/10/20  1237 10/08/20  0959 10/06/20  0052   WBC 10*3/mm3 5.91 5.82 7.77   HEMOGLOBIN g/dL 9.6* 9.5* 10.1*   HEMATOCRIT % 31.0* 30.0* 31.2*   PLATELETS 10*3/mm3 298 269 342     No results found for: TROPONINT  Results from last 7 days   Lab Units 10/06/20  0052   INR  2.20*   APTT seconds 43.4*     Results from last 7 days   Lab Units 10/11/20  0526 10/10/20  1237 10/09/20  0844  10/06/20  0052   SODIUM mmol/L 129* 127* 131*   < > 120*   POTASSIUM mmol/L 4.1 4.5 4.6   < > 4.6   CHLORIDE mmol/L 91* 90* 93*   < > 83*   CO2 mmol/L 27.0 26.0 29.0   < > 27.0   BUN mg/dL 21 21 21   < > 29*   CREATININE mg/dL 1.05* 1.08* 1.02*   < > 1.18*   CALCIUM mg/dL 8.9 9.2 9.3   < > 8.8   BILIRUBIN mg/dL  --   --   --   --  0.3   ALK PHOS U/L  --   --   --   --  99   ALT (SGPT) U/L  --   --   --   --  14   AST (SGOT) U/L  --   --   --   --  23   GLUCOSE mg/dL 125* 209* 118*   < > 217*    < > = values in this interval not displayed.             Results from last 7 days   Lab Units 10/08/20  0959   TSH uIU/mL 2.110           Medication Review:   aspirin, 81 mg, Oral, Daily  furosemide, 20 mg, Oral, BID  insulin detemir, 17 Units, Subcutaneous, Q12H  insulin lispro, 0-14 Units, Subcutaneous, TID AC  insulin lispro, 5 Units, Subcutaneous, TID With Meals  polyethylene glycol, 17 g, Oral, Daily  rivaroxaban, 20 mg, Oral, Daily With Dinner  sodium chloride, 10 mL, Intravenous, Q12H               Hyponatremia    Atrial flutter (CMS/HCC)    DM2 (diabetes mellitus, type 2) (CMS/HCC)    Anasarca and  ascites    Elevated serum creatinine    Acute UTI (urinary tract infection)    Anemia    Acute UTI    CKD (chronic kidney disease)    Severe tricuspid regurgitation    History of repair of congenital atrial septal defect (ASD)      ASSESSMENT/PLAN:  Severe TR  -  Echo 10/6/2020 see above- likely she has PA HTN underestimated by echo with severe TR from late closure of ASD  -RHC 10/9/2020 with mild pulmonary HTN, pulmonary vascular resistance is normal, elevated mean RA pressure that appears to be related to the severe tricuspid regurgitation, pulmonary capillary wedge pressure is elevated        Persistent Atrial Flutter  -  Remote ECV x 3 with most recent March 2020.  -  Rates currently controlled and patient with minimal symptoms  -  On Xarelto for stroke prevention.  -  plan for RFA of Aflutter per Dr. Garcia next week      Anasarca/Ascities  -  Presents with severe abdominal distension and discomfort.   -  CT Abd/Pelvis reveals anasarca and ascites.    -  Minimal to no ascites at time of CT guided paracentesis (50cc)   - fluid studies consistent w cardiac ascites  - GI following     Hyponatremia  - per hospitalists     PLAN:  -Continue diuresis  -Plan for RFA of a flutter next week    Electronically signed by Jacey Mahoney PA-C, 10/11/20, 7:13 AM EDT.

## 2020-10-11 NOTE — PROGRESS NOTES
[facility] Medicine Services  PROGRESS NOTE    Patient Name: Ivory Carr  : 1945  MRN: [id number]    Date of Admission: 10/6/2020  Primary Care Physician: Christiane Peter APRN    Subjective   Subjective     CC:  A fib, abd distension    HPI:  Still with significant constipation.  No BM yet today    Review of Systems  Gen- No fevers, chills  CV- No chest pain, palpitations  Resp- No cough, dyspnea  GI- No N/V/D, abd pain, + distension but no pain      Objective   Objective     Vital Signs:   Temp:  [97.9 °F (36.6 °C)-98.6 °F (37 °C)] 97.9 °F (36.6 °C)  Heart Rate:  [] 102  Resp:  [16-18] 16  BP: (135-154)/(69-94) 150/74        Physical Exam:  Constitutional: No acute distress, awake, alert  HENT: NCAT, mucous membranes moist  Respiratory: Clear to auscultation bilaterally, respiratory effort normal   Cardiovascular: IRRR, no murmurs  Gastrointestinal: Positive bowel sounds, soft, nontender, distended  Musculoskeletal: trace edema  Psychiatric: Appropriate affect, cooperative  Neurologic: Oriented x 3, speech clear  Skin: No rashes    Results Reviewed:  Results from last 7 days   Lab Units 10/10/20  1237 10/08/20  0959 10/06/20  0052   WBC 10*3/mm3 5.91 5.82 7.77   HEMOGLOBIN g/dL 9.6* 9.5* 10.1*   HEMATOCRIT % 31.0* 30.0* 31.2*   PLATELETS 10*3/mm3 298 269 342   INR   --   --  2.20*     Results from last 7 days   Lab Units 10/11/20  0526 10/10/20  1237 10/09/20  0844  10/06/20  0052   SODIUM mmol/L 129* 127* 131*   < > 120*   POTASSIUM mmol/L 4.1 4.5 4.6   < > 4.6   CHLORIDE mmol/L 91* 90* 93*   < > 83*   CO2 mmol/L 27.0 26.0 29.0   < > 27.0   BUN mg/dL 21 21 21   < > 29*   CREATININE mg/dL 1.05* 1.08* 1.02*   < > 1.18*   GLUCOSE mg/dL 125* 209* 118*   < > 217*   CALCIUM mg/dL 8.9 9.2 9.3   < > 8.8   ALT (SGPT) U/L  --   --   --   --  14   AST (SGOT) U/L  --   --   --   --  23   PROBNP pg/mL  --   --   --   --  2,518.0*    < > = values in this interval not displayed.      Estimated Creatinine Clearance: 51.4 mL/min (A) (by C-G formula based on SCr of 1.05 mg/dL (H)).    Microbiology Results Abnormal     Procedure Component Value - Date/Time    Body Fluid Culture - Body Fluid, Peritoneum [[id number]] Collected: 10/07/20 1436    Lab Status: Final result Specimen: Body Fluid from Peritoneum Updated: 10/10/20 1102     Body Fluid Culture No growth at 3 days     Gram Stain Few (2+) WBCs seen      No organisms seen    Urine Culture - Urine, Urine, Clean Catch [[id number]]  (Abnormal)  (Susceptibility) Collected: 10/06/20 0056    Lab Status: Final result Specimen: Urine, Clean Catch Updated: 10/08/20 0136     Urine Culture >100,000 CFU/mL Escherichia coli    Susceptibility      Escherichia coli     YISSEL     Amikacin Susceptible     Ampicillin Resistant     Ampicillin + Sulbactam Intermediate     Cefazolin Susceptible     Cefepime Susceptible     Ceftazidime Susceptible     Ceftriaxone Susceptible     Gentamicin Resistant     Levofloxacin Susceptible     Nitrofurantoin Susceptible     Piperacillin + Tazobactam Susceptible     Tetracycline Resistant     Tobramycin Intermediate     Trimethoprim + Sulfamethoxazole Resistant                    COVID PRE-OP / PRE-PROCEDURE SCREENING ORDER (NO ISOLATION) - Swab, Nasopharynx [[id number]]  (Normal) Collected: 10/06/20 0420    Lab Status: Final result Specimen: Swab from Nasopharynx Updated: 10/06/20 0606    Narrative:      The following orders were created for panel order COVID PRE-OP / PRE-PROCEDURE SCREENING ORDER (NO ISOLATION) - Swab, Nasopharynx.  Procedure                               Abnormality         Status                     ---------                               -----------         ------                     COVID-19,CEPHEID,FANTA IN-...[[id number]]  Normal              Final result                 Please view results for these tests on the individual orders.    COVID-19,CEPHEID,FANTA IN-HOUSE(OR EMERGENT/ADD-ON),NP SWAB IN TRANSPORT MEDIA  "3-4 HR TAT - Swab, Nasopharynx [[id number]]  (Normal) Collected: 10/06/20 0420    Lab Status: Final result Specimen: Swab from Nasopharynx Updated: 10/06/20 0606     COVID19 Not Detected    Narrative:      Fact sheet for providers: https://www.fda.gov/media/927590/download     Fact sheet for patients: https://www.fda.gov/media/561469/download          Imaging Results (Last 24 Hours)     ** No results found for the last 24 hours. **          Results for orders placed during the hospital encounter of 10/06/20   Transthoracic Echo Complete With Contrast if Necessary Per Protocol    Addendum · The right heart is enlarged. Left heart chamber sizes are normal. · There is severe tricuspid regurgitation. Septal motion (ventricular)  suggests an RV volume overload. · The atrial septum is abnormal. A septal \"patch\" is suggested. There is  no evidence of shunting by agitated saline study. · The estimated PA pressure is normal to slightly elevated. The estimated  RA pressure is elevated. · There is moderate concentric left ventricular hypertrophy. · Global and segmental LV wall motion is normal with an estimated LV  ejection fraction of 56%-60%. Estimated RV systolic function is normal. · There are fibrocalcific changes in the aortic valve. The valve is  functionally normal. · Calcium is noted in the mitral annulus and leaflets. Mild mitral  regurgitation is present. · There are no other important findings on this study.        Irvin Mccray MD 10/9/2020  7:37 PM     · The right heart is  enlarged. Left heart chamber sizes are normal. · There is severe tricuspid regurgitation. Septal motion (ventricular)  suggests an RV volume overload. · The atrial septum is abnormal. A septal \"patch\" is suggested. There is  no evidence of shunting by agitated saline study. · The estimated PA pressure is normal to slightly elevated. The estimated  RA pressure is elevated. · There is moderate concentric left ventricular hypertrophy. · Global " "and segmental LV wall motion is normal with an estimated LV  ejection fraction of 56%-60%. Estimated RV systolic function is normal. · There are fibrocalcific changes in the aortic valve. The valve is  functionally normal. · Calcium is noted in the mitral annulus and leaflets. Mild mitral  regurgitation is present. · There are no other important findings on this study. · Left ventricular ejection fraction appears to be 56 - 60%. · Left ventricular wall thickness is consistent with concentric  hypertrophy.        Irvin Mccray MD 10/6/2020  6:07 PM          Narrative · The right heart is enlarged. Left heart chamber sizes are normal.  · There is severe tricuspid regurgitation. Septal motion (ventricular)   suggests an RV volume overload.  · The atrial septum is abnormal. A septal \"patch\" is suggested. There is   no evidence of shunting by agitated saline study.  · The estimated PA pressure is normal to slightly elevated. The estimated   RA pressure is elevated.  · There is moderate concentric left ventricular hypertrophy.  · Global and segmental LV wall motion is normal with an estimated LV   ejection fraction of 56%-60%.  · There are fibrocalcific changes in the aortic valve. The valve is   functionally normal.  · Calcium is noted in the mitral annulus and leaflets. Mild mitral   regurgitation is present.  · There are no other important findings on this study.          I have reviewed the medications:  Scheduled Meds:aspirin, 81 mg, Oral, Daily  furosemide, 20 mg, Oral, BID  insulin detemir, 17 Units, Subcutaneous, Q12H  insulin lispro, 0-14 Units, Subcutaneous, TID AC  insulin lispro, 5 Units, Subcutaneous, TID With Meals  polyethylene glycol, 17 g, Oral, Daily  rivaroxaban, 20 mg, Oral, Daily With Dinner  sodium chloride, 10 mL, Intravenous, Q12H      Continuous Infusions:   PRN Meds:.•  acetaminophen  •  acetaminophen  •  bisacodyl  •  dextrose  •  dextrose  •  glucagon (human recombinant)  •  melatonin  •  " polyethylene glycol  •  senna-docusate sodium  •  sodium chloride  •  [COMPLETED] Insert peripheral IV **AND** sodium chloride  •  sodium chloride    Assessment/Plan   Assessment & Plan     Active Hospital Problems    Diagnosis  POA   • **Hyponatremia [E87.1]  Yes   • Severe tricuspid regurgitation [I07.1]  Yes   • DM2 (diabetes mellitus, type 2) (CMS/HCC) [E11.9]  Yes   • Anasarca and ascites [R60.1]  Yes   • Elevated serum creatinine [R79.89]  Yes   • Acute UTI (urinary tract infection) [N39.0]  Yes   • Anemia [D64.9]  Yes   • Acute UTI [N39.0]  Yes   • CKD (chronic kidney disease) [N18.9]  Yes   • History of repair of congenital atrial septal defect (ASD) [Z87.74]  Unknown   • Atrial flutter (CMS/HCC) [I48.92]  Yes      Resolved Hospital Problems   No resolved problems to display.        Brief Hospital Course to date:  Ivory Carr is a 75 y.o. female with history of DM 2, CKD 3, A. fib/flutter s/p cardioversion x2 who has had plans for ablation with Dr. Garcia on 10/15; recent cholecystectomy; who presented with generalized weakness abdominal distention found to have new ascites underwent work-up with labs consistent with cardiac ascites, echocardiography suggestive of severe tricuspid regurgitation, now s/p right heart catheterization with mild pulmonary artery hypertension and elevated mean right atrial pressures.        New onset ascites, consistent with cardiac ascites  Severe tricuspid regurgitation  -s/p paracentesis, cells negative for malignancy  -No evidence for chronic liver disease   -plan for RFA as scheduled next week per cardiology     Constipation  -Milk of magnesia  -Add PRN's  - KUB with significant stool burden  - suppository encouraged, discussed with nursing     Chronic atrial flutter  - Rate controlled, continue Xarelto  -Plan for ablation 10/13/2020 with Dr. Garcia     Dyspnea without hypoxia  -Echo with severe TR is noted, EF normal, cardiology suspecting pulmonary hypertension, post RHC  as noted  -Persistent A. fib/flutter could also create sensation of dyspnea     DM type 2, uncontrolled with hyperglycemia, A1c > 14%  -Continue basal insulin and SSI     E. coli UTI  -s/p 3 doses of rocephin     Hypervolemic hyponatremia  -Sodium overall stable, likely component of volume overload and diuretic therapy  -Continue to monitor  - continue 1.5 L fluid restriction    CKD stage 3  - Baseline CR approximately 1.0-1.3 MI: EGFR 40s  -Stable     DVT Prophylaxis: Xarelto      Disposition: I expect the patient to be discharged pending cardiology recs, improvement of constipation    CODE STATUS:   Code Status and Medical Interventions:   Ordered at: 10/06/20 0410     Level Of Support Discussed With:    Patient     Code Status:    CPR     Medical Interventions (Level of Support Prior to Arrest):    Full       Soraya Kay,   10/11/20

## 2020-10-11 NOTE — PLAN OF CARE
Goal Outcome Evaluation:  Plan of Care Reviewed With: patient, spouse  Progress: no change  Outcome Summary: VSS. A-fib on the monitor. Room air. Complaints of toe pain treated with PRN tylenol. Ablation scheduled for Tuesday, Oct.13. Will continue to monitor.

## 2020-10-12 ENCOUNTER — APPOINTMENT (OUTPATIENT)
Dept: PREADMISSION TESTING | Facility: HOSPITAL | Age: 75
End: 2020-10-12

## 2020-10-12 VITALS
BODY MASS INDEX: 28.49 KG/M2 | HEART RATE: 131 BPM | DIASTOLIC BLOOD PRESSURE: 80 MMHG | RESPIRATION RATE: 18 BRPM | WEIGHT: 188 LBS | OXYGEN SATURATION: 94 % | TEMPERATURE: 98.7 F | SYSTOLIC BLOOD PRESSURE: 116 MMHG | HEIGHT: 68 IN

## 2020-10-12 PROBLEM — E11.65 TYPE 2 DIABETES MELLITUS WITH HYPERGLYCEMIA, WITH LONG-TERM CURRENT USE OF INSULIN (HCC): Status: ACTIVE | Noted: 2020-10-06

## 2020-10-12 PROBLEM — N18.31 STAGE 3A CHRONIC KIDNEY DISEASE: Status: ACTIVE | Noted: 2020-10-06

## 2020-10-12 PROBLEM — Z79.4 TYPE 2 DIABETES MELLITUS WITH HYPERGLYCEMIA, WITH LONG-TERM CURRENT USE OF INSULIN (HCC): Status: ACTIVE | Noted: 2020-10-06

## 2020-10-12 PROBLEM — N39.0 ACUTE UTI: Status: RESOLVED | Noted: 2020-10-06 | Resolved: 2020-10-12

## 2020-10-12 PROBLEM — R79.89 ELEVATED SERUM CREATININE: Status: RESOLVED | Noted: 2020-10-06 | Resolved: 2020-10-12

## 2020-10-12 PROBLEM — N39.0 ACUTE UTI (URINARY TRACT INFECTION): Status: RESOLVED | Noted: 2020-10-06 | Resolved: 2020-10-12

## 2020-10-12 LAB
ANION GAP SERPL CALCULATED.3IONS-SCNC: 9 MMOL/L (ref 5–15)
BUN SERPL-MCNC: 20 MG/DL (ref 8–23)
BUN/CREAT SERPL: 17.5 (ref 7–25)
CALCIUM SPEC-SCNC: 9 MG/DL (ref 8.6–10.5)
CHLORIDE SERPL-SCNC: 91 MMOL/L (ref 98–107)
CO2 SERPL-SCNC: 31 MMOL/L (ref 22–29)
CREAT SERPL-MCNC: 1.14 MG/DL (ref 0.57–1)
GFR SERPL CREATININE-BSD FRML MDRD: 46 ML/MIN/1.73
GLUCOSE BLDC GLUCOMTR-MCNC: 124 MG/DL (ref 70–130)
GLUCOSE BLDC GLUCOMTR-MCNC: 216 MG/DL (ref 70–130)
GLUCOSE BLDC GLUCOMTR-MCNC: 84 MG/DL (ref 70–130)
GLUCOSE SERPL-MCNC: 144 MG/DL (ref 65–99)
POTASSIUM SERPL-SCNC: 4.2 MMOL/L (ref 3.5–5.2)
SODIUM SERPL-SCNC: 131 MMOL/L (ref 136–145)

## 2020-10-12 PROCEDURE — C9803 HOPD COVID-19 SPEC COLLECT: HCPCS | Performed by: PHYSICIAN ASSISTANT

## 2020-10-12 PROCEDURE — U0004 COV-19 TEST NON-CDC HGH THRU: HCPCS | Performed by: PHYSICIAN ASSISTANT

## 2020-10-12 PROCEDURE — 82962 GLUCOSE BLOOD TEST: CPT

## 2020-10-12 PROCEDURE — 99232 SBSQ HOSP IP/OBS MODERATE 35: CPT | Performed by: PHYSICIAN ASSISTANT

## 2020-10-12 PROCEDURE — 63710000001 INSULIN DETEMIR PER 5 UNITS: Performed by: INTERNAL MEDICINE

## 2020-10-12 PROCEDURE — 99239 HOSP IP/OBS DSCHRG MGMT >30: CPT | Performed by: PHYSICIAN ASSISTANT

## 2020-10-12 PROCEDURE — 97110 THERAPEUTIC EXERCISES: CPT

## 2020-10-12 PROCEDURE — 80048 BASIC METABOLIC PNL TOTAL CA: CPT | Performed by: PHYSICIAN ASSISTANT

## 2020-10-12 RX ORDER — AMOXICILLIN 250 MG
2 CAPSULE ORAL NIGHTLY
Qty: 60 TABLET | Refills: 2 | Status: ON HOLD | OUTPATIENT
Start: 2020-10-12 | End: 2021-12-22

## 2020-10-12 RX ORDER — POLYETHYLENE GLYCOL 3350 17 G/17G
17 POWDER, FOR SOLUTION ORAL DAILY
Qty: 30 EACH | Refills: 2 | Status: ON HOLD | OUTPATIENT
Start: 2020-10-12 | End: 2021-12-22

## 2020-10-12 RX ORDER — TEMAZEPAM 15 MG/1
15 CAPSULE ORAL NIGHTLY PRN
Status: DISCONTINUED | OUTPATIENT
Start: 2020-10-12 | End: 2020-10-12 | Stop reason: HOSPADM

## 2020-10-12 RX ADMIN — FUROSEMIDE 20 MG: 20 TABLET ORAL at 08:14

## 2020-10-12 RX ADMIN — RIVAROXABAN 20 MG: 20 TABLET, FILM COATED ORAL at 17:05

## 2020-10-12 RX ADMIN — ACETAMINOPHEN 650 MG: 325 TABLET, FILM COATED ORAL at 08:14

## 2020-10-12 RX ADMIN — POLYETHYLENE GLYCOL 3350 17 G: 17 POWDER, FOR SOLUTION ORAL at 08:13

## 2020-10-12 RX ADMIN — ASPIRIN 81 MG: 81 TABLET, COATED ORAL at 08:13

## 2020-10-12 RX ADMIN — MAGNESIUM HYDROXIDE 10 ML: 2400 SUSPENSION ORAL at 08:14

## 2020-10-12 RX ADMIN — FUROSEMIDE 20 MG: 20 TABLET ORAL at 17:04

## 2020-10-12 RX ADMIN — INSULIN LISPRO 5 UNITS: 100 INJECTION, SOLUTION INTRAVENOUS; SUBCUTANEOUS at 17:05

## 2020-10-12 RX ADMIN — INSULIN DETEMIR 17 UNITS: 100 INJECTION, SOLUTION SUBCUTANEOUS at 08:18

## 2020-10-12 RX ADMIN — INSULIN LISPRO 5 UNITS: 100 INJECTION, SOLUTION INTRAVENOUS; SUBCUTANEOUS at 13:18

## 2020-10-12 RX ADMIN — SODIUM CHLORIDE, PRESERVATIVE FREE 10 ML: 5 INJECTION INTRAVENOUS at 08:16

## 2020-10-12 RX ADMIN — INSULIN LISPRO 5 UNITS: 100 INJECTION, SOLUTION INTRAVENOUS; SUBCUTANEOUS at 08:16

## 2020-10-12 NOTE — PROGRESS NOTES
"  [facility]  PROGRESS NOTE    Date of Admission: 10/6/2020  Date of Service: 10/12/20    Primary Care Physician: Christiane Peter APRN    Chief Complaint: f/u severe TR, PAF, abdominal distention   Problem List:   1. Paroxysmal Atrial Flutter  a. Diagnosed approximately 2017  b. ECV x3, last in March 2020  c. Echo 7/2020: biatrial enlargement, normal LVSF with abnormal septal motion;  moderately enlarged RV with normal contractility, mild MR, mod TR  d. Recurrent atrial flutter with RVR August and September 2020   2. History of ASD repair  a. Surgical ASD repair, 1990, [place]  b. Echo 10/6/2020 Dr. Mccray:  EF 56-60%, Right heart enlargement with severe TR. Septal motion suggests RV volume overload. Septal \"patch\" with no shunting by agitated saline. PA pressure normal to slightly elevated, RA pressure elevated. Mild MR, aortic valve is functionally normal.  3. Essential Hypertension   4. Hyperlipidemia; intolerant to Lipitor   5. IDDM2 with peripheral neuropathy   6. Varicose veins   7. Lumbar spine disease  8. Cholecystitis/pancreatitis, s/p cholecystectomy August 2020   9. Anasarca and ascites? October 2020   1. RHC 10/9/20: mild PAH ( 50/18 mmHg), PCWP=20 mmHg, RA pressure 17mmHg, pulmonary vascular resistance is normal; elevated RAP appears to be related to severe TR    Subjective      Patient remains stable with continued abdominal distention, however slightly improved. Dyspnea with exertion remains.     Objective   Vitals: /96   Pulse 99   Temp 98 °F (36.7 °C) (Oral)   Resp 14   Ht 172.7 cm (68\")   Wt 85.3 kg (188 lb)   SpO2 94%   BMI 28.59 kg/m²     Physical Exam:  GENERAL: Alert, cooperative, in no acute distress.   HEENT: Normocephalic, no jugular venous distention  HEART: Irregular rhythm, fast rate, and no murmurs, gallops, or rubs.   LUNGS:  No wheezing, rales or rhonchi.  ABDOMEN: Soft, bowel sounds present  NEUROLOGIC: No focal abnormalities involving " strength or sensation are noted.   EXTREMITIES: No clubbing, cyanosis, 1+ BLE edema noted.     Results:  Results from last 7 days   Lab Units 10/10/20  1237 10/08/20  0959 10/06/20  0052   WBC 10*3/mm3 5.91 5.82 7.77   HEMOGLOBIN g/dL 9.6* 9.5* 10.1*   HEMATOCRIT % 31.0* 30.0* 31.2*   PLATELETS 10*3/mm3 298 269 342     Results from last 7 days   Lab Units 10/11/20  0526 10/10/20  1237 10/09/20  0844   SODIUM mmol/L 129* 127* 131*   POTASSIUM mmol/L 4.1 4.5 4.6   CHLORIDE mmol/L 91* 90* 93*   CO2 mmol/L 27.0 26.0 29.0   BUN mg/dL 21 21 21   CREATININE mg/dL 1.05* 1.08* 1.02*   GLUCOSE mg/dL 125* 209* 118*      Lab Results   Component Value Date    AST 23 10/06/2020    ALT 14 10/06/2020       Results from last 7 days   Lab Units 10/08/20  0959   TSH uIU/mL 2.110         Results from last 7 days   Lab Units 10/06/20  0052   PROTIME Seconds 24.2*   INR  2.20*   APTT seconds 43.4*         Results from last 7 days   Lab Units 10/06/20  0052   PROBNP pg/mL 2,518.0*       Intake/Output Summary (Last 24 hours) at 10/12/2020 1035  Last data filed at 10/11/2020 1300  Gross per 24 hour   Intake 360 ml   Output --   Net 360 ml     I personally reviewed the patient's EKG/Telemetry data    Radiology Data:   [facility] 10/9/20:  Hemodynamic Findings:  · AO pressure: 134/56 mmHg (Cuff)  · RA pressure, mean: 17 mmHg  · RV pressure:  45/0-15 mm Hg  · PA pressure: 50/18-20 mmHg, mean = 28 mmHg (Normal mean PA pressure <25 mmHg)  · PCWP pressure, mean: 20 mmHg  · BSA equals 1.63 m²  · RA SaO2 by pulse oximeter equals 96% on room air  · HR equals 75 bpm (atrial flutter with variable AV block)  · Cardiac output equals 3.3 L/mi cardiac index equals 2.1 L/min/m²  · Pulmonary vascular resistance equals one 194 dyne second centimeter -5 (Normal < 250)        Final Impression:  #1: There is mild pulmonary artery hypertension at rest.                              #2:  Pulmonary vascular resistance is normal.                              #3:  There is  "elevated mean right atrial pressure that appears to be related to severe tricuspid regurgitation.                              #4:  The pulmonary capillary wedge pressure is elevated.      At this time, the best (and only viable option) is diuresis and RFA of her atrial flutter.     Current Medications:  aspirin, 81 mg, Oral, Daily  furosemide, 20 mg, Oral, BID  insulin detemir, 15 Units, Subcutaneous, Q12H  insulin lispro, 0-14 Units, Subcutaneous, TID AC  insulin lispro, 5 Units, Subcutaneous, TID With Meals  magnesium hydroxide, 10 mL, Oral, Daily  polyethylene glycol, 17 g, Oral, Daily  rivaroxaban, 20 mg, Oral, Daily With Dinner  sodium chloride, 10 mL, Intravenous, Q12H      Pharmacy Consult,       Assessment and Plan:     1.  Severe TR              -  Echo 10/6/2020 with  Normal LVEF 55-60%, severe TR, septal \"patch\" with no shunting by agitated saline              - RHC as noted above with mild PAH, elevated RA pressure thought related to severe TR,and elevated PCWP   - plan for diuresis and RFA of Atrial flutter    - OK to go home from cardiac perspective and return Thursday for ablation with Dr. Garcia     2.  Persistent Atrial Flutter              -  Remote ECV x 3 with most recent March 2020.              -  Rates currently controlled and patient with minimal symptoms              -  On Xarelto for stroke prevention.              -  plan for RFA of Aflutter per Dr. Garcia- Thursday as previously scheduled      3.  Anasarca/Ascities              -  Presents with severe abdominal distension and discomfort.               -  CT Abd/Pelvis reveals anasarca and ascites.                -  Minimal to no ascites at time of CT guided paracentesis (50cc)               -  GI following; General surgery to see     4.  Hyponatremia              - per hospitalists     Electronically signed by Sheryl Biag PA-C, 10/12/20, 1:14 PM EDT.      "

## 2020-10-12 NOTE — PROGRESS NOTES
Continued Stay Note  [facility]     Patient Name: Ivory Carr  MRN: [id number]  Today's Date: 10/12/2020    Admit Date: 10/6/2020    Discharge Plan     Row Name 10/12/20 1439       Plan    Plan  Home    Patient/Family in Agreement with Plan  yes    Plan Comments  Pt's plans to dc to home. To have RFA per Cardiology, possibly tomorrow. Her abdominal distention seems to be better per notes. Will cont. to follow.    Final Discharge Disposition Code  30 - still a patient        Discharge Codes    No documentation.       Expected Discharge Date and Time     Expected Discharge Date Expected Discharge Time    Oct 10, 2020             Risa Calhoun RN

## 2020-10-12 NOTE — PLAN OF CARE
Goal Outcome Evaluation:  Plan of Care Reviewed With: patient, spouse  Progress: no change   Pt rested well during the night. She was up and down most of the night. Complained of toe pain, tylenol took care of the pain. Vss. Will continue to monitor.

## 2020-10-12 NOTE — PROGRESS NOTES
Case Management Discharge Note      Final Note: Plan is for pt. to dc to home. No needs were voiced. States family can provide transport.    Provided Post Acute Provider List?: Refused  Refused Provider List Comment: Declines provider list at this time.    Selected Continued Care - Admitted Since 10/6/2020     Destination    No services have been selected for the patient.              Durable Medical Equipment    No services have been selected for the patient.              Dialysis/Infusion    No services have been selected for the patient.              Home Medical Care    No services have been selected for the patient.              Therapy    No services have been selected for the patient.              Community Resources    No services have been selected for the patient.                       Final Discharge Disposition Code: 01 - home or self-care

## 2020-10-12 NOTE — PLAN OF CARE
Problem: Adult Inpatient Plan of Care  Goal: Plan of Care Review  Recent Flowsheet Documentation  Taken 10/12/2020 1532 by Marivel Enrique OT  Progress: improving  Plan of Care Reviewed With:   patient   spouse  Outcome Summary: Pt participated in UE TE in unsupported sitting with good tolerance, occasional rest breaks needed. Continue OT POC to progress ADL's and functional mobility.

## 2020-10-12 NOTE — THERAPY TREATMENT NOTE
Patient Name: Ivory Carr  : 1945    MRN: [id number]                              Today's Date: 10/12/2020       Admit Date: 10/6/2020    Visit Dx:     ICD-10-CM ICD-9-CM   1. Acute UTI  N39.0 599.0   2. Nausea  R11.0 787.02   3. Abdominal distention  R14.0 787.3   4. Other ascites  R18.8 789.59   5. Paroxysmal atrial flutter (CMS/HCC)  I48.92 427.32   6. Chronic anticoagulation  Z79.01 V58.61   7. History of diabetes mellitus  Z86.39 V12.29   8. History of cholecystectomy  Z90.49 V45.79   9. Acute renal insufficiency  N28.9 593.9   10. Hyponatremia  E87.1 276.1   11. Severe tricuspid regurgitation  I07.1 397.0   12. History of repair of congenital atrial septal defect (ASD)  Z87.74 V13.65   13. Anasarca and ascites  R60.1 782.3   14. Anasarca  R60.1 782.3     Patient Active Problem List   Diagnosis   • Atrial flutter (CMS/HCC)   • Type 2 diabetes mellitus with hyperglycemia, with long-term current use of insulin (CMS/HCC)   • Anasarca and ascites   • Hyponatremia   • Anemia   • Stage 3a chronic kidney disease   • Severe tricuspid regurgitation   • History of repair of congenital atrial septal defect (ASD)     Past Medical History:   Diagnosis Date   • ASD (atrial septal defect)    • Atrial flutter (CMS/HCC) 2020    Added automatically from request for surgery [id number]   • CKD (chronic kidney disease) 10/6/2020   • Diabetes (CMS/HCC)    • Hypertension      Past Surgical History:   Procedure Laterality Date   • CARDIAC CATHETERIZATION N/A 10/9/2020    Procedure: Right Heart Cath;  Surgeon: Irvin Mccray MD;  Location: [facility] CATH INVASIVE LOCATION;  Service: Cardiology;  Laterality: N/A;   • CHOLECYSTECTOMY       General Information     Row Name 10/12/20 1529          OT Time and Intention    Document Type  therapy note (daily note)  -JOE     Mode of Treatment  occupational therapy  -JOE     Row Name 10/12/20 1529          General Information    Patient Profile Reviewed  yes  -JOE     Existing  Precautions/Restrictions  fall  -     Row Name 10/12/20 1529          Cognition    Orientation Status (Cognition)  oriented x 4  -     Row Name 10/12/20 1529          Safety Issues, Functional Mobility    Impairments Affecting Function (Mobility)  endurance/activity tolerance;strength  -       User Key  (r) = Recorded By, (t) = Taken By, (c) = Cosigned By    Initials Name Provider Type    Marivel Aquino OT Occupational Therapist        Mobility/ADL's    No documentation.       Obj/Interventions     [facility] Name 10/12/20 1530          Shoulder (Therapeutic Exercise)    Shoulder (Therapeutic Exercise)  AROM (active range of motion)  -     Shoulder Strengthening (Therapeutic Exercise)  bilateral;flexion;aBduction;aDduction;horizontal aBduction/aDduction;scapular stabilization;sitting;10 repetitions;2 sets  -[facility] Name 10/12/20 1530          Elbow/Forearm (Therapeutic Exercise)    Elbow/Forearm (Therapeutic Exercise)  AROM (active range of motion)  -     Elbow/Forearm AROM (Therapeutic Exercise)  bilateral;flexion;extension;supination;pronation;sitting;10 repetitions;2 sets  -     Row Name 10/12/20 1530          Therapeutic Exercise    Therapeutic Exercise  shoulder;elbow/forearm  -       User Key  (r) = Recorded By, (t) = Taken By, (c) = Cosigned By    Initials Name Provider Type    Marivel Aquino OT Occupational Therapist        Goals/Plan    No documentation.       Clinical Impression     [facility] Name 10/12/20 1532          Pain Scale: Numbers Pre/Post-Treatment    Pretreatment Pain Rating  0/10 - no pain  -     Posttreatment Pain Rating  0/10 - no pain  -     Row Name 10/12/20 1532          Plan of Care Review    Plan of Care Reviewed With  patient;spouse  -     Progress  improving  -     Outcome Summary  Pt participated in UE TE in unsupported sitting with good tolerance, occasional rest breaks needed. Continue OT POC to progress ADL's and functional mobility.  -     Row Name 10/12/20  1532          Therapy Plan Review/Discharge Plan (OT)    Anticipated Discharge Disposition (OT)  home with assist  -JOE     Row Name 10/12/20 1532          Vital Signs    O2 Delivery Pre Treatment  room air  -JOE     O2 Delivery Intra Treatment  room air  -JOE     O2 Delivery Post Treatment  room air  -JOE     Pre Patient Position  Supine  -JOE     Intra Patient Position  Sitting  -JOE     Post Patient Position  Supine  -JOE     Row Name 10/12/20 1532          Positioning and Restraints    Pre-Treatment Position  in bed  -JOE     Post Treatment Position  bed  -JOE     In Bed  notified nsg;fowlers;call light within reach;encouraged to call for assist;exit alarm on;with family/caregiver  -JOE       User Key  (r) = Recorded By, (t) = Taken By, (c) = Cosigned By    Initials Name Provider Type    Marivel Aquino OT Occupational Therapist        Outcome Measures     Row Name 10/12/20 1535          How much help from another is currently needed...    Putting on and taking off regular lower body clothing?  3  -JOE     Bathing (including washing, rinsing, and drying)  3  -JOE     Toileting (which includes using toilet bed pan or urinal)  3  -JOE     Putting on and taking off regular upper body clothing  4  -JOE     Taking care of personal grooming (such as brushing teeth)  4  -JOE     Eating meals  4  -JOE     AM-PAC 6 Clicks Score (OT)  21  -JOE     Row Name 10/12/20 1535          Functional Assessment    Outcome Measure Options  AM-PAC 6 Clicks Daily Activity (OT)  -JOE       User Key  (r) = Recorded By, (t) = Taken By, (c) = Cosigned By    Initials Name Provider Type    Marivel Aquino OT Occupational Therapist        Occupational Therapy Education                 Title: PT OT SLP Therapies (Done)     Topic: Occupational Therapy (Done)     Point: ADL training (Done)     Description:   Instruct learner(s) on proper safety adaptation and remediation techniques during self care or transfers.   Instruct in proper use of assistive devices.               Learning Progress Summary           Patient Acceptance, E, VU by  at 10/8/2020 1445    Acceptance, E,D, VU,NR by  at 10/6/2020 1420                   Point: Home exercise program (Done)     Description:   Instruct learner(s) on appropriate technique for monitoring, assisting and/or progressing therapeutic exercises/activities.              Learning Progress Summary           Patient Acceptance, E,D, VU by  at 10/12/2020 1535    Comment: UE HEP; energy conservation for home setting                   Point: Precautions (Done)     Description:   Instruct learner(s) on prescribed precautions during self-care and functional transfers.              Learning Progress Summary           Patient Acceptance, E,D, VU by JOE at 10/12/2020 1535    Comment: UE HEP; energy conservation for home setting    Acceptance, E,D, VU,NR by  at 10/6/2020 1420                   Point: Body mechanics (Done)     Description:   Instruct learner(s) on proper positioning and spine alignment during self-care, functional mobility activities and/or exercises.              Learning Progress Summary           Patient Acceptance, E,D, VU by  at 10/12/2020 1535    Comment: UE HEP; energy conservation for home setting    Acceptance, E,D, VU,NR by  at 10/6/2020 1420                               User Key     Initials Effective Dates Name Provider Type Discipline     06/23/15 -  Rachel Hernadez, OT Occupational Therapist OT     07/18/19 -  Frida Parks, OT Occupational Therapist OT     02/14/20 -  Marivel Enrique OT Occupational Therapist OT              OT Recommendation and Plan  Retired Outcome Summary/Treatment Plan (OT)  Anticipated Discharge Disposition (OT): home with assist  Plan of Care Review  Plan of Care Reviewed With: patient, spouse  Progress: improving  Outcome Summary: Pt participated in UE TE in unsupported sitting with good tolerance, occasional rest breaks needed. Continue OT POC to progress ADL's and  functional mobility.  Plan of Care Reviewed With: patient, spouse  Outcome Summary: Pt participated in UE TE in unsupported sitting with good tolerance, occasional rest breaks needed. Continue OT POC to progress ADL's and functional mobility.     Time Calculation:   Time Calculation- OT     Row Name 10/12/20 1410             Time Calculation- OT    OT Start Time  1410  -JOE      OT Received On  10/12/20  -JOE         Timed Charges    17863 - OT Therapeutic Exercise Minutes  24  -JOE        User Key  (r) = Recorded By, (t) = Taken By, (c) = Cosigned By    Initials Name Provider Type    Marivel Aquino OT Occupational Therapist        Therapy Charges for Today     Code Description Service Date Service Provider Modifiers Qty    [id number] HC OT THER PROC EA 15 MIN 10/12/2020 Marivel Enrique OT GO 2               Marivel Enrique OT  10/12/2020

## 2020-10-12 NOTE — PLAN OF CARE
Goal Outcome Evaluation:  Plan of Care Reviewed With: patient, spouse  Progress: improving     Pt is getting discharged today. Will do Covid 19 swab ( [facility]) before discharge. Pt to have Ablation on Thursday as scheduled per Dr Garcia.

## 2020-10-12 NOTE — PROGRESS NOTES
Adult Nutrition  Assessment/PES    Patient Name:  Ivory Carr  YOB: 1945  MRN: [id number]  Admit Date:  10/6/2020    Assessment Date:  10/12/2020        Reason for Assessment     Row Name 10/12/20 1148          Reason for Assessment    Reason For Assessment  follow-up protocol;   15 mins     Diagnosis  -- per MD notes this adm.                         Nutrition Prescription Ordered     Row Name 10/12/20 1154          Nutrition Prescription PO    Common Modifiers  Consistent Carbohydrate         Evaluation of Received Nutrient/Fluid Intake     Row Name 10/12/20 1154          PO Evaluation    Number of Meals  4     % PO Intake  100               Problem/Interventions:  Problem 1     Row Name 10/12/20 1155          Nutrition Diagnoses Problem 1    Problem 1  No Nutrition Diagnosis at this Time               Intervention Goal     Row Name 10/12/20 1155          Intervention Goal    PO  Maintain intake         Nutrition Intervention     Row Name 10/12/20 1155          Nutrition Intervention    RD/Tech Action  Follow Tx progress;Supplement provided           Education/Evaluation     Row Name 10/12/20 1155          Monitor/Evaluation    Monitor  Per protocol           Electronically signed by:  Maude Nobles MS,RD,LD  10/12/20 11:57 EDT

## 2020-10-12 NOTE — CONSULTS
General Surgery Consultation Note    Date of Service: 10/12/2020  Ivory Carr  [id number]  1945      Referring Provider: Soraya Kay DO    Location of Consult: inpatient     Reason for Consultation: abdominal distention after cholecystectomy     History of Present Illness:  I am seeing, Ivory Carr, in consultation at request of Soraya Kay DO regarding abdominal distention after cholecystectomy. Mrs. Carr is a 75 year old lady with history of DM, CKD, and a fib who was admitted to [facility] on 10/6/20 for weakness and complaints of abdominal distention. Of note, she underwent laparoscopic cholecystectomy at [facility] 2 months ago with Dr. Gonzalez for reported cholecystitis. She reports that she has not felt well since surgery with abdominal distention and weakness. Workup here at [facility] thus far has demonstrated evidence of anasarca and new abdominal ascites, which was sampled and found to be consistent with cardiac ascites. She has had Echo performed which demonstrated significant tricuspid regurgitation and is planned for RFA with cardiology this admission. She reports that the abdominal distention has been gradually progressive over the last several weeks, but feels better since admission here. She had 2 BMs yesterday with improvement and has continued to tolerate a regular diet without nausea or vomiting. She ate all of her breakfast this morning with good appetite and tolerated well. She reports that her incision sites are healed. She reports feeling of abdominal bloating and heaviness, but denies specific pain. She has not experienced jaundice or fevers since surgery.     Problem List Items Addressed This Visit        Genitourinary    Acute UTI - Primary       Other    Anasarca and ascites    Relevant Orders    Case Request Cath Lab: Right Heart Cath (Completed)    Cardiac Catheterization/Vascular Study (Completed)    * (Principal) Hyponatremia     Severe tricuspid regurgitation    Relevant Orders    Case Request Cath Lab: Right Heart Cath (Completed)    Cardiac Catheterization/Vascular Study (Completed)    History of repair of congenital atrial septal defect (ASD)    Relevant Orders    Case Request Cath Lab: Right Heart Cath (Completed)    Cardiac Catheterization/Vascular Study (Completed)      Other Visit Diagnoses     Nausea        Abdominal distention        Other ascites        Paroxysmal atrial flutter (CMS/HCC)        Chronic anticoagulation        History of diabetes mellitus        History of cholecystectomy        Acute renal insufficiency              PMHx:  Past Medical History:   Diagnosis Date   • ASD (atrial septal defect)    • Atrial flutter (CMS/HCC) 9/24/2020    Added automatically from request for surgery [id number]   • CKD (chronic kidney disease) 10/6/2020   • Diabetes (CMS/HCC)    • Hypertension        Past Surgical History:  Past Surgical History:   • CARDIAC CATHETERIZATION    Procedure: Right Heart Cath;  Surgeon: Irvin Mccray MD;  Location: [facility] CATH INVASIVE LOCATION;  Service: Cardiology;  Laterality: N/A;   • CHOLECYSTECTOMY       Allergies:  Allergies   Allergen Reactions   • Statins Myalgia       Medications:  No current facility-administered medications on file prior to encounter.      Current Outpatient Medications on File Prior to Encounter   Medication Sig Dispense Refill   • aspirin 81 MG EC tablet Take 81 mg by mouth Daily.     • furosemide (LASIX) 20 MG tablet Take 20 mg by mouth 2 (Two) Times a Day.     • insulin glargine (LANTUS) 100 UNIT/ML injection Inject  under the skin into the appropriate area as directed Daily.     • lisinopril (PRINIVIL,ZESTRIL) 10 MG tablet Take 10 mg by mouth Daily.     • rivaroxaban (XARELTO) 20 MG tablet Take 20 mg by mouth Daily.           Current Facility-Administered Medications:   •  acetaminophen (TYLENOL) tablet 650 mg, 650 mg, Oral, Q4H PRN, Irvin Mccray MD, 650 mg at  10/12/20 0814  •  acetaminophen (TYLENOL) tablet 650 mg, 650 mg, Oral, Q4H PRN, Sheryl Baig PA-C  •  aspirin EC tablet 81 mg, 81 mg, Oral, Daily, Irvin Mccray MD, 81 mg at 10/12/20 0813  •  bisacodyl (DULCOLAX) suppository 10 mg, 10 mg, Rectal, Daily PRN, Kristy Gaspar PA, 10 mg at 10/11/20 1204  •  dextrose (D50W) 25 g/ 50mL Intravenous Solution 25 g, 25 g, Intravenous, Q15 Min PRN, Irvin Mccray MD  •  dextrose (GLUTOSE) oral gel 15 g, 15 g, Oral, Q15 Min PRN, Irvin Mccray MD  •  furosemide (LASIX) tablet 20 mg, 20 mg, Oral, BID, Irvin Mccray MD, 20 mg at 10/12/20 0814  •  glucagon (human recombinant) (GLUCAGEN DIAGNOSTIC) injection 1 mg, 1 mg, Subcutaneous, Q15 Min PRN, Irvin Mccray MD  •  insulin detemir (LEVEMIR) injection 15 Units, 15 Units, Subcutaneous, Q12H, Virgie Saucedo PA-C  •  insulin lispro (humaLOG) injection 0-14 Units, 0-14 Units, Subcutaneous, TID AC, Irvin Mccray MD, 3 Units at 10/11/20 1204  •  insulin lispro (humaLOG) injection 5 Units, 5 Units, Subcutaneous, TID With Meals, Irvin Mccray MD, 5 Units at 10/12/20 0816  •  magnesium hydroxide (MILK OF MAGNESIA) suspension 2400 mg/10mL 10 mL, 10 mL, Oral, Daily, Soraya Kay DO, 10 mL at 10/12/20 0814  •  melatonin tablet 5 mg, 5 mg, Oral, Nightly PRN, Irvin Mccray MD  •  Pharmacy Consult, , Does not apply, Continuous PRN, Virgie Saucedo PA-C  •  polyethylene glycol (MIRALAX) packet 17 g, 17 g, Oral, Daily PRN, Lg Motta DO  •  polyethylene glycol (MIRALAX) packet 17 g, 17 g, Oral, Daily, Kristy Gaspar PA, 17 g at 10/12/20 0813  •  rivaroxaban (XARELTO) tablet 20 mg, 20 mg, Oral, Daily With Dinner, Pedro Luis Petersen, PharmD, 20 mg at 10/11/20 1809  •  sennosides-docusate (PERICOLACE) 8.6-50 MG per tablet 2 tablet, 2 tablet, Oral, BID PRN, Lg Motta DO, 2 tablet at 10/10/20 0023  •  sodium chloride 0.9 % flush 10 mL, 10 mL, Intravenous, PRN, Mahendra  "Irvin LUZ MD  •  [COMPLETED] Insert peripheral IV, , , Once **AND** sodium chloride 0.9 % flush 10 mL, 10 mL, Intravenous, PRN, Irvin Mccray MD  •  sodium chloride 0.9 % flush 10 mL, 10 mL, Intravenous, Q12H, Irvin Mccray MD, 10 mL at 10/12/20 0816  •  sodium chloride 0.9 % flush 10 mL, 10 mL, Intravenous, PRN, Irvin Mccray MD  •  temazepam (RESTORIL) capsule 15 mg, 15 mg, Oral, Nightly PRN, Soraya Kay DO      Family History:  Family History   Problem Relation Age of Onset   • Hypertension Mother    • Hyperlipidemia Mother        Social History: Pt lives in [facility] .    Tobacco use: Denies    EtOH use : Denies    Illicit drug use: Denies       Review of Systems:   Constitutional: No fevers, chills or malaise   Eyes: Denies visual changes    Cardiovascular: Denies chest pain, palpitations   Pulmonary: Denies cough or shortness of breath   Abdominal/ GI: See HPI    Genitourinary: Denies dysuria or hematuria   Musculoskeletal: Denies any but chronic joint aches, pains or deformities   Psychiatric: No recent mood changes   Neurologic: No paresthesias or loss of function    /96   Pulse 99   Temp 98 °F (36.7 °C) (Oral)   Resp 14   Ht 172.7 cm (68\")   Wt 85.3 kg (188 lb)   SpO2 94%   BMI 28.59 kg/m²   Body mass index is 28.59 kg/m².    Gen: awake, alert, NAD, sitting up in bed resting comfortably   Head: Normocephalic, atraumatic.   Eyes: Pupils equal, round, react to light and accommodation.   Mouth: Oral mucosa without lesions,   Neck: No masses, lymphadenopathy or carotid bruits bilaterally   CV: Rhythm and rate regular, no murmurs, rubs or gallops  Lungs: decreased breath sounds at bases, not labored on room air   Abdomen: obese, soft, no tenderness to palpation, mild distention, recent laparoscopy incision sites well healed   Groin : No obvious hernias bilaterally   Extremities:  No cyanosis, clubbing or edema bilaterally  Lymphatics: No abnormal lymphadenopathy appreciated "   Neurologic: No gross deficits         CBC  Results from last 7 days   Lab Units 10/10/20  1237   WBC 10*3/mm3 5.91   HEMOGLOBIN g/dL 9.6*   HEMATOCRIT % 31.0*   PLATELETS 10*3/mm3 298       CMP  Results from last 7 days   Lab Units 10/11/20  0526  10/06/20  0052   SODIUM mmol/L 129*   < > 120*   POTASSIUM mmol/L 4.1   < > 4.6   CHLORIDE mmol/L 91*   < > 83*   CO2 mmol/L 27.0   < > 27.0   BUN mg/dL 21   < > 29*   CREATININE mg/dL 1.05*   < > 1.18*   CALCIUM mg/dL 8.9   < > 8.8   BILIRUBIN mg/dL  --   --  0.3   ALK PHOS U/L  --   --  99   ALT (SGPT) U/L  --   --  14   AST (SGOT) U/L  --   --  23   GLUCOSE mg/dL 125*   < > 217*    < > = values in this interval not displayed.       Radiology  Imaging Results (Last 72 Hours)     Procedure Component Value Units Date/Time    XR Abdomen KUB [[id number]] Collected: 10/11/20 1131     Updated: 10/11/20 1843    Narrative:         EXAMINATION: XR ABDOMEN KUB - 10/11/2020     INDICATION:  N39.0-Urinary tract infection, site not specified;  R11.0-Nausea; R14.0-Abdominal distension (gaseous); R18.8-Other ascites;  I48.92-Unspecified atrial flutter; Z79.01-Long term (current) use of  anticoagulants; Z86.39-Personal history of other endocrine, nutritional  and metabolic disease; Z90.49-Acquired absence of other specified parts  of digestive tract. Abdominal distention.     COMPARISON: None.     FINDINGS: KUB abdomen reveals nonspecific nonobstructive bowel gas  pattern with gas extending to the level of the rectum. No gross  intraperitoneal free air with moderate colonic stool burden.  Degenerative changes throughout the spine and pelvis without acute  osseous findings on limited evaluation.           Impression:      Nonspecific nonobstructive bowel gas pattern with at least  moderate colonic stool burden.     DICTATED:   10/11/2020  EDITED/ls :   10/11/2020      This report was finalized on 10/11/2020 6:40 PM by Dr. Ronan Manzano.                 Results Review: I have personally  reviewed all of the recent lab and imaging results available at this time.     Assessment:  Mrs. Carr is a 75 year old lady with history of DM, CKD, and a fib/flutter admitted to the Medicine service for new onset cardiac ascites and severe tricuspid regurgitation seen by General Surgery due to complaints of abdominal distention after OSH Laparoscopic Cholecystectomy 2 months prior. She had LFTs last completed on 10/6/20 which were within normal limits. I have personally reviewed her CT of the abd/pel which was performed on 10/6/20 and do not appreciate any specific fluid collection in her gallbladder fossa or other abdominal pathology other than small-moderate ascites and some mild small bowel dilation. She subjectively feels somewhat better today after BM yesterday and has continued to tolerate a diet without difficulty. I do not appreciate any specific complication related to recent laparoscopic cholecystectomy at OSH.     Plan:  -I do not appreciate any specific complication related to recent outpatient cholecystectomy at OSH. The patient's abdominal complaints may be related to ileus or anasarca/ascites from ongoing cardiac disease  -Would recommend to continue regular diet and bowel regimen as tolerated  -Please let me know if I can be of further assistance in this patient's care       I discussed the patient's findings and my recommendations with the patient and/or family    Michael Clifford MD  10/12/20  10:28 EDT

## 2020-10-12 NOTE — PROGRESS NOTES
[facility] Medicine Services  PROGRESS NOTE    Patient Name: Ivory Carr  : 1945  MRN: [id number]    Date of Admission: 10/6/2020  Primary Care Physician: Christiane Peter APRN    Subjective     CC: f/u atrial fibrillation, ascites / abdominal distention     HPI:  Sitting up in bed. Didn't sleep well last night. No episodes of palpitations yet today. No chest pain/pressure, dyspnea, nausea or vomiting. Bowels moved yesterday afternoon. Abdominal distention feels somewhat improved. BLE edema persists     Review of Systems   Gen- No fevers, chills  CV- No chest pain, (+) intermittent palpitations  Resp- No cough, dyspnea  GI- No N/V/D, (+) abdominal distention     Objective     Vital Signs:   Temp:  [97.9 °F (36.6 °C)-98.5 °F (36.9 °C)] 98 °F (36.7 °C)  Heart Rate:  [] 99  Resp:  [14-16] 14  BP: (132-162)/(66-96) 132/96        Physical Exam:  Constitutional: No acute distress, awake, alert and conversant. Sitting up in bed.   HENT: NCAT, mucous membranes moist  Respiratory: Clear to auscultation bilaterally, respiratory effort normal on room air   Cardiovascular: Irregularly irregular rate & rhythm without m/r/g. Palpable pedal pulses bilaterally  Gastrointestinal: Positive bowel sounds, soft, nontender, distended   Musculoskeletal: 1+ pitting bilateral ankle edema, BLEs tender to palpation   Psychiatric: Appropriate affect, cooperative  Neurologic: Oriented x 3, moves all extremities spontaneously, speech clear  Skin: No rashes    Results Reviewed:  Results from last 7 days   Lab Units 10/10/20  1237 10/08/20  0959 10/06/20  0052   WBC 10*3/mm3 5.91 5.82 7.77   HEMOGLOBIN g/dL 9.6* 9.5* 10.1*   HEMATOCRIT % 31.0* 30.0* 31.2*   PLATELETS 10*3/mm3 298 269 342   INR   --   --  2.20*     Results from last 7 days   Lab Units 10/11/20  0526 10/10/20  1237 10/09/20  0844  10/06/20  0052   SODIUM mmol/L 129* 127* 131*   < > 120*   POTASSIUM mmol/L 4.1 4.5 4.6   < > 4.6   CHLORIDE  mmol/L 91* 90* 93*   < > 83*   CO2 mmol/L 27.0 26.0 29.0   < > 27.0   BUN mg/dL 21 21 21   < > 29*   CREATININE mg/dL 1.05* 1.08* 1.02*   < > 1.18*   GLUCOSE mg/dL 125* 209* 118*   < > 217*   CALCIUM mg/dL 8.9 9.2 9.3   < > 8.8   ALT (SGPT) U/L  --   --   --   --  14   AST (SGOT) U/L  --   --   --   --  23   PROBNP pg/mL  --   --   --   --  2,518.0*    < > = values in this interval not displayed.     Estimated Creatinine Clearance: 51.4 mL/min (A) (by C-G formula based on SCr of 1.05 mg/dL (H)).    Microbiology Results Abnormal     Procedure Component Value - Date/Time    Body Fluid Culture - Body Fluid, Peritoneum [[id number]] Collected: 10/07/20 1436    Lab Status: Final result Specimen: Body Fluid from Peritoneum Updated: 10/10/20 1102     Body Fluid Culture No growth at 3 days     Gram Stain Few (2+) WBCs seen      No organisms seen    Urine Culture - Urine, Urine, Clean Catch [[id number]]  (Abnormal)  (Susceptibility) Collected: 10/06/20 0056    Lab Status: Final result Specimen: Urine, Clean Catch Updated: 10/08/20 0136     Urine Culture >100,000 CFU/mL Escherichia coli    Susceptibility      Escherichia coli     YISSEL     Amikacin Susceptible     Ampicillin Resistant     Ampicillin + Sulbactam Intermediate     Cefazolin Susceptible     Cefepime Susceptible     Ceftazidime Susceptible     Ceftriaxone Susceptible     Gentamicin Resistant     Levofloxacin Susceptible     Nitrofurantoin Susceptible     Piperacillin + Tazobactam Susceptible     Tetracycline Resistant     Tobramycin Intermediate     Trimethoprim + Sulfamethoxazole Resistant                    COVID PRE-OP / PRE-PROCEDURE SCREENING ORDER (NO ISOLATION) - Swab, Nasopharynx [[id number]]  (Normal) Collected: 10/06/20 0420    Lab Status: Final result Specimen: Swab from Nasopharynx Updated: 10/06/20 0606    Narrative:      The following orders were created for panel order COVID PRE-OP / PRE-PROCEDURE SCREENING ORDER (NO ISOLATION) - Swab,  Nasopharynx.  Procedure                               Abnormality         Status                     ---------                               -----------         ------                     COVID-19,CEPHEID,FANTA IN-...[[id number]]  Normal              Final result                 Please view results for these tests on the individual orders.    COVID-19,CEPHEID,FANTA IN-HOUSE(OR EMERGENT/ADD-ON),NP SWAB IN TRANSPORT MEDIA 3-4 HR TAT - Swab, Nasopharynx [[id number]]  (Normal) Collected: 10/06/20 0420    Lab Status: Final result Specimen: Swab from Nasopharynx Updated: 10/06/20 0606     COVID19 Not Detected    Narrative:      Fact sheet for providers: https://www.fda.gov/media/852810/download     Fact sheet for patients: https://www.fda.gov/media/584689/download        Imaging Results (Last 24 Hours)     Procedure Component Value Units Date/Time    XR Abdomen KUB [[id number]] Collected: 10/11/20 1131     Updated: 10/11/20 1843    Narrative:         EXAMINATION: XR ABDOMEN KUB - 10/11/2020     INDICATION:  N39.0-Urinary tract infection, site not specified;  R11.0-Nausea; R14.0-Abdominal distension (gaseous); R18.8-Other ascites;  I48.92-Unspecified atrial flutter; Z79.01-Long term (current) use of  anticoagulants; Z86.39-Personal history of other endocrine, nutritional  and metabolic disease; Z90.49-Acquired absence of other specified parts  of digestive tract. Abdominal distention.     COMPARISON: None.     FINDINGS: KUB abdomen reveals nonspecific nonobstructive bowel gas  pattern with gas extending to the level of the rectum. No gross  intraperitoneal free air with moderate colonic stool burden.  Degenerative changes throughout the spine and pelvis without acute  osseous findings on limited evaluation.           Impression:      Nonspecific nonobstructive bowel gas pattern with at least  moderate colonic stool burden.     DICTATED:   10/11/2020  EDITED/ls :   10/11/2020      This report was finalized on 10/11/2020 6:40 PM by  "Dr. Ronan Manzaon.           Results for orders placed during the hospital encounter of 10/06/20   Transthoracic Echo Complete With Contrast if Necessary Per Protocol    Addendum · The right heart is enlarged. Left heart chamber sizes are normal. · There is severe tricuspid regurgitation. Septal motion (ventricular)  suggests an RV volume overload. · The atrial septum is abnormal. A septal \"patch\" is suggested. There is  no evidence of shunting by agitated saline study. · The estimated PA pressure is normal to slightly elevated. The estimated  RA pressure is elevated. · There is moderate concentric left ventricular hypertrophy. · Global and segmental LV wall motion is normal with an estimated LV  ejection fraction of 56%-60%. Estimated RV systolic function is normal. · There are fibrocalcific changes in the aortic valve. The valve is  functionally normal. · Calcium is noted in the mitral annulus and leaflets. Mild mitral  regurgitation is present. · There are no other important findings on this study.        Irvin Mccray MD 10/9/2020  7:37 PM     · The right heart is  enlarged. Left heart chamber sizes are normal. · There is severe tricuspid regurgitation. Septal motion (ventricular)  suggests an RV volume overload. · The atrial septum is abnormal. A septal \"patch\" is suggested. There is  no evidence of shunting by agitated saline study. · The estimated PA pressure is normal to slightly elevated. The estimated  RA pressure is elevated. · There is moderate concentric left ventricular hypertrophy. · Global and segmental LV wall motion is normal with an estimated LV  ejection fraction of 56%-60%. Estimated RV systolic function is normal. · There are fibrocalcific changes in the aortic valve. The valve is  functionally normal. · Calcium is noted in the mitral annulus and leaflets. Mild mitral  regurgitation is present. · There are no other important findings on this study. · Left ventricular ejection fraction " "appears to be 56 - 60%. · Left ventricular wall thickness is consistent with concentric  hypertrophy.        Irvin Mccray MD 10/6/2020  6:07 PM          Narrative · The right heart is enlarged. Left heart chamber sizes are normal.  · There is severe tricuspid regurgitation. Septal motion (ventricular)   suggests an RV volume overload.  · The atrial septum is abnormal. A septal \"patch\" is suggested. There is   no evidence of shunting by agitated saline study.  · The estimated PA pressure is normal to slightly elevated. The estimated   RA pressure is elevated.  · There is moderate concentric left ventricular hypertrophy.  · Global and segmental LV wall motion is normal with an estimated LV   ejection fraction of 56%-60%.  · There are fibrocalcific changes in the aortic valve. The valve is   functionally normal.  · Calcium is noted in the mitral annulus and leaflets. Mild mitral   regurgitation is present.  · There are no other important findings on this study.          I have reviewed the medications:  Scheduled Meds:aspirin, 81 mg, Oral, Daily  furosemide, 20 mg, Oral, BID  insulin detemir, 15 Units, Subcutaneous, Q12H  insulin lispro, 0-14 Units, Subcutaneous, TID AC  insulin lispro, 5 Units, Subcutaneous, TID With Meals  magnesium hydroxide, 10 mL, Oral, Daily  polyethylene glycol, 17 g, Oral, Daily  rivaroxaban, 20 mg, Oral, Daily With Dinner  sodium chloride, 10 mL, Intravenous, Q12H      Continuous Infusions:   PRN Meds:.•  acetaminophen  •  acetaminophen  •  bisacodyl  •  dextrose  •  dextrose  •  glucagon (human recombinant)  •  melatonin  •  polyethylene glycol  •  senna-docusate sodium  •  sodium chloride  •  [COMPLETED] Insert peripheral IV **AND** sodium chloride  •  sodium chloride    Assessment & Plan     Active Hospital Problems    Diagnosis  POA   • **Hyponatremia [E87.1]  Yes   • Severe tricuspid regurgitation [I07.1]  Yes   • DM2 (diabetes mellitus, type 2) (CMS/HCC) [E11.9]  Yes   • Anasarca " and ascites [R60.1]  Yes   • Elevated serum creatinine [R79.89]  Yes   • Acute UTI (urinary tract infection) [N39.0]  Yes   • Anemia [D64.9]  Yes   • Acute UTI [N39.0]  Yes   • CKD (chronic kidney disease) [N18.9]  Yes   • History of repair of congenital atrial septal defect (ASD) [Z87.74]  Unknown   • Atrial flutter (CMS/HCC) [I48.92]  Yes      Resolved Hospital Problems   No resolved problems to display.     Brief Hospital Course to date:  Ivory Carr is a 75 y.o. female with PMH significant for congenital ASD (s/p repair), CKD III, insulin-dependent DMII, recent CCY and atrial fibrillation/flutter s/p cardioversion x 2 (Xarelto). Outpatient ablation planned for 10/15 with Dr. Garcia. She was admitted to [facility] ED on 10/6/20 for evaluation of generalized weakness and abdominal distention. She was found to have new ascites. Workup for ascites consistent with cardiac ascites and ECHO suggestive of severe tricuspid regurgitation. 10/9 RHC showed mild pulmonary arterial hypertension and elevated mean R atrial pressures. EP following with plans for RFA on 10/13/20.     New onset cardiac ascites  Severe tricuspid regurgitation  - s/p CT-guided paracentesis with 50mL removed   - Cytology negative for malignancy  - Continue Lasix 20mg PO daily   - EP following, ablation planned for this week     Dyspnea without hypoxia  - ECHO notable for severe TR (secondary to late closure of ASD)  - RHO showed mild pulmonary arterial hypertension and elevated mean right atrial pressures   - Could also consider persistent atrial fibrillation / flutter as source of dyspnea     Constipation  - KUB with significant stool burden   - Continue scheduled milk of magnesia  - Bowels now moving     Persistent atrial flutter  - s/p ECV x 3, most recently in March 2020   - Intermittent RVR  - Continue Xarelto for stroke prevension   - Ablation planned for this week with Dr. Garcia     Hypervolemic hyponatremia  - Sodium overall  stable - likely a component of volume overload and diuretic therapy  - Continue 1500mL fluid restriction   - BMP ordered for today, not yet drawn      Uncontrolled DMII  - Hgb A1c 14%   - Lantus on home meds - no dose. Will ask pharmacy to clarify   - Decrease Levemir to 15 units BID, continue Lispro 5 units TID AC + SSI     E. Coli UTI  - s/p IV Rocephin x 4 doses     CKD III  - Baseline creatinine 1.0-1.3  - Stable     DVT Prophylaxis: Xarelto  Disposition: I expect the patient to be discharged home when cleared by cardiology after ablation    CODE STATUS:   Code Status and Medical Interventions:   Ordered at: 10/06/20 0410     Level Of Support Discussed With:    Patient     Code Status:    CPR     Medical Interventions (Level of Support Prior to Arrest):    Full     Virgie Saucedo PA-C  10/12/20

## 2020-10-12 NOTE — DISCHARGE SUMMARY
[facility] Medicine Services  DISCHARGE SUMMARY    Patient Name: Ivory Carr  : 1945  MRN: [id number]    Date of Admission: 10/6/2020 12:18 AM  Date of Discharge:  10/12/2020  Primary Care Physician: Christiane Peter APRN    Consults     Date and Time Order Name Status Description    10/11/2020 0952 Inpatient General Surgery Consult Completed     10/7/2020 0840 Inpatient Cardiology Consult      10/6/2020 0439 Inpatient Gastroenterology Consult Completed         Hospital Course     Presenting Problem:   Anasarca [R60.1]  Anasarca [R60.1]  Acute UTI [N39.0]    Active Hospital Problems    Diagnosis  POA   • **Hyponatremia [E87.1]  Yes   • Severe tricuspid regurgitation [I07.1]  Yes   • Type 2 diabetes mellitus with hyperglycemia, with long-term current use of insulin (CMS/HCC) [E11.65, Z79.4]  Not Applicable   • Anasarca and ascites [R60.1]  Yes   • Anemia [D64.9]  Yes   • Stage 3a chronic kidney disease [N18.31]  Yes   • History of repair of congenital atrial septal defect (ASD) [Z87.74]  Yes   • Atrial flutter (CMS/HCC) [I48.92]  Yes      Resolved Hospital Problems    Diagnosis Date Resolved POA   • Elevated serum creatinine [R79.89] 10/12/2020 Yes   • Acute UTI (urinary tract infection) [N39.0] 10/12/2020 Yes   • Acute UTI [N39.0] 10/12/2020 Yes      Hospital Course:  Ivory Carr is a 75 y.o. female with PMH significant for congenital ASD (s/p repair), CKD III, insulin-dependent DMII, recent CCY and atrial fibrillation/flutter s/p cardioversion x 2 (Xarelto). Outpatient ablation planned for 10/15 with Dr. Garcia. She was admitted to [facility] ED on 10/6/20 for evaluation of generalized weakness and abdominal distention. She was found to have new ascites. Workup for ascites consistent with cardiac ascites and ECHO suggestive of severe tricuspid regurgitation. 10/9 RHC showed mild pulmonary arterial hypertension and elevated mean R atrial pressures. EP following  with plans for RFA on 10/13/20.      New onset cardiac ascites  Severe tricuspid regurgitation  - s/p CT-guided paracentesis with 50mL removed   - Cytology negative for malignancy  - Continue Lasix 20mg PO daily   - EP following, ablation planned for 10/15/20      Dyspnea without hypoxia  - ECHO notable for severe TR (secondary to late closure of ASD)  - RHC showed mild pulmonary arterial hypertension and elevated mean right atrial pressures   - Could also consider persistent atrial fibrillation / flutter as source of dyspnea      Constipation  - KUB with significant stool burden   - Bowels moving better - home on Senna QHS + daily Miralax       Persistent atrial flutter  - s/p ECV x 3, most recently in March 2020   - Intermittent RVR  - Continue Xarelto for stroke prevension   - Ablation planned for 10/15 with Dr. Garcia      Hypervolemic hyponatremia  - Na 120 on admission, improved to 131 on day of discharge   - Sodium overall stable - likely a component of volume overload and diuretic therapy  - Continue 1500mL fluid restriction        Uncontrolled DMII  - Hgb A1c 14%   - At home, on Lanrus 50 units QHS + Insulin R sliding scale      E. Coli UTI  - s/p IV Rocephin x 4 doses      CKD III  - Baseline creatinine 1.0-1.3  - Stable     Recent CCY  - Evaluated by general surgery (Dr. Clifford) prior to discharge - no surgical issues. Abdominal issues felt to be related to anasarca/ascites due to ongoing cardiac disease    Day of Discharge     HPI:   Sitting up in bed. Didn't sleep well last night. No episodes of palpitations yet today. No chest pain/pressure, dyspnea, nausea or vomiting. Bowels moved yesterday afternoon. Abdominal distention feels somewhat improved. BLE edema persists   Patient to DC home today - no EP availability for ablation until her previously scheduled procedure on Thursday 10/15    Review of Systems  Gen- No fevers, chills  CV- No chest pain, (+) intermittent palpitations  Resp- No cough,  dyspnea  GI- No N/V/D, (+) abdominal distention     Vital Signs:   Temp:  [97.9 °F (36.6 °C)-98.8 °F (37.1 °C)] 98.8 °F (37.1 °C)  Heart Rate:  [] 102  Resp:  [14-18] 18  BP: (132-162)/(66-96) 154/83     Physical Exam:  Constitutional: No acute distress, awake, alert and conversant. Sitting up in bed.   HENT: NCAT, mucous membranes moist  Respiratory: Clear to auscultation bilaterally, respiratory effort normal on room air   Cardiovascular: Irregularly irregular rate & rhythm without m/r/g. Palpable pedal pulses bilaterally  Gastrointestinal: Positive bowel sounds, soft, nontender, distended   Musculoskeletal: 1+ pitting bilateral ankle edema, BLEs tender to palpation   Psychiatric: Appropriate affect, cooperative  Neurologic: Oriented x 3, moves all extremities spontaneously, speech clear  Skin: No rashes    Pertinent  and/or Most Recent Results     Results from last 7 days   Lab Units 10/12/20  1048 10/11/20  0526 10/10/20  1237 10/09/20  0844 10/08/20  0959 10/06/20  0052   WBC 10*3/mm3  --   --  5.91  --  5.82 7.77   HEMOGLOBIN g/dL  --   --  9.6*  --  9.5* 10.1*   HEMATOCRIT %  --   --  31.0*  --  30.0* 31.2*   PLATELETS 10*3/mm3  --   --  298  --  269 342   SODIUM mmol/L 131* 129* 127* 131* 126* 120*   POTASSIUM mmol/L 4.2 4.1 4.5 4.6 4.9 4.6   CHLORIDE mmol/L 91* 91* 90* 93* 88* 83*   CO2 mmol/L 31.0* 27.0 26.0 29.0 29.0 27.0   BUN mg/dL 20 21 21 21 23 29*   CREATININE mg/dL 1.14* 1.05* 1.08* 1.02* 1.24* 1.18*   GLUCOSE mg/dL 144* 125* 209* 118* 169* 217*   CALCIUM mg/dL 9.0 8.9 9.2 9.3 8.9 8.8     Results from last 7 days   Lab Units 10/06/20  0052   BILIRUBIN mg/dL 0.3   ALK PHOS U/L 99   ALT (SGPT) U/L 14   AST (SGOT) U/L 23   PROTIME Seconds 24.2*   INR  2.20*   APTT seconds 43.4*     Brief Urine Lab Results  (Last result in the past 365 days)      Color   Clarity   Blood   Leuk Est   Nitrite   Protein   CREAT   Urine HCG        10/06/20 0056 Yellow Cloudy Small (1+) Large (3+) Negative Trace              Microbiology Results Abnormal     Procedure Component Value - Date/Time    Body Fluid Culture - Body Fluid, Peritoneum [[id number]] Collected: 10/07/20 1436    Lab Status: Final result Specimen: Body Fluid from Peritoneum Updated: 10/10/20 1102     Body Fluid Culture No growth at 3 days     Gram Stain Few (2+) WBCs seen      No organisms seen    Urine Culture - Urine, Urine, Clean Catch [[id number]]  (Abnormal)  (Susceptibility) Collected: 10/06/20 0056    Lab Status: Final result Specimen: Urine, Clean Catch Updated: 10/08/20 0136     Urine Culture >100,000 CFU/mL Escherichia coli    Susceptibility      Escherichia coli     YISSEL     Amikacin Susceptible     Ampicillin Resistant     Ampicillin + Sulbactam Intermediate     Cefazolin Susceptible     Cefepime Susceptible     Ceftazidime Susceptible     Ceftriaxone Susceptible     Gentamicin Resistant     Levofloxacin Susceptible     Nitrofurantoin Susceptible     Piperacillin + Tazobactam Susceptible     Tetracycline Resistant     Tobramycin Intermediate     Trimethoprim + Sulfamethoxazole Resistant                    COVID PRE-OP / PRE-PROCEDURE SCREENING ORDER (NO ISOLATION) - Swab, Nasopharynx [[id number]]  (Normal) Collected: 10/06/20 0420    Lab Status: Final result Specimen: Swab from Nasopharynx Updated: 10/06/20 0606    Narrative:      The following orders were created for panel order COVID PRE-OP / PRE-PROCEDURE SCREENING ORDER (NO ISOLATION) - Swab, Nasopharynx.  Procedure                               Abnormality         Status                     ---------                               -----------         ------                     COVID-19,CEPHEID,FANTA IN-...[[id number]]  Normal              Final result                 Please view results for these tests on the individual orders.    COVID-19,CEPHEID,FANTA IN-HOUSE(OR EMERGENT/ADD-ON),NP SWAB IN TRANSPORT MEDIA 3-4 HR TAT - Swab, Nasopharynx [[id number]]  (Normal) Collected: 10/06/20 0420    Lab Status:  Final result Specimen: Swab from Nasopharynx Updated: 10/06/20 0606     COVID19 Not Detected    Narrative:      Fact sheet for providers: https://www.fda.gov/media/189189/download     Fact sheet for patients: https://www.fda.gov/media/015698/download        Imaging Results (All)     Procedure Component Value Units Date/Time    XR Abdomen KUB [[id number]] Collected: 10/11/20 1131     Updated: 10/11/20 1843    Narrative:         EXAMINATION: XR ABDOMEN KUB - 10/11/2020     INDICATION:  N39.0-Urinary tract infection, site not specified;  R11.0-Nausea; R14.0-Abdominal distension (gaseous); R18.8-Other ascites;  I48.92-Unspecified atrial flutter; Z79.01-Long term (current) use of  anticoagulants; Z86.39-Personal history of other endocrine, nutritional  and metabolic disease; Z90.49-Acquired absence of other specified parts  of digestive tract. Abdominal distention.     COMPARISON: None.     FINDINGS: KUB abdomen reveals nonspecific nonobstructive bowel gas  pattern with gas extending to the level of the rectum. No gross  intraperitoneal free air with moderate colonic stool burden.  Degenerative changes throughout the spine and pelvis without acute  osseous findings on limited evaluation.           Impression:      Nonspecific nonobstructive bowel gas pattern with at least  moderate colonic stool burden.     DICTATED:   10/11/2020  EDITED/ls :   10/11/2020      This report was finalized on 10/11/2020 6:40 PM by Dr. Ronan Manzano.       CT Guided Paracentesis [[id number]] Collected: 10/07/20 1546     Updated: 10/07/20 1701    Narrative:      PROCEDURE: CT-guided paracentesis     Procedural Personnel  Attending physician(s): TRIXIE Dang M.D.  Fellow physician(s): None  Resident physician(s): None  Advanced practice provider(s): None     Pre-procedure diagnosis: Past medical history of type 2 diabetes, CKD  stage III,??atrial flutter s/p?cardioversion x2 with plans for ablation  by Dr. Garcia on?10/15, recent CCY,?she presents  the ED with generalized  weakness,?and abdominal distension.?CT abdomen/pelvis does show  minimal/small volume ascites.  Post-procedure diagnosis: Same  Indication: Diagnostic  Additional clinical history: None     Complications: No immediate complications.       Impression:         CT-guided paracentesis with drainage of 50 mL of serous fluid. Fluid  sent for requested laboratory analysis     Plan:      Resume care by clinical team.  _______________________________________________________________     PROCEDURE SUMMARY:  - Limited CT  - CT-guided paracentesis  - Additional procedure(s): None     PROCEDURE DETAILS:     Pre-procedure  Consent: Informed consent for the procedure including risks, benefits  and alternatives was obtained and time-out was performed prior to the  procedure.  Preparation: The site was prepared and draped using maximal sterile  barrier technique including cutaneous antisepsis.     Anesthesia/sedation  Level of anesthesia/sedation: No sedation     Initial abdominal CT  Initial abdominal CT was performed.  Findings: Minimal/small volume ascites. A safe window for paracentesis  was identified.     Paracentesis  Local anesthesia was administered. The peritoneal cavity was accessed  and needle position confirmed by CT. Ascites was drained. The needle was  then removed, and a sterile bandage was applied.  Paracentesis access technique: Direct CT fluoroscopic guidance  Needle placed: 22-gauge Chiba  Post-drainage CT: Not performed     Radiation Dose  CT dose length product (mGy-cm): 798      Additional Details  Additional description of procedure: None  Equipment details: None  Specimens removed: Abdominal fluid  Estimated blood loss (mL): Less than 10  Standardized report: Paracentesis     Attestation  I was present and scrubbed for the entire procedure. Imaging reviewed.  Agree with final report as written.     This report was finalized on 10/7/2020 3:58 PM by Torin Dang.       XR Chest 1 View  [[id number]] Collected: 10/06/20 0211     Updated: 10/06/20 0213    Narrative:      CR Chest 1 Vw    INDICATION:   Shortness of air. Edema.     COMPARISON:    None available.    FINDINGS:  Single portable AP view(s) of the chest.  Heart is enlarged status post CABG. There is a small right pleural effusion with atelectasis at the right lung base. Lungs otherwise are clear. No pneumothorax is seen.      Impression:      Cardiomegaly with a small right effusion and right base atelectasis.    Signer Name: Danielito Plascencia MD   Signed: 10/6/2020 2:11 AM   Workstation Name: [facility]    [facility]    CT Abdomen Pelvis Without Contrast [[id number]] Collected: 10/06/20 0206     Updated: 10/06/20 0209    Narrative:      CT Abdomen Pelvis WO    INDICATION:   Abdominal pain with distention. Nausea.    TECHNIQUE:   CT of the abdomen and pelvis without IV contrast. Coronal and sagittal reconstructions were obtained.  Radiation dose reduction techniques included automated exposure control or exposure modulation based on body size. Count of known CT and cardiac nuc  med studies performed in previous 12 months: 0.     COMPARISON:   None available.    FINDINGS:  There is a small right pleural effusion. There is atelectasis in the right lung base. There is atherosclerotic disease but no aortic aneurysm. No renal or ureteral stones are seen. There is no hydronephrosis. Unenhanced solid abdominal organs are grossly  normal. Gallbladder is surgically absent. There is a small amount of ascites in the abdomen and pelvis. Urinary bladder has a thickened wall concerning for cystitis. Solid pelvic organs are grossly normal. The appendix is normal. The colon is normal as  well. There is some gas in nondistended small bowel loops which may reflect a mild ileus. There is diffuse degenerative disease in the lumbar spine. There is mild anasarca.      Impression:      1. Thick-walled urinary bladder concerning for  "cystitis.  2. Small amount of ascites with generalized anasarca.  3. Mild ileus pattern in the small bowel with no definite obstruction. The appendix is normal.  4. No renal or ureteral stones. No hydronephrosis.  5. Small right pleural effusion.  6. Cholecystectomy.    Signer Name: Danielito Plascencia MD   Signed: 10/6/2020 2:06 AM   Workstation Name: [facility]    [facility]        Results for orders placed during the hospital encounter of 10/06/20   Transthoracic Echo Complete With Contrast if Necessary Per Protocol    Addendum · The right heart is enlarged. Left heart chamber sizes are normal. · There is severe tricuspid regurgitation. Septal motion (ventricular)  suggests an RV volume overload. · The atrial septum is abnormal. A septal \"patch\" is suggested. There is  no evidence of shunting by agitated saline study. · The estimated PA pressure is normal to slightly elevated. The estimated  RA pressure is elevated. · There is moderate concentric left ventricular hypertrophy. · Global and segmental LV wall motion is normal with an estimated LV  ejection fraction of 56%-60%. Estimated RV systolic function is normal. · There are fibrocalcific changes in the aortic valve. The valve is  functionally normal. · Calcium is noted in the mitral annulus and leaflets. Mild mitral  regurgitation is present. · There are no other important findings on this study.        Irvin Mccray MD 10/9/2020  7:37 PM     · The right heart is  enlarged. Left heart chamber sizes are normal. · There is severe tricuspid regurgitation. Septal motion (ventricular)  suggests an RV volume overload. · The atrial septum is abnormal. A septal \"patch\" is suggested. There is  no evidence of shunting by agitated saline study. · The estimated PA pressure is normal to slightly elevated. The estimated  RA pressure is elevated. · There is moderate concentric left ventricular hypertrophy. · Global and segmental LV wall motion is " "normal with an estimated LV  ejection fraction of 56%-60%. Estimated RV systolic function is normal. · There are fibrocalcific changes in the aortic valve. The valve is  functionally normal. · Calcium is noted in the mitral annulus and leaflets. Mild mitral  regurgitation is present. · There are no other important findings on this study. · Left ventricular ejection fraction appears to be 56 - 60%. · Left ventricular wall thickness is consistent with concentric  hypertrophy.        Irvin Mccray MD 10/6/2020  6:07 PM          Narrative · The right heart is enlarged. Left heart chamber sizes are normal.  · There is severe tricuspid regurgitation. Septal motion (ventricular)   suggests an RV volume overload.  · The atrial septum is abnormal. A septal \"patch\" is suggested. There is   no evidence of shunting by agitated saline study.  · The estimated PA pressure is normal to slightly elevated. The estimated   RA pressure is elevated.  · There is moderate concentric left ventricular hypertrophy.  · Global and segmental LV wall motion is normal with an estimated LV   ejection fraction of 56%-60%.  · There are fibrocalcific changes in the aortic valve. The valve is   functionally normal.  · Calcium is noted in the mitral annulus and leaflets. Mild mitral   regurgitation is present.  · There are no other important findings on this study.        Discharge Details        Discharge Medications      Continue These Medications      Instructions Start Date   aspirin 81 MG EC tablet   81 mg, Oral, Daily      furosemide 20 MG tablet  Commonly known as: LASIX   20 mg, Oral, 2 Times Daily      insulin glargine 100 UNIT/ML injection  Commonly known as: LANTUS   50 Units, Subcutaneous, Daily      insulin regular 100 UNIT/ML injection  Commonly known as: humuLIN R,novoLIN R   Subcutaneous, 3 Times Daily Before Meals, Pt uses sliding scale       lisinopril 10 MG tablet  Commonly known as: PRINIVIL,ZESTRIL   10 mg, Oral, Daily    "   rivaroxaban 20 MG tablet  Commonly known as: XARELTO   20 mg, Oral, Daily           Allergies   Allergen Reactions   • Statins Myalgia     Discharge Disposition:  Home or Self Care    Diet:  Diet Instructions     Diet: Consistent Carbohydrate, Cardiac; Thin      Discharge Diet:  Consistent Carbohydrate  Cardiac       Fluid Consistency: Thin    Fluid Restriction per day: 1500 mL Fluid        Activity:  Activity Instructions     Activity as Tolerated          CODE STATUS:    Code Status and Medical Interventions:   Ordered at: 10/06/20 0410     Level Of Support Discussed With:    Patient     Code Status:    CPR     Medical Interventions (Level of Support Prior to Arrest):    Full     No future appointments.    Virgie Saucedo PA-C  10/12/20    Time Spent on Discharge:  I spent 40 minutes on this discharge activity which included: face-to-face encounter with the patient, reviewing the data in the system, coordination of the care with the nursing staff as well as consultants, documentation, and entering orders.

## 2020-10-13 ENCOUNTER — READMISSION MANAGEMENT (OUTPATIENT)
Dept: CALL CENTER | Facility: HOSPITAL | Age: 75
End: 2020-10-13

## 2020-10-13 LAB — SARS-COV-2 RNA RESP QL NAA+PROBE: NOT DETECTED

## 2020-10-14 NOTE — OUTREACH NOTE
Prep Survey      Responses   [facility] facility patient discharged from?  [place]   Is LACE score < 7 ?  No   Eligibility  Readm Mgmt   Discharge diagnosis  UTI   Does the patient have one of the following disease processes/diagnoses(primary or secondary)?  Other   Does the patient have Home health ordered?  No   Is there a DME ordered?  No   Prep survey completed?  Yes          Shania Burch RN

## 2020-10-15 ENCOUNTER — READMISSION MANAGEMENT (OUTPATIENT)
Dept: CALL CENTER | Facility: HOSPITAL | Age: 75
End: 2020-10-15

## 2020-10-15 ENCOUNTER — HOSPITAL ENCOUNTER (OUTPATIENT)
Facility: HOSPITAL | Age: 75
Discharge: HOME | End: 2020-10-15
Attending: INTERNAL MEDICINE | Admitting: INTERNAL MEDICINE

## 2020-10-15 VITALS
BODY MASS INDEX: 28.27 KG/M2 | TEMPERATURE: 97 F | HEIGHT: 68 IN | HEART RATE: 103 BPM | WEIGHT: 186.51 LBS | SYSTOLIC BLOOD PRESSURE: 137 MMHG | OXYGEN SATURATION: 100 % | RESPIRATION RATE: 15 BRPM | DIASTOLIC BLOOD PRESSURE: 80 MMHG

## 2020-10-15 DIAGNOSIS — I48.92 ATRIAL FLUTTER, UNSPECIFIED TYPE (HCC): ICD-10-CM

## 2020-10-15 DIAGNOSIS — I48.3 TYPICAL ATRIAL FLUTTER (HCC): ICD-10-CM

## 2020-10-15 LAB
DEPRECATED RDW RBC AUTO: 49.4 FL (ref 37–54)
ERYTHROCYTE [DISTWIDTH] IN BLOOD BY AUTOMATED COUNT: 15.7 % (ref 12.3–15.4)
GLUCOSE BLDC GLUCOMTR-MCNC: 82 MG/DL (ref 70–130)
HCT VFR BLD AUTO: 33.2 % (ref 34–46.6)
HGB BLD-MCNC: 10 G/DL (ref 12–15.9)
MCH RBC QN AUTO: 26.2 PG (ref 26.6–33)
MCHC RBC AUTO-ENTMCNC: 30.1 G/DL (ref 31.5–35.7)
MCV RBC AUTO: 86.9 FL (ref 79–97)
PLATELET # BLD AUTO: 251 10*3/MM3 (ref 140–450)
PMV BLD AUTO: 10.2 FL (ref 6–12)
RBC # BLD AUTO: 3.82 10*6/MM3 (ref 3.77–5.28)
WBC # BLD AUTO: 5.47 10*3/MM3 (ref 3.4–10.8)

## 2020-10-15 PROCEDURE — C1732 CATH, EP, DIAG/ABL, 3D/VECT: HCPCS | Performed by: INTERNAL MEDICINE

## 2020-10-15 PROCEDURE — 93662 INTRACARDIAC ECG (ICE): CPT | Performed by: INTERNAL MEDICINE

## 2020-10-15 PROCEDURE — 99152 MOD SED SAME PHYS/QHP 5/>YRS: CPT | Performed by: INTERNAL MEDICINE

## 2020-10-15 PROCEDURE — 25010000002 FENTANYL CITRATE (PF) 100 MCG/2ML SOLUTION: Performed by: INTERNAL MEDICINE

## 2020-10-15 PROCEDURE — 93005 ELECTROCARDIOGRAM TRACING: CPT | Performed by: INTERNAL MEDICINE

## 2020-10-15 PROCEDURE — C1894 INTRO/SHEATH, NON-LASER: HCPCS | Performed by: INTERNAL MEDICINE

## 2020-10-15 PROCEDURE — 85027 COMPLETE CBC AUTOMATED: CPT | Performed by: INTERNAL MEDICINE

## 2020-10-15 PROCEDURE — 93655 ICAR CATH ABLTJ DSCRT ARRHYT: CPT | Performed by: INTERNAL MEDICINE

## 2020-10-15 PROCEDURE — 93010 ELECTROCARDIOGRAM REPORT: CPT | Performed by: INTERNAL MEDICINE

## 2020-10-15 PROCEDURE — 93623 PRGRMD STIMJ&PACG IV RX NFS: CPT | Performed by: INTERNAL MEDICINE

## 2020-10-15 PROCEDURE — C1730 CATH, EP, 19 OR FEW ELECT: HCPCS | Performed by: INTERNAL MEDICINE

## 2020-10-15 PROCEDURE — C1759 CATH, INTRA ECHOCARDIOGRAPHY: HCPCS | Performed by: INTERNAL MEDICINE

## 2020-10-15 PROCEDURE — 25010000002 MIDAZOLAM PER 1 MG: Performed by: INTERNAL MEDICINE

## 2020-10-15 PROCEDURE — 93621 COMP EP EVL L PAC&REC C SINS: CPT | Performed by: INTERNAL MEDICINE

## 2020-10-15 PROCEDURE — 93613 INTRACARDIAC EPHYS 3D MAPG: CPT | Performed by: INTERNAL MEDICINE

## 2020-10-15 PROCEDURE — 82962 GLUCOSE BLOOD TEST: CPT

## 2020-10-15 PROCEDURE — 93653 COMPRE EP EVAL TX SVT: CPT | Performed by: INTERNAL MEDICINE

## 2020-10-15 PROCEDURE — 25010000002 ADENOSINE PER 6 MG: Performed by: INTERNAL MEDICINE

## 2020-10-15 PROCEDURE — 99153 MOD SED SAME PHYS/QHP EA: CPT | Performed by: INTERNAL MEDICINE

## 2020-10-15 PROCEDURE — C1766 INTRO/SHEATH,STRBLE,NON-PEEL: HCPCS | Performed by: INTERNAL MEDICINE

## 2020-10-15 RX ORDER — SODIUM CHLORIDE 0.9 % (FLUSH) 0.9 %
10 SYRINGE (ML) INJECTION AS NEEDED
Status: DISCONTINUED | OUTPATIENT
Start: 2020-10-15 | End: 2020-10-15 | Stop reason: HOSPADM

## 2020-10-15 RX ORDER — ONDANSETRON 2 MG/ML
4 INJECTION INTRAMUSCULAR; INTRAVENOUS EVERY 6 HOURS PRN
Status: DISCONTINUED | OUTPATIENT
Start: 2020-10-15 | End: 2020-10-15 | Stop reason: HOSPADM

## 2020-10-15 RX ORDER — MIDAZOLAM HYDROCHLORIDE 1 MG/ML
INJECTION INTRAMUSCULAR; INTRAVENOUS AS NEEDED
Status: DISCONTINUED | OUTPATIENT
Start: 2020-10-15 | End: 2020-10-15 | Stop reason: HOSPADM

## 2020-10-15 RX ORDER — ACETAMINOPHEN 325 MG/1
650 TABLET ORAL EVERY 4 HOURS PRN
Status: DISCONTINUED | OUTPATIENT
Start: 2020-10-15 | End: 2020-10-15 | Stop reason: HOSPADM

## 2020-10-15 RX ORDER — SODIUM CHLORIDE 0.9 % (FLUSH) 0.9 %
3 SYRINGE (ML) INJECTION EVERY 12 HOURS SCHEDULED
Status: DISCONTINUED | OUTPATIENT
Start: 2020-10-15 | End: 2020-10-15 | Stop reason: HOSPADM

## 2020-10-15 RX ORDER — NITROGLYCERIN 0.4 MG/1
0.4 TABLET SUBLINGUAL
Status: DISCONTINUED | OUTPATIENT
Start: 2020-10-15 | End: 2020-10-15 | Stop reason: HOSPADM

## 2020-10-15 RX ORDER — KETOROLAC TROMETHAMINE 15 MG/ML
15 INJECTION, SOLUTION INTRAMUSCULAR; INTRAVENOUS EVERY 8 HOURS
Status: DISCONTINUED | OUTPATIENT
Start: 2020-10-15 | End: 2020-10-15 | Stop reason: HOSPADM

## 2020-10-15 RX ORDER — FENTANYL CITRATE 50 UG/ML
INJECTION, SOLUTION INTRAMUSCULAR; INTRAVENOUS AS NEEDED
Status: DISCONTINUED | OUTPATIENT
Start: 2020-10-15 | End: 2020-10-15 | Stop reason: HOSPADM

## 2020-10-15 RX ORDER — ADENOSINE 3 MG/ML
INJECTION, SOLUTION INTRAVENOUS AS NEEDED
Status: DISCONTINUED | OUTPATIENT
Start: 2020-10-15 | End: 2020-10-15 | Stop reason: HOSPADM

## 2020-10-15 RX ORDER — LIDOCAINE HYDROCHLORIDE 10 MG/ML
INJECTION, SOLUTION EPIDURAL; INFILTRATION; INTRACAUDAL; PERINEURAL AS NEEDED
Status: DISCONTINUED | OUTPATIENT
Start: 2020-10-15 | End: 2020-10-15 | Stop reason: HOSPADM

## 2020-10-15 RX ORDER — BUPIVACAINE HYDROCHLORIDE 5 MG/ML
INJECTION, SOLUTION PERINEURAL AS NEEDED
Status: DISCONTINUED | OUTPATIENT
Start: 2020-10-15 | End: 2020-10-15 | Stop reason: HOSPADM

## 2020-10-15 NOTE — OUTREACH NOTE
Medical Week 1 Survey      Responses   [facility] patient discharged from?  [place]   Does the patient have one of the following disease processes/diagnoses(primary or secondary)?  Other   Week 1 attempt successful?  No   Revoke  Readmitted          Virgie Noriega RN

## 2020-10-15 NOTE — OP NOTE
Cardiac Electrophysiology Procedure Note      [facility]     CATHETER ABLATION FOR ATRIAL FLUTTER (Typical CTI Dependent)    PROCEDURES PERFORMED:   · Catheter ablation of typical atrial flutter  · Catheter ablation of atypical right atrial flutter  · Full diagnostic EP study  · 3D electroanatomic mapping  · drug infusion / programmed pacing  · Left atrial pacing and recording  · Intracardiac echocardiography    PREPROCEDURAL DIAGNOSES:  · Typical Atrial Flutter  · ASD repair in 1990    POST PROCEDURE DIANGOSES:  As above.    MODERATE SEDATION FOR PROCEDURE:    Moderate sedation was given during this procedure.    I supervised and directed Emperatriz Jarrett RN to administer this sedation.  This staff member also monitored the patient's hemodynamic and respiratory status and response to these medications.  Please see the full detailed procedure report generated by the electrophysiology laboratory staff.  The patient tolerated moderate sedation well.  There were no complications regarding sedation.  The total dose of fentanyl was 150 mcg and the total dose of midazolam was 4 mg.  The total dose of Brevital was 50 mg.  First sedation was administered at 08 32 and continued through 1037.    INDICATION FOR PROCEDURE:  Briefly, Ivory Carr is a 75 y.o. year old female with a history of ASD repair in 1990 via surgical approach, incessant atrial flutter with periods of significant rapid conduction.  We suspect that she may have a portion of tachycardia and his cardiomyopathy    ANTICOAGULATION STRATEGY PRIOR TO AND POST PROCEDURE: Rivaroxaban 20 mg at night. The last dose of anticoagulant was confirmed to have been taken last evening.      PT/INR:  No results found for: LABPROT, INR  PTT:  No results found for: APTT  CBC:   WBC   Date Value Ref Range Status   10/15/2020 5.47 3.40 - 10.80 10*3/mm3 Final     RBC   Date Value Ref Range Status   10/15/2020 3.82 3.77 - 5.28  "10*6/mm3 Final     Hemoglobin   Date Value Ref Range Status   10/15/2020 10.0 (L) 12.0 - 15.9 g/dL Final     Hematocrit   Date Value Ref Range Status   10/15/2020 33.2 (L) 34.0 - 46.6 % Final     MCV   Date Value Ref Range Status   10/15/2020 86.9 79.0 - 97.0 fL Final     RDW   Date Value Ref Range Status   10/15/2020 15.7 (H) 12.3 - 15.4 % Final     Platelets   Date Value Ref Range Status   10/15/2020 251 140 - 450 10*3/mm3 Final     BMP:   No results found for: NA, K, CL, CO2, BUN, CREATININE, LABCREA, EGFR, GLU, LABGLUC    Vital Signs: BP (!) 168/104   Pulse 64   Temp 97 °F (36.1 °C) (Tympanic)   Resp 15   Ht 172.7 cm (68\")   Wt 84.6 kg (186 lb 8.2 oz)   SpO2 100%   BMI 28.36 kg/m²      Admit Weight:  84.6 kg (186 lb 8.2 oz)  BMI: Body mass index is 28.36 kg/m².    PROCEDURE NARRATIVE:  The patient was able to give written informed consent after revisiting the key portions of the risk versus benefit profile of the procedure.  This discussion was framed by our lengthy conversations  (please see our detailed notes).  Patient verbalized strong understanding of this discussion and a strong desire to proceed with the procedure.  Please note that this detailed informed consent process utilized mutual and shared decision making process between all parties involved, principally the physician and patient, but also potentially with input from the patient's selected family and friends.    The patient was brought to the EP laboratory in the post absorptive state. The patient was then prepared and draped in a routing sterile fashion.  Seldinger access was obtained at the right common femoral vein with two venipunctures.  J tip wires were advanced into the vascular space.  Short 9, 8 Montserratian sheaths and a single of deflectable medium curve sheath were placed into the right common femoral vein and the inferior vena cava / right atrium in an over the wire fashion.    An 8 Montserratian decapolar electrophysiology catheter was " placed into the coronary sinus for left atrial pacing recording.  A MiFi 4 mm irrigated ablation catheter was placed into the right atrium right ventricle AV node his bundle position triangle of Bolton  do and used for pacing recording in all of the sites.the   This catheter was later used for catheter ablation along the cavo tricuspid isthmus.      Additionally we used a 64 electrode Bernard electrophysiology catheter for mapping in the superior vena cava, inferior vena cava tricuspid valve annulus coronary sinus Triangle of Bolton in the right atrium including right atrial appendage.    An 8 Montenegrin phased array intracardiac echocardiography probe was placed into the right atrium and right ventricle.  This was used to: a) exclude left atrial appendage thrombus, b) monitor the pericardial space for fluid, c) monitor catheter stability on the cavotricuspid isthmus, and d) ascertain CTI anatomy.    We used the [employer] 3 dimensional electroanatomic mapping system to reconstruct a rudimentary structure of the right atrium AV node his bundle position   in triangle of Bolton.    We confirmed that the mechanism of tachycardia was indeed typical right atrial flutter.  Atrial cycle length was 230 ms exactly.  This was performed by careful review of the P-wave morphology on the surface EKG.  This was consistent with typical counter-clockwise right atrial flutter.  Additionally reviewed the electrograms along the cavo tricuspid isthmus and found that these were exactly during atrial mid diastole.    Entrainment mapping also was performed and demonstrated a perfect post pacing interval along the cavo tricuspid isthmus indicating that this was the critical isthmus of the tachycardia circuit indeed the inner loop.  This entrainment mapping indicated that this was concealed entrainment.    We proceeded with catheter ablation.  Ablation was performed along the cavo tricuspid isthmus beginning at the ventricular aspect in extended to the  caval aspect of the cavo tricuspid isthmus.  Catheter ablation was performed with a maximum of 50 w power was performed along the isthmus until all electrograms were eliminated.  The patient's tachycardia terminated during catheter ablation.     A second atrial flutter ensued.  The cycle length was significantly different and was 260 ms.  P wave morphology was also distinctly different.  P wave morphology was positive in 2 3 and F+ in lead I and negative in lead V1.  Activation within the coronary sinus was concentric but subtly different.  Entrainment mapping revealed that the right atrial posterior free wall was part of the tachycardia circuit however the septal aspect of the cavotricuspid isthmus was not.  At this point we performed extensive activation mapping of the second tachycardia.  More than 10,000 activation points were obtained during tachycardia.  We studied both activation and voltage.  There was discrete area of scar at the posterior aspect of the right atrial free wall.  This is consistent with prior atriotomy.  Activation mapping was carefully reviewed and was consistent with a reentrant rotational pattern around an island of scar on the posterior lateral aspect of the right atrium around the atriotomy site.    Ablation was performed with 50 W power using local impedance technology.  We ablated from the central island of scar extending inferiorly to the inferior vena cava the tachycardia midway through abruptly terminated to sinus rhythm.  We continue to ablate connecting the center of the rotational pattern to the inferior vena cava.  I then extended from the superior vena cava down to our line thus creating an intercaval ablation line.    We then demonstrated that there was bidirectional block across both the CTI and the intercaval lines with differential pacing maneuvers.    Adenosine 12 mg was administered intravenously following ablation to test for other atrial and or ventricular arrhythmias.   " Programmed stimulation was performed in an attempt to induce other and additional arrhythmias.  No arrhythmias were induced during administration and washout.    Please note that at this point we performed a diagnostic EP study.  Please note that it is impossible for a diagnostic EP study during atrial arrhythmias in this case specifically atrial flutter.  This is why the EP study was performed after the ablation.    Data obtained from this is listed in the below table:    Initial Study    Isuprel Washout Study           drive train / burst extrastim        Rhythm          Atrial CL      R-R 697    Ventricular CL      AH 90    AVBCL      HV 55    AVNERP       drive train / burst extrastim   Slow Pway ERP      Rhythm     Fast Pway ERP      Atrial CL     VABCL conc? /  Dec?      Ventricular CL     VAERP      AVBCL 520    AERP      AVNERP 700 420   VERP      Slow Pway ERP     AP antegrade ERP      Fast Pway ERP     AP retrograde ERP      VABCL conc? /  Dec? VAD          VAERP    Final Study      AERP      drive train / burst extrastim    VERP     Rhythm      AP antegrade ERP     Atrial CL      AP retrograde ERP     Ventricular CL           AVBCL     Isuprel Dose =      AVNERP       drive train / burst extrastim   Slow Pway ERP      Rhythm     Fast Pway ERP      Atrial CL     VABCL conc? /  Dec?      Ventricular CL     VAERP      AVBCL     AERP      AVNERP     VERP      Slow Pway ERP     AP antegrade ERP      Fast Pway ERP     AP retrograde ERP      VABCL conc? /  Dec?           VAERP           AERP           VERP           AP antegrade ERP           AP retrograde ERP                         Catheters and sheaths were then removed from the body.    A hemostatic \"figure of eight\" suture was applied to the cutaneous and subcutaneous tissue overlying the vascular puncture site at the groin.  This suture is to be removed prior to the patient being discharged home.    The patient transferred to recovery " in stable condition.     COMPLICATIONS: none    EBL: minimal    RADIATION EXPOSURE:46 mGy over 6.9 minutes    KEY PROCEDURAL FINDINGS:    · Normal AV node and His-Purkinje system function.  · Typical right atrial flutter ablated successfully with bidirectional block.  · Atypical right atrial flutter specifically a atriotomy flutter with reentrant circuit contained to the posterior lateral right atrial free wall ablated successfully.  · Please note that the patient's fossa ovalis is intact and there is an adequate window for transseptal puncture should that be required.    POST PROCEDURAL PLAN:    ·  The patient will be observed with the usual recovery process  and then I anticipate that  the patient can likely be discharged home later today.  · Uninterrupted anticoagulation for not less than 30 days unless specially instructed otherwise by myself or another member of our EP physician team.  Please note that the patient and the patient’s family have been extensively counseled about this critical requirement and have agreed to comply.  · Medications were reconciled, and key changes in medications include: no changes  · The patient will be seen at our office in 3 months.  · Follow up with Dr. Mccray as well.

## 2020-10-15 NOTE — INTERVAL H&P NOTE
CC: Typical right atrial flutter    HPI update: Ms. Ivory Carr is a very pleasant 75-year-old white female who presents today for elective typical right atrial flutter ablation in treatment for her arrhythmia.  Patient was recently seen in evaluation by Dr. Mccray and Dr. Garcia on 9/24/2020 with options for continued treatment of her recurrent typical right atrial flutter outlined and reviewed with decision made to pursue ablation procedure for which she presents for today.  Upon evaluation today patient admits to continued symptoms of increased fatigue, shortness of breath, and dyspnea on minimal exertion.  Patient denies chest pain, palpitations, dizziness, presyncope, or syncope.  Patient admits compliance with Xarelto therapy without noted side effects or signs of bleeding.  Patient noted with history of ASD repair in 1990.  Patient noted with recent admission to [facility] after being seen in consultation on 9/24/2020 for abdominal pain/distention for which she reports being treated with daily laxatives and substantial improvement in her symptoms.  All risk, benefits, and alternatives to the procedure were outlined viewed with patient in detail with consultation again today.  Patient verbalized complete understanding of the procedure and is willing to proceed as scheduled.  All preprocedure lab evaluation reviewed and acceptable with noted chronic anemia.  Patient denies recent infections or signs of fever, chills, sweats, or cough.  Patient denies recent sick contacts.  Patient has a negative Covid screening documented within the last 72 hours.    ROS:  Constitutional: No fevers or chills, no recent weight gain or weight loss, + fatigue  Eyes: No visual loss, blurred vision, double vision, yellow sclerae.  ENT: No headaches, hearing loss, vertigo, congestion or sore throat.   Cardiovascular: Per HPI  Respiratory: No cough or wheezing, no sputum production, no hematemesis, + soa, mcfadden  "  Gastrointestinal: No abdominal pain, no nausea, vomiting, constipation, diarrhea, melena.   Genitourinary: No dysuria, hematuria or increased frequency.  Musculoskeletal:  No gait disturbance, weakness or joint pain or stiffness  Integumentary: No rashes, urticaria, ulcers or sores.   Neurological: No headache, dizziness, syncope, paralysis, ataxia, no prior CVA/TIA  Psychiatric: No anxiety, or depression  Endocrine: No diaphoresis, cold or heat intolerance. No polyuria or polydipsia.   Hematologic/Lymphatic: No anemia, abnormal bruising or bleeding. No history of DVT/PE.    /76 (BP Location: Left arm, Patient Position: Lying)   Pulse 64   Temp 97 °F (36.1 °C) (Tympanic)   Resp 16   Ht 172.7 cm (68\")   Wt 84.6 kg (186 lb 8.2 oz)   SpO2 99%   BMI 28.36 kg/m²     Telemetry: 2:1 atrial flutter 65 bpm    Lab Results   Component Value Date    WBC 5.47 10/15/2020    HGB 10.0 (L) 10/15/2020    HCT 33.2 (L) 10/15/2020    MCV 86.9 10/15/2020     10/15/2020     Lab Results   Component Value Date    GLUCOSE 144 (H) 10/12/2020    CALCIUM 9.0 10/12/2020     (L) 10/12/2020    K 4.2 10/12/2020    CO2 31.0 (H) 10/12/2020    CL 91 (L) 10/12/2020    BUN 20 10/12/2020    CREATININE 1.14 (H) 10/12/2020    EGFRIFNONA 46 (L) 10/12/2020    BCR 17.5 10/12/2020    ANIONGAP 9.0 10/12/2020       GEN: Well nourished, well-developed, no acute distress  HEENT: Normocephalic, atraumatic, moist mucous membranes  NECK: Supple, no JVD, no thyromegaly, no lymphadenopathy  CARD: Regular rate and rhythm, normal S1 & S2 are present.  No murmur, gallop or rubs are appreciated.  LUNGS: Clear to auscultation bilateraly, normal respiratory effort  ABDOMEN: Soft, nontender, normal bowel sounds  EXTREMITIES: No gross deformities, no clubbing, cyanosis.  No Edema   SKIN: Warm, dry  NEURO: No focal deficits, alert and oriented x 3  PSYCHIATRIC: Normal affect and mood, appropriate use of semantics and logic.          Electronically " signed by Elías Wooten, APRN, 10/15/20, 7:47 AM EDT.

## 2020-11-05 LAB
QT INTERVAL: 400 MS
QTC INTERVAL: 508 MS

## 2020-11-16 ENCOUNTER — HOSPITAL ENCOUNTER (OUTPATIENT)
Dept: CARDIOLOGY | Facility: HOSPITAL | Age: 75
Discharge: HOME | End: 2020-11-16

## 2020-11-16 ENCOUNTER — OFFICE VISIT (OUTPATIENT)
Dept: CARDIOLOGY | Facility: HOSPITAL | Age: 75
End: 2020-11-16

## 2020-11-16 VITALS
OXYGEN SATURATION: 98 % | HEIGHT: 68 IN | SYSTOLIC BLOOD PRESSURE: 166 MMHG | BODY MASS INDEX: 28.38 KG/M2 | WEIGHT: 187.25 LBS | HEART RATE: 95 BPM | TEMPERATURE: 97.8 F | DIASTOLIC BLOOD PRESSURE: 78 MMHG | RESPIRATION RATE: 18 BRPM

## 2020-11-16 DIAGNOSIS — I48.4 ATYPICAL ATRIAL FLUTTER (HCC): ICD-10-CM

## 2020-11-16 DIAGNOSIS — N18.31 STAGE 3A CHRONIC KIDNEY DISEASE (HCC): ICD-10-CM

## 2020-11-16 DIAGNOSIS — E78.2 MIXED HYPERLIPIDEMIA: ICD-10-CM

## 2020-11-16 DIAGNOSIS — I10 ESSENTIAL HYPERTENSION: ICD-10-CM

## 2020-11-16 DIAGNOSIS — I48.4 ATYPICAL ATRIAL FLUTTER (HCC): Primary | ICD-10-CM

## 2020-11-16 LAB
QT INTERVAL: 402 MS
QT INTERVAL: 404 MS
QTC INTERVAL: 507 MS
QTC INTERVAL: 513 MS

## 2020-11-16 PROCEDURE — 93005 ELECTROCARDIOGRAM TRACING: CPT | Performed by: NURSE PRACTITIONER

## 2020-11-16 PROCEDURE — 93010 ELECTROCARDIOGRAM REPORT: CPT | Performed by: INTERNAL MEDICINE

## 2020-11-16 PROCEDURE — 99214 OFFICE O/P EST MOD 30 MIN: CPT | Performed by: NURSE PRACTITIONER

## 2020-11-16 RX ORDER — LISINOPRIL 20 MG/1
20 TABLET ORAL DAILY
Qty: 30 TABLET | Refills: 11 | Status: SHIPPED | OUTPATIENT
Start: 2020-11-16 | End: 2022-07-15 | Stop reason: HOSPADM

## 2020-11-16 RX ORDER — HYDROCODONE BITARTRATE AND ACETAMINOPHEN 5; 325 MG/1; MG/1
TABLET ORAL AS NEEDED
COMMUNITY
Start: 2020-08-22 | End: 2021-01-18

## 2020-11-16 NOTE — PROGRESS NOTES
[facility]      11/16/2020         Ivory Carr  [address], [place] [address]  [unfilled]    1945    Christiane Peter APRN    Ivory Carr is a 75 y.o. female.      Subjective:     Chief Complaint:  Establish Care, Atrial Fibrillation, and Post-op (ABLATION)         HPI  75-year-old female presents the office today for ongoing evaluation of her atrial flutter.  Patient underwent a catheter ablation of typical atrial flutter and catheter ablation of atypical right atrial flutter per Dr. Garcia on 10/15/2020.  Patient still notes intermittent episodes of atrial flutter over the last few weeks.  She reports they usually occur when walking long distances.  She reports she is symptomatic and notes dyspnea and fatigue with those episodes.  Notes of blood pressure has been elevated over the last few weeks as well.  Notes compliance with her medications.  She is currently anticoagulated with Xarelto and denies any signs and symptoms of bleeding.  She also denies chest pain, presyncope syncope, orthopnea, PND or pedal edema.    Patient Active Problem List   Diagnosis   • Atrial flutter (CMS/HCC)   • Type 2 diabetes mellitus with hyperglycemia, with long-term current use of insulin (CMS/HCC)   • Anasarca and ascites   • Hyponatremia   • Anemia   • Stage 3a chronic kidney disease   • Severe tricuspid regurgitation   • History of repair of congenital atrial septal defect (ASD)       Past Medical History:   Diagnosis Date   • ASD (atrial septal defect)    • Atrial flutter (CMS/HCC) 9/24/2020    Added automatically from request for surgery [id number]   • CKD (chronic kidney disease) 10/6/2020   • Diabetes (CMS/HCC)    • Hypertension        Past Surgical History:   Procedure Laterality Date   • CARDIAC CATHETERIZATION N/A 10/9/2020    Procedure: Right Heart Cath;  Surgeon: Irvin Mccray MD;  Location: [facility] CATH INVASIVE LOCATION;  Service: Cardiology;  Laterality: N/A;    • CARDIAC ELECTROPHYSIOLOGY PROCEDURE N/A 10/15/2020    Procedure: Ablation atrial flutter;  Surgeon: Joseph Garcia DO;  Location: [facility] INVASIVE LOCATION;  Service: Cardiovascular;  Laterality: N/A;   • CHOLECYSTECTOMY         Family History   Problem Relation Age of Onset   • Hypertension Mother    • Hyperlipidemia Mother    • Diabetes Mother    • Emphysema Father    • Other Son         HEART PROBLEMS   • Diabetes Sister    • Other Brother         HEART PROBLEMS   • Lung disease Sister    • No Known Problems Sister    • Diabetes Sister    • Diabetes Sister    • Other Brother         HEART PROBLEMS   • Other Brother         HEART PROBLEMS   • Other Brother         HEART PROBLEMS   • Other Brother         HEART PROBLEMS       Social History     Socioeconomic History   • Marital status:      Spouse name: Not on file   • Number of children: Not on file   • Years of education: Not on file   • Highest education level: Not on file   Tobacco Use   • Smoking status: Never Smoker   • Smokeless tobacco: Never Used   Substance and Sexual Activity   • Alcohol use: Never     Frequency: Never   • Drug use: Never   • Sexual activity: Defer   Social History Narrative    CAFFEINE: COFFEE OCCASIONALLY        Allergies   Allergen Reactions   • Statins Myalgia         Current Outpatient Medications:   •  aspirin 81 MG EC tablet, Take 81 mg by mouth Daily., Disp: , Rfl:   •  furosemide (LASIX) 20 MG tablet, Take 20 mg by mouth 2 (Two) Times a Day., Disp: , Rfl:   •  HYDROcodone-acetaminophen (NORCO) 5-325 MG per tablet, Take  by mouth As Needed., Disp: , Rfl:   •  insulin glargine (LANTUS) 100 UNIT/ML injection, Inject 50 Units under the skin into the appropriate area as directed Daily., Disp: , Rfl:   •  insulin regular (humuLIN R,novoLIN R) 100 UNIT/ML injection, Inject  under the skin into the appropriate area as directed 3 (Three) Times a Day Before Meals. Pt uses sliding scale, Disp: , Rfl:   •  lisinopril  (PRINIVIL,ZESTRIL) 10 MG tablet, Take 10 mg by mouth Daily., Disp: , Rfl:   •  polyethylene glycol (MIRALAX) 17 g packet, Take 17 g by mouth Daily., Disp: 30 each, Rfl: 2  •  rivaroxaban (XARELTO) 20 MG tablet, Take 20 mg by mouth Daily., Disp: , Rfl:   •  sennosides-docusate (senna-docusate sodium) 8.6-50 MG per tablet, Take 2 tablets by mouth Every Night., Disp: 60 tablet, Rfl: 2    The following portions of the patient's history were reviewed today and updated as appropriate: allergies, current medications, past family history, past medical history, past social history, past surgical history and problem list     Review of Systems   Constitution: Positive for malaise/fatigue. Negative for chills, decreased appetite, diaphoresis, fever, night sweats, weight gain and weight loss.   HENT: Negative for congestion, hearing loss, hoarse voice and nosebleeds.    Eyes: Negative for blurred vision, visual disturbance and visual halos.   Cardiovascular: Positive for dyspnea on exertion (during aflutter episodes ). Negative for chest pain, claudication, cyanosis, irregular heartbeat, leg swelling, near-syncope, orthopnea, palpitations, paroxysmal nocturnal dyspnea and syncope.   Respiratory: Negative for cough, hemoptysis, shortness of breath, sleep disturbances due to breathing, snoring, sputum production and wheezing.    Hematologic/Lymphatic: Negative for bleeding problem. Does not bruise/bleed easily.   Skin: Negative for dry skin, itching and rash.   Musculoskeletal: Negative for arthritis, falls, joint pain, joint swelling and myalgias.   Gastrointestinal: Negative for bloating, abdominal pain, constipation, diarrhea, flatus, heartburn, hematemesis, hematochezia, melena, nausea and vomiting.   Genitourinary: Negative for dysuria, frequency, hematuria, nocturia and urgency.   Neurological: Negative for excessive daytime sleepiness, dizziness, headaches, light-headedness, loss of balance and weakness.  "  Psychiatric/Behavioral: Negative for depression. The patient does not have insomnia and is not nervous/anxious.        Objective:     Vitals:    11/16/20 1509 11/16/20 1510 11/16/20 1511   BP: (!) 188/84 165/84 166/78   BP Location: Right arm Left arm Left arm   Patient Position: Sitting Standing Sitting   Cuff Size: Adult Adult Adult   Pulse: 96 98 95   Resp:   18   Temp:   97.8 °F (36.6 °C)   TempSrc:   Temporal   SpO2: 97% 98% 98%   Weight:   84.9 kg (187 lb 4 oz)   Height:   172.7 cm (68\")       Body mass index is 28.47 kg/m².    Vitals signs and nursing note reviewed.   Constitutional:       General: Not in acute distress.     Appearance: Well-developed.   Eyes:      Conjunctiva/sclera: Conjunctivae normal.      Pupils: Pupils are equal, round, and reactive to light.   HENT:      Head: Normocephalic and atraumatic.   Neck:      Musculoskeletal: Normal range of motion and neck supple.      Thyroid: No thyromegaly.      Vascular: No JVD.      Trachea: No tracheal deviation.   Pulmonary:      Effort: Pulmonary effort is normal.      Breath sounds: Normal breath sounds.   Cardiovascular:      PMI at left midclavicular line. Normal rate. Regular rhythm. Normal S1. Normal S2.      Murmurs: There is no murmur.      No gallop. No click. No rub.   Pulses:     Intact distal pulses.   Edema:     Peripheral edema absent.   Abdominal:      General: Bowel sounds are normal. There is no distension.      Palpations: Abdomen is soft.      Tenderness: There is no abdominal tenderness.   Musculoskeletal: Normal range of motion.   Skin:     General: Skin is warm and dry.   Neurological:      Mental Status: Alert and oriented to person, place, and time.   Psychiatric:         Behavior: Behavior normal. Behavior is cooperative.         Lab and Diagnostic Review:    EKG today normal sinus rhythm at 96 bpm  /QTc 507    Assessment and Plan:   1. Atypical atrial flutter (CMS/HCC)  Brief episodes of atrial flutter post " ablation  CHADS-VASc Risk Assessment            5       Total Score        1 Hypertension    2 Age >/= 75    1 DM    1 Sex: Female        Criteria that do not apply:    CHF    PRIOR STROKE/TIA/THROMBO    Vascular Disease    Age 65-74        Anticoagulated with Xarelto and denies any signs and symptoms of bleeding  - ECG 12 Lead; Future  - ECG 12 Lead; Future    2. Essential hypertension  Increase lisinopril to 20 mg daily  HTN Education provided today including signs and symptoms, medication management, daily blood pressure monitoring. Patient encouraged to call the [facility] center with any abnormal readings.   3. Mixed hyperlipidemia  Diet controlled    4. Stage 3a chronic kidney disease  stable          It has been a pleasure to participate in the care of this patient.  Patient was instructed to call the [facility] with any questions, concerns, or worsening symptoms.    *Please note that portions of this note were completed with a voice recognition program. Efforts were made to edit the dictations, but occasionally words are mistranscribed.

## 2021-01-04 ENCOUNTER — INPATIENT HOSPITAL (OUTPATIENT)
Dept: RURAL HOSPITAL 5 | Facility: HOSPITAL | Age: 76
End: 2021-01-04

## 2021-01-04 DIAGNOSIS — K62.5 HEMORRHAGE OF ANUS AND RECTUM: ICD-10-CM

## 2021-01-04 DIAGNOSIS — K55.20 ANGIODYSPLASIA OF COLON WITHOUT HEMORRHAGE: ICD-10-CM

## 2021-01-04 DIAGNOSIS — K64.1 SECOND DEGREE HEMORRHOIDS: ICD-10-CM

## 2021-01-04 PROCEDURE — 45380 COLONOSCOPY AND BIOPSY: CPT | Performed by: INTERNAL MEDICINE

## 2021-01-18 ENCOUNTER — LAB (OUTPATIENT)
Dept: LAB | Facility: HOSPITAL | Age: 76
End: 2021-01-18

## 2021-01-18 ENCOUNTER — OFFICE VISIT (OUTPATIENT)
Dept: CARDIOLOGY | Facility: CLINIC | Age: 76
End: 2021-01-18

## 2021-01-18 VITALS
BODY MASS INDEX: 27.01 KG/M2 | HEART RATE: 88 BPM | OXYGEN SATURATION: 98 % | WEIGHT: 178.2 LBS | HEIGHT: 68 IN | SYSTOLIC BLOOD PRESSURE: 134 MMHG | DIASTOLIC BLOOD PRESSURE: 82 MMHG

## 2021-01-18 DIAGNOSIS — I10 ESSENTIAL HYPERTENSION: ICD-10-CM

## 2021-01-18 DIAGNOSIS — I48.3 TYPICAL ATRIAL FLUTTER (HCC): Primary | ICD-10-CM

## 2021-01-18 DIAGNOSIS — I48.3 TYPICAL ATRIAL FLUTTER (HCC): ICD-10-CM

## 2021-01-18 DIAGNOSIS — D64.9 ANEMIA, UNSPECIFIED TYPE: ICD-10-CM

## 2021-01-18 LAB
ANION GAP SERPL CALCULATED.3IONS-SCNC: 12.1 MMOL/L (ref 5–15)
BUN SERPL-MCNC: 18 MG/DL (ref 8–23)
BUN/CREAT SERPL: 17.3 (ref 7–25)
CALCIUM SPEC-SCNC: 9.9 MG/DL (ref 8.6–10.5)
CHLORIDE SERPL-SCNC: 92 MMOL/L (ref 98–107)
CO2 SERPL-SCNC: 31.9 MMOL/L (ref 22–29)
CREAT SERPL-MCNC: 1.04 MG/DL (ref 0.57–1)
DEPRECATED RDW RBC AUTO: 48.9 FL (ref 37–54)
ERYTHROCYTE [DISTWIDTH] IN BLOOD BY AUTOMATED COUNT: 15.5 % (ref 12.3–15.4)
GFR SERPL CREATININE-BSD FRML MDRD: 51 ML/MIN/1.73
GLUCOSE SERPL-MCNC: 175 MG/DL (ref 65–99)
HCT VFR BLD AUTO: 39.4 % (ref 34–46.6)
HGB BLD-MCNC: 13.2 G/DL (ref 12–15.9)
MCH RBC QN AUTO: 29.2 PG (ref 26.6–33)
MCHC RBC AUTO-ENTMCNC: 33.5 G/DL (ref 31.5–35.7)
MCV RBC AUTO: 87.2 FL (ref 79–97)
PLATELET # BLD AUTO: 274 10*3/MM3 (ref 140–450)
PMV BLD AUTO: 10.7 FL (ref 6–12)
POTASSIUM SERPL-SCNC: 4.4 MMOL/L (ref 3.5–5.2)
RBC # BLD AUTO: 4.52 10*6/MM3 (ref 3.77–5.28)
SODIUM SERPL-SCNC: 136 MMOL/L (ref 136–145)
WBC # BLD AUTO: 5.21 10*3/MM3 (ref 3.4–10.8)

## 2021-01-18 PROCEDURE — 93000 ELECTROCARDIOGRAM COMPLETE: CPT | Performed by: NURSE PRACTITIONER

## 2021-01-18 PROCEDURE — 36415 COLL VENOUS BLD VENIPUNCTURE: CPT

## 2021-01-18 PROCEDURE — 99214 OFFICE O/P EST MOD 30 MIN: CPT | Performed by: NURSE PRACTITIONER

## 2021-01-18 PROCEDURE — 80048 BASIC METABOLIC PNL TOTAL CA: CPT

## 2021-01-18 PROCEDURE — 85027 COMPLETE CBC AUTOMATED: CPT

## 2021-01-18 NOTE — PROGRESS NOTES
Cardiac Electrophysiology Outpatient Follow Up Note            [facility]    Follow Up Office Visit      Ivory Carr  [id number]  01/18/2021    Primary Care Physician: Christiane Peter APRN    Referred By: No ref. provider found    Subjective     Chief Complaint:   Diagnoses and all orders for this visit:    1. Typical atrial flutter (CMS/HCC) (Primary)  -     Cancel: Holter Monitor - 72 Hour Up To 21 Days; Future  -     Hemoglobin & Hematocrit, Blood; Future    2. Essential hypertension    3. Anemia, unspecified type    Other orders  -     ECG 12 Lead      Chief Complaint   Patient presents with   • Atypical atrial flutter     f/u     PROBLEM LIST:   1. Paroxysmal Atrial Flutter  a. CHADSVASc = 5  b. Diagnosed approximately 2017  c. ECV x3, last in March 2020  d. Echo 7/2020: biatrial enlargement, normal LVSF with abnormal septal motion;  moderately enlarged RV with normal contractility, mild MR, mod TR  e. Recurrent atrial flutter with RVR August and September 2020   f. EPS 10/15/2020 with catheter ablation of typical right atrial flutter  2. History of ASD                  A.  surgical ASD repair, 1990, [place]    B.  Echo 10/6/2020 Dr. Mccray:  EF 56-60%, Right heart enlargement with severe TR.    Septal motion suggests RV volume overload.  PA pressure normal to slightly   elevated.  Mild MR, aortic valve is functionally normal.  3. Essential Hypertension   4. Hyperlipidemia; intolerant to Lipitor   5. IDDM2 with peripheral neuropathy   6. Varicose veins   7. Lumbar spine disease  8. Cholecystitis/pancreatitis, s/p cholecystectomy August 2020   9. Anasarca and ascites      History of Present Illness:   Ivory Carr is a 75 y.o. female who presents to my electrophysiology clinic for scheduled 3-month follow-up status post catheter ablation for typical right atrial flutter.  Patient noted with discontinuation of Eliquis therapy 30 days following her  ablation appropriately.  Upon evaluation patient however reports concerning recurrence of palpitations intermittently approximately once every other week lasting several hours in duration and associated with increased fatigue and shortness of breath.  Patient denies chest pain, dizziness, presyncope, or syncope.  Patient also reports a GI bleed approximately 2 weeks ago requiring hospitalization with multiple blood transfusions.  Patient also reports that this is when her palpitations began occurring again and also reports that they have improved after receiving blood transfusion.    Review of Systems:   Constitutional: No fevers or chills, no recent weight gain or weight loss, + fatigue  Eyes: No visual loss, blurred vision, double vision, yellow sclerae.  ENT: No headaches, hearing loss, vertigo, congestion or sore throat.   Cardiovascular: Per HPI  Respiratory: No cough or wheezing, no sputum production, no hematemesis, + SOA  Gastrointestinal: No abdominal pain, no nausea, vomiting, constipation, diarrhea, melena.   Genitourinary: No dysuria, hematuria or increased frequency.  Musculoskeletal:  No gait disturbance, weakness or joint pain or stiffness  Integumentary: No rashes, urticaria, ulcers or sores.   Neurological: No headache, dizziness, syncope, paralysis, ataxia  Psychiatric: No anxiety, or depression  Endocrine: No diaphoresis, cold or heat intolerance. No polyuria or polydipsia.   Hematologic/Lymphatic: No anemia, abnormal bruising or bleeding.      Past Medical History:   Past Medical History:   Diagnosis Date   • ASD (atrial septal defect)    • Atrial flutter (CMS/HCC) 9/24/2020    Added automatically from request for surgery [id number]   • CKD (chronic kidney disease) 10/6/2020   • Diabetes (CMS/[facility])    • Hypertension        Past Surgical History:   Past Surgical History:   Procedure Laterality Date   • CARDIAC CATHETERIZATION N/A 10/9/2020    Procedure: Right Heart Cath;  Surgeon: Irvin Mccray,  MD;  Location:  [facility] CATH [facility]ASIVE [facility];  Service: Cardiology;  Laterality: N/A;   • CARDIAC ELECTROPHYSIOLOGY PROCEDURE N/A 10/15/2020    Procedure: Ablation atrial flutter;  Surgeon: Joseph Garcia DO;  Location:  [facility] EP INVASIVE LOCATION;  Service: Cardiovascular;  Laterality: N/A;   • CHOLECYSTECTOMY         Family History:   Family History   Problem Relation Age of Onset   • Hypertension Mother    • Hyperlipidemia Mother    • Diabetes Mother    • Emphysema Father    • Other Son         HEART PROBLEMS   • Diabetes Sister    • Other Brother         HEART PROBLEMS   • Lung disease Sister    • No Known Problems Sister    • Diabetes Sister    • Diabetes Sister    • Other Brother         HEART PROBLEMS   • Other Brother         HEART PROBLEMS   • Other Brother         HEART PROBLEMS   • Other Brother         HEART PROBLEMS       Social History:   Social History     Socioeconomic History   • Marital status:      Spouse name: Not on file   • Number of children: Not on file   • Years of education: Not on file   • Highest education level: Not on file   Tobacco Use   • Smoking status: Never Smoker   • Smokeless tobacco: Never Used   Substance and Sexual Activity   • Alcohol use: Never     Frequency: Never   • Drug use: Never   • Sexual activity: Defer   Social History Narrative    CAFFEINE: COFFEE OCCASIONALLY        Medications:     Current Outpatient Medications:   •  furosemide (LASIX) 20 MG tablet, Take 20 mg by mouth 2 (Two) Times a Day., Disp: , Rfl:   •  insulin glargine (LANTUS) 100 UNIT/ML injection, Inject 50 Units under the skin into the appropriate area as directed Daily., Disp: , Rfl:   •  insulin regular (humuLIN R,novoLIN R) 100 UNIT/ML injection, Inject  under the skin into the appropriate area as directed 3 (Three) Times a Day Before Meals. Pt uses sliding scale, Disp: , Rfl:   •  lisinopril (PRINIVIL,ZESTRIL) 20 MG tablet, Take 1 tablet by mouth Daily. (Patient taking differently: Take 40  "mg by mouth Daily.), Disp: 30 tablet, Rfl: 11  •  polyethylene glycol (MIRALAX) 17 g packet, Take 17 g by mouth Daily., Disp: 30 each, Rfl: 2  •  sennosides-docusate (senna-docusate sodium) 8.6-50 MG per tablet, Take 2 tablets by mouth Every Night., Disp: 60 tablet, Rfl: 2    Allergies:   Allergies   Allergen Reactions   • Statins Myalgia       Objective   Vital Signs:   Vitals:    01/18/21 1124   BP: 134/82   BP Location: Left arm   Patient Position: Sitting   Pulse: 88   SpO2: 98%   Weight: 80.8 kg (178 lb 3.2 oz)   Height: 172.7 cm (68\")       PHYSICAL EXAM  General appearance: Awake, alert, cooperative  Head: Normocephalic, without obvious abnormality, atraumatic  Eyes: Conjunctivae/corneas clear, EOMs intact  Neck: no adenopathy, no carotid bruit, no JVD and thyroid: not enlarged  Lungs: clear to auscultation bilaterally and no rhonchi or crackles\", ' symmetric  Heart: regular rate and rhythm, S1, S2 normal, no murmur, click, rub or gallop  Abdomen: Soft, non-tender, bowel sounds normal,  no organomegaly  Extremities: extremities normal, atraumatic, no cyanosis or edema  Skin: Skin color, turgor normal, no rashes or lesions  Neurologic: Grossly normal     Lab Results   Component Value Date    GLUCOSE 144 (H) 10/12/2020    CALCIUM 9.0 10/12/2020     (L) 10/12/2020    K 4.2 10/12/2020    CO2 31.0 (H) 10/12/2020    CL 91 (L) 10/12/2020    BUN 20 10/12/2020    CREATININE 1.14 (H) 10/12/2020    EGFRIFNONA 46 (L) 10/12/2020    BCR 17.5 10/12/2020    ANIONGAP 9.0 10/12/2020     Lab Results   Component Value Date    WBC 5.47 10/15/2020    HGB 10.0 (L) 10/15/2020    HCT 33.2 (L) 10/15/2020    MCV 86.9 10/15/2020     10/15/2020     Lab Results   Component Value Date    INR 2.20 (H) 10/06/2020    PROTIME 24.2 (H) 10/06/2020     Lab Results   Component Value Date    TSH 2.110 10/08/2020       ECG 12 Lead    Date/Time: 1/18/2021 12:30 PM  Performed by: Elías Wooten APRN  Authorized by: Elías Wooten " TEAGAN French   Comparison: compared with previous ECG from 11/16/2020  Rhythm: sinus rhythm  BPM: 84                Assessment & Plan    Diagnoses and all orders for this visit:    1. Typical atrial flutter (CMS/HCC) (Primary)  -     Cancel: Holter Monitor - 72 Hour Up To 21 Days; Future  -     Hemoglobin & Hematocrit, Blood; Future    2. Essential hypertension    3. Anemia, unspecified type    Other orders  -     ECG 12 Lead         Diagnosis Plan   1. Typical atrial flutter (CMS/HCC)   Patient reports that she feels significantly better following her ablation procedure with decreased palpitations, shortness of breath, and fatigue.  Patient still reports recurrent palpitations associated with fatigue occurring paroxysmally once every couple weeks recently lasting several hours in duration.  Patient noted with history of ASD repair and atypical right atrial flutter related to scar.     Case reviewed with Dr. Garcia in clinic today with plan to assess patient's H&H with noted improvement in her palpitations after recent blood transfusion.  If patient's palpitations recur will mail her a ZIO monitor for further evaluation at that time.  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     2.    Anemia  Serum H&H   3. Essential hypertension  Controlled in office today with reported control on current therapy at home.    Continue lisinopril 20 mg daily.       I spent 40 minutes in consultation with this patient which included more than 65% of this time in direct face-to-face counseling, physical examination and discussion of my assessment and findings and shared decision making with the patient.    Follow Up: Follow-up in 3 months for reevaluation      Thank you for allowing me to participate in the care of your patient. Please to not hesitate to contact me with additional questions or concerns.          Electronically signed by TEAGAN Tam, 01/18/21, 12:07 PM EST.

## 2021-04-19 ENCOUNTER — OFFICE VISIT (OUTPATIENT)
Dept: CARDIOLOGY | Facility: CLINIC | Age: 76
End: 2021-04-19

## 2021-04-19 VITALS
WEIGHT: 177 LBS | HEIGHT: 67 IN | SYSTOLIC BLOOD PRESSURE: 138 MMHG | BODY MASS INDEX: 27.78 KG/M2 | OXYGEN SATURATION: 97 % | TEMPERATURE: 97.1 F | DIASTOLIC BLOOD PRESSURE: 66 MMHG | HEART RATE: 95 BPM

## 2021-04-19 DIAGNOSIS — I10 ESSENTIAL HYPERTENSION: ICD-10-CM

## 2021-04-19 DIAGNOSIS — D50.0 IRON DEFICIENCY ANEMIA DUE TO CHRONIC BLOOD LOSS: ICD-10-CM

## 2021-04-19 DIAGNOSIS — I48.4 ATYPICAL ATRIAL FLUTTER (HCC): Primary | ICD-10-CM

## 2021-04-19 DIAGNOSIS — I48.3 TYPICAL ATRIAL FLUTTER (HCC): ICD-10-CM

## 2021-04-19 DIAGNOSIS — Z87.74 HISTORY OF REPAIR OF CONGENITAL ATRIAL SEPTAL DEFECT (ASD): ICD-10-CM

## 2021-04-19 PROCEDURE — 93000 ELECTROCARDIOGRAM COMPLETE: CPT | Performed by: INTERNAL MEDICINE

## 2021-04-19 PROCEDURE — 99214 OFFICE O/P EST MOD 30 MIN: CPT | Performed by: INTERNAL MEDICINE

## 2021-04-19 NOTE — PROGRESS NOTES
Cardiac Electrophysiology Outpatient Follow Up Note            [facility]    Follow Up Office Visit      Ivory Carr  [id number]  04/19/2021  [unfilled]  [unfilled]    Primary Care Physician: Christiane Peter APRN    Referred By: No ref. provider found    Subjective     Chief Complaint:   Diagnoses and all orders for this visit:    1. Atypical atrial flutter (CMS/HCC) (Primary)    2. Typical atrial flutter (CMS/HCC)    3. Essential hypertension    4. History of repair of congenital atrial septal defect (ASD)    5. Iron deficiency anemia due to chronic blood loss      Chief Complaint   Patient presents with   • Atrial Flutter   • ABLASION       History of Present Illness:   Ivory Carr is a 75 y.o. female who presents to my electrophysiology clinic for follow up of after ablation of typical and atypical right atrial flutter.  Jaye is doing well.  No recurrence of symptoms.  She feels a lot better.  I last saw her in January at that time she was severely anemic and has received blood transfusion.  Her hemoglobin since has been stable.  No identified source of bleeding or blood loss however she assures me that she feels great now.  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  No chest pain nausea vomit fevers or chills.         Review of Systems:   Constitutional: No fevers or chills, no recent weight gain or weight loss or fatigue  Eyes: No visual loss, blurred vision, double vision, yellow sclerae.  ENT: No headaches, hearing loss, vertigo, congestion or sore throat.   Cardiovascular: Per HPI  Respiratory: No cough or wheezing, no sputum production, no hematemesis   Gastrointestinal: No abdominal pain, no nausea, vomiting, constipation, diarrhea, melena.   Genitourinary: No dysuria, hematuria or increased frequency.  Musculoskeletal:  No gait disturbance, weakness or joint pain or stiffness  Integumentary: No rashes, urticaria, ulcers or sores.     Neurological: No headache, dizziness, syncope, paralysis, ataxia, no prior CVA/TIA  Psychiatric: No anxiety, or depression  Endocrine: No diaphoresis, cold or heat intolerance. No polyuria or polydipsia.   Hematologic/Lymphatic: No anemia, abnormal bruising or bleeding. No history of DVT/PE.      Past Medical History:   Past Medical History:   Diagnosis Date   • ASD (atrial septal defect)    • Atrial flutter (CMS/[facility]) 9/24/2020    Added automatically from request for surgery [id number]   • CKD (chronic kidney disease) 10/6/2020   • Diabetes (CMS/[facility])    • Hypertension        Past Surgical History:   Past Surgical History:   Procedure Laterality Date   • CARDIAC CATHETERIZATION N/A 10/9/2020    Procedure: Right Heart Cath;  Surgeon: Irvin Mccray MD;  Location:  [facility];  Service: Cardiology;  Laterality: N/A;   • CARDIAC ELECTROPHYSIOLOGY PROCEDURE N/A 10/15/2020    Procedure: Ablation atrial flutter;  Surgeon: Joseph Garcia DO;  Location:  [facility] EP INVASIVE LOCATION;  Service: Cardiovascular;  Laterality: N/A;   • CHOLECYSTECTOMY         Family History:   Family History   Problem Relation Age of Onset   • Hypertension Mother    • Hyperlipidemia Mother    • Diabetes Mother    • Emphysema Father    • Other Son         HEART PROBLEMS   • Diabetes Sister    • Other Brother         HEART PROBLEMS   • Lung disease Sister    • No Known Problems Sister    • Diabetes Sister    • Diabetes Sister    • Other Brother         HEART PROBLEMS   • Other Brother         HEART PROBLEMS   • Other Brother         HEART PROBLEMS   • Other Brother         HEART PROBLEMS       Social History:   Social History     Socioeconomic History   • Marital status:      Spouse name: Not on file   • Number of children: Not on file   • Years of education: Not on file   • Highest education level: Not on file   Tobacco Use   • Smoking status: Never Smoker   • Smokeless tobacco: Never Used   Substance and Sexual Activity  "  • Alcohol use: Never   • Drug use: Never   • Sexual activity: Defer       Medications:     Current Outpatient Medications:   •  furosemide (LASIX) 20 MG tablet, Take 20 mg by mouth 2 (Two) Times a Day., Disp: , Rfl:   •  insulin glargine (LANTUS) 100 UNIT/ML injection, Inject 50 Units under the skin into the appropriate area as directed Daily., Disp: , Rfl:   •  insulin regular (humuLIN R,novoLIN R) 100 UNIT/ML injection, Inject 10 Units under the skin into the appropriate area as directed 3 (Three) Times a Day Before Meals. Pt uses sliding scale , Disp: , Rfl:   •  lisinopril (PRINIVIL,ZESTRIL) 20 MG tablet, Take 1 tablet by mouth Daily. (Patient taking differently: Take 40 mg by mouth Daily.), Disp: 30 tablet, Rfl: 11  •  metoprolol tartrate (LOPRESSOR) 25 MG tablet, 25 mg Daily., Disp: , Rfl:   •  polyethylene glycol (MIRALAX) 17 g packet, Take 17 g by mouth Daily. (Patient taking differently: Take 17 g by mouth As Needed.), Disp: 30 each, Rfl: 2  •  sennosides-docusate (senna-docusate sodium) 8.6-50 MG per tablet, Take 2 tablets by mouth Every Night. (Patient taking differently: Take 2 tablets by mouth As Needed.), Disp: 60 tablet, Rfl: 2    Allergies:   Allergies   Allergen Reactions   • Statins Myalgia       Objective   Vital Signs:   Vitals:    04/19/21 1114   BP: 138/66   BP Location: Left arm   Patient Position: Sitting   Pulse: 95   Temp: 97.1 °F (36.2 °C)   SpO2: 97%   Weight: 80.3 kg (177 lb)   Height: 170.2 cm (67\")       PHYSICAL EXAM  General appearance: Awake, alert, cooperative  Head: Normocephalic, without obvious abnormality, atraumatic  Eyes: Conjunctivae/corneas clear, EOMs intact  Neck: no adenopathy, no carotid bruit, no JVD and thyroid: not enlarged  Lungs: clear to auscultation bilaterally and no rhonchi or crackles\", ' symmetric  Heart: regular rate and rhythm, S1, S2 normal, no murmur, click, rub or gallop  Abdomen: Soft, non-tender, bowel sounds normal,  no organomegaly  Extremities: " extremities normal, atraumatic, no cyanosis or edema  Skin: Skin color, turgor normal, no rashes or lesions  Neurologic: Grossly normal     Lab Results   Component Value Date    GLUCOSE 175 (H) 01/18/2021    CALCIUM 9.9 01/18/2021     01/18/2021    K 4.4 01/18/2021    CO2 31.9 (H) 01/18/2021    CL 92 (L) 01/18/2021    BUN 18 01/18/2021    CREATININE 1.04 (H) 01/18/2021    EGFRIFNONA 51 (L) 01/18/2021    BCR 17.3 01/18/2021    ANIONGAP 12.1 01/18/2021     Lab Results   Component Value Date    WBC 5.21 01/18/2021    HGB 13.2 01/18/2021    HCT 39.4 01/18/2021    MCV 87.2 01/18/2021     01/18/2021     Lab Results   Component Value Date    INR 2.20 (H) 10/06/2020    PROTIME 24.2 (H) 10/06/2020     Lab Results   Component Value Date    TSH 2.110 10/08/2020       Cardiac Testing:     I personally viewed and interpreted the patient's EKG/Telemetry/lab data      ECG 12 Lead    Date/Time: 4/19/2021 12:38 PM  Performed by: Joseph Garcia DO  Authorized by: Joseph Garcia DO   Comparison: compared with previous ECG   Similar to previous ECG  Rhythm: sinus rhythm            Tobacco Cessation: N/A  Obstructive Sleep Apnea Screening: Completed    Assessment & Plan    Diagnoses and all orders for this visit:    1. Atypical atrial flutter (CMS/HCC) (Primary)    2. Typical atrial flutter (CMS/HCC)    3. Essential hypertension    4. History of repair of congenital atrial septal defect (ASD)    5. Iron deficiency anemia due to chronic blood loss         Diagnosis Plan   1. Atypical atrial flutter (CMS/HCC)   right atrial atriotomy flutter status post ASD repair.  Catheter ablation performed in 2020.  Elimination of symptoms.  Has done quite well.  Extensive right atrial myopathy documented at that time.   2. Typical atrial flutter (CMS/HCC)   typical right atrial flutter cavotricuspid isthmus dependent.  Ablated successfully in 2020.  No recurrence.   3. Essential hypertension   pressure well controlled.  Continue center  file.   4. History of repair of congenital atrial septal defect (ASD)   remote ASD repair.  Doing well.  Interatrial septum is amenable to transseptal puncture if needed this was assessed by [facility].   5. Iron deficiency anemia due to chronic blood loss   treated.  Primary care following.     Body mass index is 27.72 kg/m².    Patient will come back and see me in as needed fashion as she lives very far away and does not drive.    I spent 28 minutes in consultation with this patient which included more than 65% of this time in direct face-to-face counseling, physical examination and discussion of my assessment and findings and shared decision making with the patient.  The remainder of the time not spent face to face was performing one, some or all of the following actions:  preparing to see this patient ( eg. Review of tests),  ordering medications, tests or procedures ), care coordination, discussion of the plan with other healthcare providers, documenting clinical information in Epic well as independently interpreting results and communicating results to patient, family and or caregiver.  All time noted occurred on the date of service.    Follow Up:       Thank you for allowing me to participate in the care of your patient. Please to not hesitate to contact me with additional questions or concerns.      Joseph Garcia, DO, FACC, RS  Cardiac Electrophysiologist  [facility]

## 2021-11-08 ENCOUNTER — OFFICE VISIT (OUTPATIENT)
Dept: CARDIOLOGY | Facility: CLINIC | Age: 76
End: 2021-11-08

## 2021-11-08 VITALS
OXYGEN SATURATION: 97 % | HEART RATE: 68 BPM | BODY MASS INDEX: 27.28 KG/M2 | DIASTOLIC BLOOD PRESSURE: 62 MMHG | WEIGHT: 180 LBS | HEIGHT: 68 IN | SYSTOLIC BLOOD PRESSURE: 128 MMHG

## 2021-11-08 DIAGNOSIS — D50.0 IRON DEFICIENCY ANEMIA DUE TO CHRONIC BLOOD LOSS: ICD-10-CM

## 2021-11-08 DIAGNOSIS — I20.8 ANGINA AT REST (HCC): ICD-10-CM

## 2021-11-08 DIAGNOSIS — I48.0 PAROXYSMAL ATRIAL FIBRILLATION (HCC): ICD-10-CM

## 2021-11-08 DIAGNOSIS — I48.92 ATRIAL FLUTTER, UNSPECIFIED TYPE (HCC): Primary | ICD-10-CM

## 2021-11-08 DIAGNOSIS — I07.1 SEVERE TRICUSPID REGURGITATION: ICD-10-CM

## 2021-11-08 PROBLEM — R60.1 ANASARCA: Status: RESOLVED | Noted: 2020-10-06 | Resolved: 2021-11-08

## 2021-11-08 PROBLEM — E87.1 HYPONATREMIA: Status: RESOLVED | Noted: 2020-10-06 | Resolved: 2021-11-08

## 2021-11-08 PROBLEM — N18.9 CKD (CHRONIC KIDNEY DISEASE): Status: ACTIVE | Noted: 2020-10-06

## 2021-11-08 PROCEDURE — 93000 ELECTROCARDIOGRAM COMPLETE: CPT | Performed by: PHYSICIAN ASSISTANT

## 2021-11-08 PROCEDURE — 99212 OFFICE O/P EST SF 10 MIN: CPT | Performed by: PHYSICIAN ASSISTANT

## 2021-11-08 RX ORDER — EMPAGLIFLOZIN 25 MG/1
1 TABLET, FILM COATED ORAL DAILY
COMMUNITY
Start: 2021-09-01

## 2021-11-08 RX ORDER — HUMAN INSULIN 100 [USP'U]/ML
20 INJECTION, SUSPENSION SUBCUTANEOUS 3 TIMES DAILY
COMMUNITY
Start: 2021-10-04

## 2021-11-08 NOTE — PROGRESS NOTES
"        Encounter Date:11/08/2021      Patient ID: Ivory Carr is a 76 y.o. female.    Christiane Peter APRN    Chief Complaint: Atypical atrial flutter (CMS/HCC)      PROBLEM LIST:  Patient Active Problem List    Diagnosis Date Noted   • Paroxysmal atrial fibrillation (HCC) 11/08/2021     Priority: High   • Typical atrial flutter (HCC) 04/19/2021     Priority: High     Note Last Updated: 11/8/2021     ·  catheter ablation of typical atrial flutter and atypical flutter, 10/15/2020     • Essential hypertension 01/18/2021     Priority: High   • Atrial flutter (HCC) 09/24/2020     Priority: High     Note Last Updated: 11/8/2021     a. CHADSVASc = 5  b. Diagnosed approximately 2017  c. ECV x3, last in March 2020  d. Echo 7/2020: biatrial enlargement, normal LVSF with abnormal septal motion;  moderately enlarged RV with normal contractility, mild MR, mod TR  e. Recurrent atrial flutter with RVR August and September 2020   f. EPS 10/15/2020 with catheter ablation of typical right atrial flutter     • ASD (atrial septal defect)      Priority: Medium     Note Last Updated: 11/8/2021     g. Surgical ASD repair, 1990, [place]  h. Echo 10/6/2020 Dr. Mccray:  EF 56-60%, Right heart enlargement with severe TR. Septal motion suggests RV volume overload. Septal \"patch\" with no shunting by agitated saline. PA pressure normal to slightly elevated, RA pressure elevated. Mild MR, aortic valve is functionally normal.       • Severe tricuspid regurgitation 10/07/2020     Priority: Medium     Note Last Updated: 11/8/2021     · Echo 10-6-20: There is severe tricuspid regurgitation. Septal motion (ventricular) suggests an RV volume overload.     • Diabetes (HCC) 10/06/2020   • Anemia 10/06/2020   • Stage 3a chronic kidney disease (HCC) 10/06/2020               History of Present Illness  Patient presents today for follow-up with a history of RFA of typical and atypical atrial flutter 1 year ago.  She returns today for scheduled follow-up. " " She states she is \"not doing well\".  She reports having had an episode of moderate to severe chest pain about 2 weeks ago which was relieved with 2 sublingual nitroglycerin.  Since then she has had exertional dyspnea and complains of her \"heart beating in her stomach\".  Her mobility is severely limited due to exertional dyspnea.  She has had no dizziness or syncope.  Her blood pressure at home is typically about 130 mmHg systolic and her heart rate is typically 60 to 90 bpm.  She has occasional awareness of irregular heart rate.  Her  checks her blood heart rate and has noticed a few episodes of irregular rhythm.  She underwent right heart catheterization last year which showed mild pulmonary hypertension.  I can find no history of recent ischemic study.  She has no known history of TIA or CVA and no history of CHF.    Allergies   Allergen Reactions   • Statins Myalgia       Current Outpatient Medications   Medication Instructions   • furosemide (LASIX) 40 mg, Oral, Daily   • insulin glargine (LANTUS, SEMGLEE) 50 Units, Subcutaneous, Daily   • insulin regular (HUMULIN R,NOVOLIN R) 10 Units, Subcutaneous, 3 Times Daily Before Meals, Pt uses sliding scale   • Jardiance 25 MG tablet tablet 1 tablet, Oral, Daily   • lisinopril (PRINIVIL,ZESTRIL) 20 mg, Oral, Daily   • metoprolol tartrate (LOPRESSOR) 25 mg, Daily   • NovoLIN 70/30 (70-30) 100 UNIT/ML injection 20 Units, Subcutaneous, 3 Times Daily   • polyethylene glycol (MIRALAX) 17 g, Oral, Daily   • sennosides-docusate (senna-docusate sodium) 8.6-50 MG per tablet 2 tablets, Oral, Nightly       .    Objective:     /62 (BP Location: Left arm, Patient Position: Sitting, Cuff Size: Adult)   Pulse 68   Ht 171.5 cm (67.5\")   Wt 81.6 kg (180 lb)   SpO2 97%   BMI 27.78 kg/m²    Body mass index is 27.78 kg/m².     Vitals reviewed.   Constitutional:       Appearance: Well-developed.   Pulmonary:      Effort: Pulmonary effort is normal. No respiratory " distress.      Breath sounds: Normal breath sounds. No wheezing. No rales.      Comments: Bases clear  Chest:      Chest wall: Not tender to palpatation.   Cardiovascular:      Normal rate. Irregular rhythm.      Murmurs: There is no murmur.      No gallop. No click. No rub.   Pulses:     Intact distal pulses.   Edema:     Peripheral edema absent.   Musculoskeletal: Normal range of motion.       Lab Review:                     CHADS-VASc Risk Assessment            5 Total Score    1 Hypertension    2 Age >/= 75    1 DM    1 Sex: Female        Criteria that do not apply:    CHF    PRIOR STROKE/TIA/THROMBO    Vascular Disease    Age 65-74                                            HAS-Bled Score for Major Bleeding Risk       Question  Yes  No    1. Hypertension History - BP Systolic >160 mmHg, no treatment 1    2.   Renal Disease - Creatinine higher than 2.6 mg/dL (200mmol/L) 1    3.   Liver Disease - Bilirubin higher 2x or AST/ALT/AP higher 3x normal  1   4.   Stroke History   1   5.   History of major bleeding or predisposition   1   6.   Labile INR - unstable/high INRs, time in Therapeutic Range <60%  1   7.   Age > 65  1    8.   Under medication that predisposes to bleeding   1   9.   Alcohol or drug usage history - more than 7 drinks per week  1            Score Major bleeding risk   0 1.10%   1 3.40%   2 4.10%   3 5.80%   4 8.90%   5 9.10%   >6 higher %             ECG 12 Lead    Date/Time: 11/8/2021 11:10 AM  Performed by: Walter Edwards PA  Authorized by: Walter Edwards PA   Comparison: compared with previous ECG from 4/19/2021  Comparison to previous ECG: EKG 4/19/2021 showed sinus rhythm  Rhythm: atrial fibrillation  Rate: normal  BPM: 73  ST Segments: ST segments normal  QRS axis: normal    Clinical impression: abnormal EKG  Comments:  ms.  QT /546 ms                       Assessment:      Diagnosis Plan   1. Atrial flutter, unspecified type (HCC)   no known recurrence after flutter  ablation   2. Severe tricuspid regurgitation   mild pulmonary hypertension   3. Paroxysmal atrial fibrillation (HCC)   Xarelto 20 mg daily    Cardioversion External in Cardiology Department   4. Iron deficiency anemia due to chronic blood loss   consider watchman device in the future if possible   5. Angina at rest (HCC)  Stress Test With Myocardial Perfusion (1 Day)     Plan:     Stable cardiac status.  Continue current medications.    Thank you for allowing us to participate in the care of your patient.     Electronically signed by JANNA Vasquez, 11/08/21, 11:17 AM EST.      Please note that portions of this note may have been completed with a voice recognition program. Efforts were made to edit the dictations, but occasionally words are mis-translated.

## 2021-11-11 ENCOUNTER — PREP FOR SURGERY (OUTPATIENT)
Dept: OTHER | Facility: HOSPITAL | Age: 76
End: 2021-11-11

## 2021-11-11 DIAGNOSIS — I48.92 ATRIAL FLUTTER, UNSPECIFIED TYPE (HCC): Primary | ICD-10-CM

## 2021-11-11 RX ORDER — SODIUM CHLORIDE 0.9 % (FLUSH) 0.9 %
10 SYRINGE (ML) INJECTION AS NEEDED
Status: CANCELLED | OUTPATIENT
Start: 2021-11-11

## 2021-11-11 RX ORDER — ONDANSETRON 2 MG/ML
4 INJECTION INTRAMUSCULAR; INTRAVENOUS EVERY 6 HOURS PRN
Status: CANCELLED | OUTPATIENT
Start: 2021-11-11

## 2021-11-11 RX ORDER — SODIUM CHLORIDE 0.9 % (FLUSH) 0.9 %
3 SYRINGE (ML) INJECTION EVERY 12 HOURS SCHEDULED
Status: CANCELLED | OUTPATIENT
Start: 2021-11-11

## 2021-11-11 RX ORDER — NITROGLYCERIN 0.4 MG/1
0.4 TABLET SUBLINGUAL
Status: CANCELLED | OUTPATIENT
Start: 2021-11-11

## 2021-12-16 ENCOUNTER — HOSPITAL ENCOUNTER (OUTPATIENT)
Dept: CARDIOLOGY | Facility: HOSPITAL | Age: 76
End: 2021-12-16

## 2021-12-20 ENCOUNTER — HOSPITAL ENCOUNTER (OUTPATIENT)
Dept: CARDIOLOGY | Facility: HOSPITAL | Age: 76
Discharge: HOME | End: 2021-12-20
Admitting: PHYSICIAN ASSISTANT

## 2021-12-20 DIAGNOSIS — I20.8 ANGINA AT REST (HCC): ICD-10-CM

## 2021-12-20 LAB
BH CV NUCLEAR PRIOR STUDY: 3
BH CV REST NUCLEAR ISOTOPE DOSE: 9.5 MCI
BH CV STRESS BP STAGE 1: NORMAL
BH CV STRESS BP STAGE 3: NORMAL
BH CV STRESS COMMENTS STAGE 1: NORMAL
BH CV STRESS DOSE REGADENOSON STAGE 1: 0.4
BH CV STRESS DURATION MIN STAGE 1: 1
BH CV STRESS DURATION MIN STAGE 2: 1
BH CV STRESS DURATION MIN STAGE 3: 1
BH CV STRESS DURATION MIN STAGE 4: 1
BH CV STRESS DURATION SEC STAGE 1: 10
BH CV STRESS DURATION SEC STAGE 2: 0
BH CV STRESS HR STAGE 1: 96
BH CV STRESS HR STAGE 2: 102
BH CV STRESS HR STAGE 3: 104
BH CV STRESS HR STAGE 4: 104
BH CV STRESS NUCLEAR ISOTOPE DOSE: 33 MCI
BH CV STRESS O2 STAGE 1: 98
BH CV STRESS O2 STAGE 2: 99
BH CV STRESS O2 STAGE 3: 97
BH CV STRESS O2 STAGE 4: 97
BH CV STRESS PROTOCOL 1: NORMAL
BH CV STRESS RECOVERY BP: NORMAL MMHG
BH CV STRESS RECOVERY HR: 106 BPM
BH CV STRESS RECOVERY O2: 98 %
BH CV STRESS STAGE 1: 1
BH CV STRESS STAGE 2: 2
BH CV STRESS STAGE 3: 3
BH CV STRESS STAGE 4: 4
LV EF NUC BP: 52 %
MAXIMAL PREDICTED HEART RATE: 142 BPM
PERCENT MAX PREDICTED HR: 80.99 %
STRESS BASELINE BP: NORMAL MMHG
STRESS BASELINE HR: 106 BPM
STRESS O2 SAT REST: 95 %
STRESS PERCENT HR: 95 %
STRESS POST ESTIMATED WORKLOAD: 1 METS
STRESS POST EXERCISE DUR MIN: 4 MIN
STRESS POST EXERCISE DUR SEC: 0 SEC
STRESS POST O2 SAT PEAK: 98 %
STRESS POST PEAK BP: NORMAL MMHG
STRESS POST PEAK HR: 115 BPM
STRESS TARGET HR: 121 BPM

## 2021-12-20 PROCEDURE — 78452 HT MUSCLE IMAGE SPECT MULT: CPT

## 2021-12-20 PROCEDURE — 78452 HT MUSCLE IMAGE SPECT MULT: CPT | Performed by: INTERNAL MEDICINE

## 2021-12-20 PROCEDURE — 93017 CV STRESS TEST TRACING ONLY: CPT

## 2021-12-20 PROCEDURE — A9500 TC99M SESTAMIBI: HCPCS | Performed by: PHYSICIAN ASSISTANT

## 2021-12-20 PROCEDURE — 0 TECHNETIUM SESTAMIBI: Performed by: PHYSICIAN ASSISTANT

## 2021-12-20 PROCEDURE — 93018 CV STRESS TEST I&R ONLY: CPT | Performed by: INTERNAL MEDICINE

## 2021-12-20 PROCEDURE — 25010000002 REGADENOSON 0.4 MG/5ML SOLUTION: Performed by: PHYSICIAN ASSISTANT

## 2021-12-20 RX ADMIN — REGADENOSON 0.4 MG: 0.08 INJECTION, SOLUTION INTRAVENOUS at 11:47

## 2021-12-20 RX ADMIN — TECHNETIUM TC 99M SESTAMIBI 1 DOSE: 1 INJECTION INTRAVENOUS at 11:50

## 2021-12-20 RX ADMIN — TECHNETIUM TC 99M SESTAMIBI 1 DOSE: 1 INJECTION INTRAVENOUS at 10:15

## 2021-12-21 PROBLEM — R07.2 PRECORDIAL PAIN: Status: ACTIVE | Noted: 2021-12-21

## 2021-12-22 ENCOUNTER — APPOINTMENT (OUTPATIENT)
Dept: CARDIOLOGY | Facility: HOSPITAL | Age: 76
End: 2021-12-22

## 2021-12-22 ENCOUNTER — HOSPITAL ENCOUNTER (OUTPATIENT)
Dept: CARDIOLOGY | Facility: HOSPITAL | Age: 76
Discharge: HOME | End: 2021-12-22
Attending: INTERNAL MEDICINE | Admitting: INTERNAL MEDICINE

## 2021-12-22 VITALS
HEART RATE: 94 BPM | RESPIRATION RATE: 17 BRPM | BODY MASS INDEX: 29.98 KG/M2 | OXYGEN SATURATION: 96 % | WEIGHT: 191 LBS | DIASTOLIC BLOOD PRESSURE: 86 MMHG | TEMPERATURE: 97.6 F | SYSTOLIC BLOOD PRESSURE: 166 MMHG | HEIGHT: 67 IN

## 2021-12-22 DIAGNOSIS — I48.92 ATRIAL FLUTTER, UNSPECIFIED TYPE (HCC): ICD-10-CM

## 2021-12-22 DIAGNOSIS — I48.0 PAROXYSMAL ATRIAL FIBRILLATION (HCC): ICD-10-CM

## 2021-12-22 LAB
ANION GAP SERPL CALCULATED.3IONS-SCNC: 13 MMOL/L (ref 5–15)
BUN SERPL-MCNC: 17 MG/DL (ref 8–23)
BUN/CREAT SERPL: 17.9 (ref 7–25)
CALCIUM SPEC-SCNC: 9.2 MG/DL (ref 8.6–10.5)
CHLORIDE SERPL-SCNC: 93 MMOL/L (ref 98–107)
CO2 SERPL-SCNC: 28 MMOL/L (ref 22–29)
CREAT SERPL-MCNC: 0.95 MG/DL (ref 0.57–1)
DEPRECATED RDW RBC AUTO: 40.8 FL (ref 37–54)
ERYTHROCYTE [DISTWIDTH] IN BLOOD BY AUTOMATED COUNT: 12.3 % (ref 12.3–15.4)
GFR SERPL CREATININE-BSD FRML MDRD: 57 ML/MIN/1.73
GLUCOSE SERPL-MCNC: 249 MG/DL (ref 65–99)
HCT VFR BLD AUTO: 36.4 % (ref 34–46.6)
HGB BLD-MCNC: 11.9 G/DL (ref 12–15.9)
MAGNESIUM SERPL-MCNC: 1.8 MG/DL (ref 1.6–2.4)
MCH RBC QN AUTO: 29.6 PG (ref 26.6–33)
MCHC RBC AUTO-ENTMCNC: 32.7 G/DL (ref 31.5–35.7)
MCV RBC AUTO: 90.5 FL (ref 79–97)
PLATELET # BLD AUTO: 278 10*3/MM3 (ref 140–450)
PMV BLD AUTO: 9.9 FL (ref 6–12)
POTASSIUM SERPL-SCNC: 4.2 MMOL/L (ref 3.5–5.2)
RBC # BLD AUTO: 4.02 10*6/MM3 (ref 3.77–5.28)
SARS-COV-2 RDRP RESP QL NAA+PROBE: NORMAL
SODIUM SERPL-SCNC: 134 MMOL/L (ref 136–145)
WBC NRBC COR # BLD: 5.81 10*3/MM3 (ref 3.4–10.8)

## 2021-12-22 PROCEDURE — 93321 DOPPLER ECHO F-UP/LMTD STD: CPT

## 2021-12-22 PROCEDURE — 93312 ECHO TRANSESOPHAGEAL: CPT

## 2021-12-22 PROCEDURE — 85027 COMPLETE CBC AUTOMATED: CPT | Performed by: INTERNAL MEDICINE

## 2021-12-22 PROCEDURE — S0260 H&P FOR SURGERY: HCPCS | Performed by: PHYSICIAN ASSISTANT

## 2021-12-22 PROCEDURE — 93325 DOPPLER ECHO COLOR FLOW MAPG: CPT | Performed by: INTERNAL MEDICINE

## 2021-12-22 PROCEDURE — 36415 COLL VENOUS BLD VENIPUNCTURE: CPT

## 2021-12-22 PROCEDURE — 93312 ECHO TRANSESOPHAGEAL: CPT | Performed by: INTERNAL MEDICINE

## 2021-12-22 PROCEDURE — 93321 DOPPLER ECHO F-UP/LMTD STD: CPT | Performed by: INTERNAL MEDICINE

## 2021-12-22 PROCEDURE — C9803 HOPD COVID-19 SPEC COLLECT: HCPCS

## 2021-12-22 PROCEDURE — 87635 SARS-COV-2 COVID-19 AMP PRB: CPT | Performed by: PHYSICIAN ASSISTANT

## 2021-12-22 PROCEDURE — 80048 BASIC METABOLIC PNL TOTAL CA: CPT | Performed by: INTERNAL MEDICINE

## 2021-12-22 PROCEDURE — 25010000002 SULFUR HEXAFLUORIDE MICROSPH 60.7-25 MG RECONSTITUTED SUSPENSION: Performed by: INTERNAL MEDICINE

## 2021-12-22 PROCEDURE — 92960 CARDIOVERSION ELECTRIC EXT: CPT | Performed by: INTERNAL MEDICINE

## 2021-12-22 PROCEDURE — 93005 ELECTROCARDIOGRAM TRACING: CPT | Performed by: INTERNAL MEDICINE

## 2021-12-22 PROCEDURE — 92960 CARDIOVERSION ELECTRIC EXT: CPT

## 2021-12-22 PROCEDURE — 83735 ASSAY OF MAGNESIUM: CPT | Performed by: INTERNAL MEDICINE

## 2021-12-22 PROCEDURE — 93325 DOPPLER ECHO COLOR FLOW MAPG: CPT

## 2021-12-22 PROCEDURE — 25010000002 FENTANYL CITRATE (PF) 50 MCG/ML SOLUTION: Performed by: INTERNAL MEDICINE

## 2021-12-22 PROCEDURE — 25010000002 MIDAZOLAM PER 1 MG: Performed by: INTERNAL MEDICINE

## 2021-12-22 RX ORDER — FLUMAZENIL 0.1 MG/ML
INJECTION INTRAVENOUS
Status: DISCONTINUED
Start: 2021-12-22 | End: 2021-12-22 | Stop reason: WASHOUT

## 2021-12-22 RX ORDER — MIDAZOLAM HYDROCHLORIDE 1 MG/ML
INJECTION INTRAMUSCULAR; INTRAVENOUS
Status: COMPLETED | OUTPATIENT
Start: 2021-12-22 | End: 2021-12-22

## 2021-12-22 RX ORDER — METOPROLOL TARTRATE 50 MG/1
50 TABLET, FILM COATED ORAL 2 TIMES DAILY
Qty: 60 TABLET | Refills: 6 | Status: ON HOLD | OUTPATIENT
Start: 2021-12-22 | End: 2022-07-15 | Stop reason: SDUPTHER

## 2021-12-22 RX ORDER — NALOXONE HYDROCHLORIDE 0.4 MG/ML
INJECTION, SOLUTION INTRAMUSCULAR; INTRAVENOUS; SUBCUTANEOUS
Status: DISCONTINUED
Start: 2021-12-22 | End: 2021-12-22 | Stop reason: WASHOUT

## 2021-12-22 RX ORDER — ONDANSETRON 2 MG/ML
4 INJECTION INTRAMUSCULAR; INTRAVENOUS EVERY 6 HOURS PRN
Status: DISCONTINUED | OUTPATIENT
Start: 2021-12-22 | End: 2021-12-22 | Stop reason: HOSPADM

## 2021-12-22 RX ORDER — NITROGLYCERIN 0.4 MG/1
0.4 TABLET SUBLINGUAL
Status: DISCONTINUED | OUTPATIENT
Start: 2021-12-22 | End: 2021-12-22 | Stop reason: HOSPADM

## 2021-12-22 RX ORDER — FENTANYL CITRATE 50 UG/ML
INJECTION, SOLUTION INTRAMUSCULAR; INTRAVENOUS
Status: DISCONTINUED
Start: 2021-12-22 | End: 2021-12-22 | Stop reason: HOSPADM

## 2021-12-22 RX ORDER — SODIUM CHLORIDE 0.9 % (FLUSH) 0.9 %
10 SYRINGE (ML) INJECTION AS NEEDED
Status: DISCONTINUED | OUTPATIENT
Start: 2021-12-22 | End: 2021-12-22 | Stop reason: HOSPADM

## 2021-12-22 RX ORDER — MIDAZOLAM HYDROCHLORIDE 1 MG/ML
INJECTION INTRAMUSCULAR; INTRAVENOUS
Status: DISCONTINUED
Start: 2021-12-22 | End: 2021-12-22 | Stop reason: HOSPADM

## 2021-12-22 RX ORDER — SODIUM CHLORIDE 0.9 % (FLUSH) 0.9 %
3 SYRINGE (ML) INJECTION EVERY 12 HOURS SCHEDULED
Status: DISCONTINUED | OUTPATIENT
Start: 2021-12-22 | End: 2021-12-22 | Stop reason: HOSPADM

## 2021-12-22 RX ORDER — FENTANYL CITRATE 50 UG/ML
INJECTION, SOLUTION INTRAMUSCULAR; INTRAVENOUS
Status: COMPLETED | OUTPATIENT
Start: 2021-12-22 | End: 2021-12-22

## 2021-12-22 RX ADMIN — FENTANYL CITRATE 50 MCG: 50 INJECTION, SOLUTION INTRAMUSCULAR; INTRAVENOUS at 13:30

## 2021-12-22 RX ADMIN — MIDAZOLAM 2 MG: 1 INJECTION INTRAMUSCULAR; INTRAVENOUS at 13:27

## 2021-12-22 RX ADMIN — SULFUR HEXAFLUORIDE 2 ML: KIT at 14:00

## 2021-12-22 NOTE — PROCEDURES
Diagnosis:  Atrial fibrillation    PROCEDURE PERFORMED: External electrical cardioversion.    Anesthesia: Sedation with:  1. 2 mg Versed  2. 60 mg Brevital  3.  50 MCG fentanyl    Estimated Blood Loss: Less than 10 mL     Specimens: None    PROCEDURE IN DETAIL: The patient was brought into the [facility] in a fasting  state. The patient was given moderate sedation during whichs he received 200  joules of external electrical cardioversion that converted atrial  fibrillation to normal sinus heart rhythm.  A BLANCA was performed prior to the procedure that showed no evidence of left atrial or left atrial appendage thrombus    IMPRESSION: Successful conversion of atrial fibrillation to normal sinus  heart rhythm.

## 2021-12-22 NOTE — H&P
"[facility] Electrophysiology    Date of Hospital Visit: 21    Place of Service: [facility]    Patient Name: Ivory Carr  :1945      Primary Care Provider: Christiane Peter APRN    Chief complaint/Reason for Consultation:  Atrial Fibrillation    Problem List:  Active Hospital Problems    Diagnosis    • Paroxysmal atrial fibrillation (HCC)    • Essential hypertension    • Atrial flutter (HCC)      a. CHADSVASc = 5  b. Diagnosed approximately   c. ECV x3, last in 2020  d. Echo 2020: biatrial enlargement, normal LVSF with abnormal septal motion;  moderately enlarged RV with normal contractility, mild MR, mod TR  e. Recurrent atrial flutter with RVR August and 2020   f. EPS 10/15/2020 with catheter ablation of typical right atrial flutter     • ASD (atrial septal defect)      g. Surgical ASD repair, , [place]  h. Echo 10/6/2020 Dr. Mccray:  EF 56-60%, Right heart enlargement with severe TR. Septal motion suggests RV volume overload. Septal \"patch\" with no shunting by agitated saline. PA pressure normal to slightly elevated, RA pressure elevated. Mild MR, aortic valve is functionally normal.       • Severe tricuspid regurgitation      · Echo 10-6-20: There is severe tricuspid regurgitation. Septal motion (ventricular) suggests an RV volume overload.     • Precordial pain      · MPS 21: Myocardial perfusion imaging indicates a normal myocardial perfusion study with no evidence of ischemia.     • Diabetes (HCC)    • Stage 3a chronic kidney disease (HCC)    • Anemia             History of Present Illness:  This is a 76-year-old female with known history of paroxysmal atrial fibrillation with a history of atrial flutter ablation.  She recently presented to our clinic for follow-up at which time she complained of chest pain and heart racing.  EKG that day showed rapid atrial fibrillation.  She was scheduled for an outpatient myocardial perfusion study " which showed no reversible ischemia and normal LV systolic function.  She presents today for ECV.    Allergies   Allergen Reactions   • Statins Myalgia       Current Outpatient Medications   Medication Instructions   • furosemide (LASIX) 40 mg, Oral, Daily   • insulin glargine (LANTUS, SEMGLEE) 50 Units, Subcutaneous, Daily   • insulin regular (HUMULIN R,NOVOLIN R) 10 Units, Subcutaneous, 3 Times Daily Before Meals, Pt uses sliding scale   • Jardiance 25 MG tablet tablet 1 tablet, Oral, Daily   • lisinopril (PRINIVIL,ZESTRIL) 20 mg, Oral, Daily   • metoprolol tartrate (LOPRESSOR) 25 mg, Daily   • NovoLIN 70/30 (70-30) 100 UNIT/ML injection 20 Units, Subcutaneous, 3 Times Daily   • polyethylene glycol (MIRALAX) 17 g, Oral, Daily   • rivaroxaban (XARELTO) 20 mg, Oral, Daily (has not taken for over 1 week)   • sennosides-docusate (senna-docusate sodium) 8.6-50 MG per tablet 2 tablets, Oral, Nightly           Social History     Socioeconomic History   • Marital status:    Tobacco Use   • Smoking status: Never Smoker   • Smokeless tobacco: Never Used   Substance and Sexual Activity   • Alcohol use: Never   • Drug use: Never   • Sexual activity: Defer       Family History   Problem Relation Age of Onset   • Hypertension Mother    • Hyperlipidemia Mother    • Diabetes Mother    • Emphysema Father    • Other Son         HEART PROBLEMS   • Diabetes Sister    • Other Brother         HEART PROBLEMS   • Lung disease Sister    • No Known Problems Sister    • Diabetes Sister    • Diabetes Sister    • Other Brother         HEART PROBLEMS   • Other Brother         HEART PROBLEMS   • Other Brother         HEART PROBLEMS   • Other Brother         HEART PROBLEMS       REVIEW OF SYSTEMS:   Review of Systems   Constitutional: Negative for diaphoresis, malaise/fatigue and weight gain.   Cardiovascular: Positive for dyspnea on exertion and palpitations. Negative for chest pain, claudication, irregular heartbeat, leg swelling,  "orthopnea, paroxysmal nocturnal dyspnea and syncope.   Respiratory: Negative for cough, shortness of breath, sleep disturbances due to breathing and wheezing.    Hematologic/Lymphatic: Negative for bleeding problem.   Musculoskeletal: Negative for muscle cramps, muscle weakness and myalgias.   Gastrointestinal: Negative for heartburn.   Neurological: Negative for weakness.            Objective:  Vitals:    12/22/21 1052 12/22/21 1057   BP: (!) 187/99 (!) 175/109   BP Location: Right arm Left arm   Patient Position: Lying Lying   Pulse:  99   Resp:  17   Temp:  97.6 °F (36.4 °C)   TempSrc:  Temporal   SpO2:  97%   Weight:  86.7 kg (191 lb 2.2 oz)   Height:  172.7 cm (68\")     Body mass index is 29.06 kg/m².      Vitals reviewed.   Constitutional:       Appearance: Well-developed.   Pulmonary:      Effort: Pulmonary effort is normal. No respiratory distress.      Breath sounds: Normal breath sounds. No wheezing. No rales.      Comments: Bases clear  Chest:      Chest wall: Not tender to palpatation.   Cardiovascular:      Normal rate. Regularly irregular rhythm.      Murmurs: There is no murmur.      No gallop. No click. No rub.   Pulses:     Intact distal pulses.   Edema:     Peripheral edema absent.   Musculoskeletal: Normal range of motion.           Lab Review:       Results from last 7 days   Lab Units 12/22/21  1118   SODIUM mmol/L 134*   POTASSIUM mmol/L 4.2   CHLORIDE mmol/L 93*   CO2 mmol/L 28.0   BUN mg/dL 17   CREATININE mg/dL 0.95   GLUCOSE mg/dL 249*   CALCIUM mg/dL 9.2     Results from last 7 days   Lab Units 12/22/21  1118   WBC 10*3/mm3 5.81   HEMOGLOBIN g/dL 11.9*   HEMATOCRIT % 36.4   PLATELETS 10*3/mm3 278     Estimated Creatinine Clearance: 56.2 mL/min (by C-G formula based on SCr of 0.95 mg/dL).        Assessment:   · Atrial fibrillation with RVR  · She has not taken her Xarelto for over 1 week.  I have discussed her options to include transesophageal echocardiogram to ensure she has not developed " atrial clots.  I explained the procedure to her and her  and they wish to proceed.        Plan:   · BLANCA and probable ECV        Electronically signed by JANNA Vasquez, 12/22/21, 10:49 AM EST.

## 2021-12-23 LAB
BH CV ECHO MEAS - AO ROOT DIAM: 2.8 CM
BH CV ECHO MEAS - BSA(HAYCOCK): 2.1 M^2
BH CV ECHO MEAS - BSA: 2 M^2
BH CV ECHO MEAS - BZI_BMI: 29 KILOGRAMS/M^2
BH CV ECHO MEAS - BZI_METRIC_HEIGHT: 172.7 CM
BH CV ECHO MEAS - BZI_METRIC_WEIGHT: 86.6 KG
BH CV ECHO MEAS - MV DEC SLOPE: 738.2 CM/SEC^2
BH CV ECHO MEAS - MV MAX PG: 15.9 MMHG
BH CV ECHO MEAS - MV MEAN PG: 8.2 MMHG
BH CV ECHO MEAS - MV P1/2T MAX VEL: 209.3 CM/SEC
BH CV ECHO MEAS - MV P1/2T: 83 MSEC
BH CV ECHO MEAS - MV V2 MAX: 199.4 CM/SEC
BH CV ECHO MEAS - MV V2 MEAN: 132.1 CM/SEC
BH CV ECHO MEAS - MV V2 VTI: 32.6 CM
BH CV ECHO MEAS - MVA P1/2T LCG: 1.1 CM^2
BH CV ECHO MEAS - MVA(P1/2T): 2.6 CM^2
MAXIMAL PREDICTED HEART RATE: 142 BPM
MAXIMAL PREDICTED HEART RATE: 142 BPM
STRESS TARGET HR: 121 BPM
STRESS TARGET HR: 121 BPM

## 2022-01-02 LAB
QT INTERVAL: 424 MS
QTC INTERVAL: 546 MS

## 2022-01-13 LAB
QT INTERVAL: 374 MS
QTC INTERVAL: 469 MS

## 2022-07-11 ENCOUNTER — APPOINTMENT (OUTPATIENT)
Dept: CT IMAGING | Facility: HOSPITAL | Age: 77
End: 2022-07-11

## 2022-07-11 ENCOUNTER — HOSPITAL ENCOUNTER (INPATIENT)
Facility: HOSPITAL | Age: 77
LOS: 4 days | Discharge: HOME | End: 2022-07-15
Attending: EMERGENCY MEDICINE | Admitting: HOSPITALIST

## 2022-07-11 ENCOUNTER — APPOINTMENT (OUTPATIENT)
Dept: GENERAL RADIOLOGY | Facility: HOSPITAL | Age: 77
End: 2022-07-11

## 2022-07-11 DIAGNOSIS — R60.9 PERIPHERAL EDEMA: ICD-10-CM

## 2022-07-11 DIAGNOSIS — I48.3 TYPICAL ATRIAL FLUTTER: Primary | ICD-10-CM

## 2022-07-11 DIAGNOSIS — R77.8 ELEVATED TROPONIN: ICD-10-CM

## 2022-07-11 DIAGNOSIS — E87.70 HYPERVOLEMIA, UNSPECIFIED HYPERVOLEMIA TYPE: ICD-10-CM

## 2022-07-11 DIAGNOSIS — I50.9 ACUTE ON CHRONIC CONGESTIVE HEART FAILURE, UNSPECIFIED HEART FAILURE TYPE: ICD-10-CM

## 2022-07-11 PROBLEM — J96.20 ACUTE ON CHRONIC RESPIRATORY FAILURE (HCC): Status: ACTIVE | Noted: 2022-07-11

## 2022-07-11 PROBLEM — R79.89 ELEVATED TROPONIN: Status: ACTIVE | Noted: 2022-07-11

## 2022-07-11 LAB
ALBUMIN SERPL-MCNC: 3.7 G/DL (ref 3.5–5.2)
ALBUMIN/GLOB SERPL: 1.1 G/DL
ALP SERPL-CCNC: 64 U/L (ref 39–117)
ALT SERPL W P-5'-P-CCNC: 13 U/L (ref 1–33)
ANION GAP SERPL CALCULATED.3IONS-SCNC: 12 MMOL/L (ref 5–15)
AST SERPL-CCNC: 21 U/L (ref 1–32)
BASOPHILS # BLD AUTO: 0.02 10*3/MM3 (ref 0–0.2)
BASOPHILS NFR BLD AUTO: 0.4 % (ref 0–1.5)
BILIRUB SERPL-MCNC: 0.4 MG/DL (ref 0–1.2)
BUN SERPL-MCNC: 28 MG/DL (ref 8–23)
BUN/CREAT SERPL: 19.4 (ref 7–25)
CALCIUM SPEC-SCNC: 8.8 MG/DL (ref 8.6–10.5)
CALCIUM SPEC-SCNC: 9.6 MG/DL (ref 8.6–10.5)
CHLORIDE SERPL-SCNC: 93 MMOL/L (ref 98–107)
CO2 SERPL-SCNC: 27 MMOL/L (ref 22–29)
CREAT SERPL-MCNC: 1.44 MG/DL (ref 0.57–1)
D DIMER PPP FEU-MCNC: 3.02 MCGFEU/ML (ref 0.01–0.5)
DEPRECATED RDW RBC AUTO: 46.9 FL (ref 37–54)
EGFRCR SERPLBLD CKD-EPI 2021: 37.3 ML/MIN/1.73
EOSINOPHIL # BLD AUTO: 0.05 10*3/MM3 (ref 0–0.4)
EOSINOPHIL NFR BLD AUTO: 1 % (ref 0.3–6.2)
ERYTHROCYTE [DISTWIDTH] IN BLOOD BY AUTOMATED COUNT: 15.2 % (ref 12.3–15.4)
FLUAV RNA RESP QL NAA+PROBE: NOT DETECTED
FLUBV RNA RESP QL NAA+PROBE: NOT DETECTED
GLOBULIN UR ELPH-MCNC: 3.3 GM/DL
GLUCOSE SERPL-MCNC: 237 MG/DL (ref 65–99)
HCT VFR BLD AUTO: 31 % (ref 34–46.6)
HGB BLD-MCNC: 9.9 G/DL (ref 12–15.9)
HOLD SPECIMEN: NORMAL
IMM GRANULOCYTES # BLD AUTO: 0.01 10*3/MM3 (ref 0–0.05)
IMM GRANULOCYTES NFR BLD AUTO: 0.2 % (ref 0–0.5)
INR PPP: 2.03 (ref 0.84–1.13)
LDH SERPL-CCNC: 283 U/L (ref 135–214)
LYMPHOCYTES # BLD AUTO: 1.4 10*3/MM3 (ref 0.7–3.1)
LYMPHOCYTES NFR BLD AUTO: 27.7 % (ref 19.6–45.3)
MAGNESIUM SERPL-MCNC: 2.1 MG/DL (ref 1.6–2.4)
MCH RBC QN AUTO: 27.1 PG (ref 26.6–33)
MCHC RBC AUTO-ENTMCNC: 31.9 G/DL (ref 31.5–35.7)
MCV RBC AUTO: 84.9 FL (ref 79–97)
MONOCYTES # BLD AUTO: 0.34 10*3/MM3 (ref 0.1–0.9)
MONOCYTES NFR BLD AUTO: 6.7 % (ref 5–12)
NEUTROPHILS NFR BLD AUTO: 3.23 10*3/MM3 (ref 1.7–7)
NEUTROPHILS NFR BLD AUTO: 64 % (ref 42.7–76)
NRBC BLD AUTO-RTO: 0 /100 WBC (ref 0–0.2)
NT-PROBNP SERPL-MCNC: 1114 PG/ML (ref 0–1800)
PLATELET # BLD AUTO: 207 10*3/MM3 (ref 140–450)
PMV BLD AUTO: 11 FL (ref 6–12)
POTASSIUM SERPL-SCNC: 4.2 MMOL/L (ref 3.5–5.2)
PROCALCITONIN SERPL-MCNC: 0.11 NG/ML (ref 0–0.25)
PROT SERPL-MCNC: 7 G/DL (ref 6–8.5)
PROTHROMBIN TIME: 22.9 SECONDS (ref 11.4–14.4)
RBC # BLD AUTO: 3.65 10*6/MM3 (ref 3.77–5.28)
SARS-COV-2 RNA RESP QL NAA+PROBE: NOT DETECTED
SODIUM SERPL-SCNC: 132 MMOL/L (ref 136–145)
TROPONIN T SERPL-MCNC: 0.04 NG/ML (ref 0–0.03)
TROPONIN T SERPL-MCNC: 0.05 NG/ML (ref 0–0.03)
WBC NRBC COR # BLD: 5.05 10*3/MM3 (ref 3.4–10.8)
WHOLE BLOOD HOLD COAG: NORMAL
WHOLE BLOOD HOLD SPECIMEN: NORMAL

## 2022-07-11 PROCEDURE — 93005 ELECTROCARDIOGRAM TRACING: CPT

## 2022-07-11 PROCEDURE — 84443 ASSAY THYROID STIM HORMONE: CPT | Performed by: FAMILY MEDICINE

## 2022-07-11 PROCEDURE — 82310 ASSAY OF CALCIUM: CPT

## 2022-07-11 PROCEDURE — 85025 COMPLETE CBC W/AUTO DIFF WBC: CPT

## 2022-07-11 PROCEDURE — 99223 1ST HOSP IP/OBS HIGH 75: CPT | Performed by: FAMILY MEDICINE

## 2022-07-11 PROCEDURE — 84484 ASSAY OF TROPONIN QUANT: CPT

## 2022-07-11 PROCEDURE — 83615 LACTATE (LD) (LDH) ENZYME: CPT | Performed by: FAMILY MEDICINE

## 2022-07-11 PROCEDURE — 74177 CT ABD & PELVIS W/CONTRAST: CPT

## 2022-07-11 PROCEDURE — 83735 ASSAY OF MAGNESIUM: CPT

## 2022-07-11 PROCEDURE — 25010000002 FUROSEMIDE PER 20 MG: Performed by: EMERGENCY MEDICINE

## 2022-07-11 PROCEDURE — 0 IOPAMIDOL PER 1 ML: Performed by: FAMILY MEDICINE

## 2022-07-11 PROCEDURE — 93010 ELECTROCARDIOGRAM REPORT: CPT | Performed by: INTERNAL MEDICINE

## 2022-07-11 PROCEDURE — 84145 PROCALCITONIN (PCT): CPT

## 2022-07-11 PROCEDURE — 80053 COMPREHEN METABOLIC PANEL: CPT

## 2022-07-11 PROCEDURE — 80074 ACUTE HEPATITIS PANEL: CPT | Performed by: FAMILY MEDICINE

## 2022-07-11 PROCEDURE — 82977 ASSAY OF GGT: CPT | Performed by: FAMILY MEDICINE

## 2022-07-11 PROCEDURE — 87636 SARSCOV2 & INF A&B AMP PRB: CPT | Performed by: FAMILY MEDICINE

## 2022-07-11 PROCEDURE — 85379 FIBRIN DEGRADATION QUANT: CPT | Performed by: FAMILY MEDICINE

## 2022-07-11 PROCEDURE — 83880 ASSAY OF NATRIURETIC PEPTIDE: CPT

## 2022-07-11 PROCEDURE — 99284 EMERGENCY DEPT VISIT MOD MDM: CPT

## 2022-07-11 PROCEDURE — 71045 X-RAY EXAM CHEST 1 VIEW: CPT

## 2022-07-11 PROCEDURE — 85610 PROTHROMBIN TIME: CPT

## 2022-07-11 PROCEDURE — 71275 CT ANGIOGRAPHY CHEST: CPT

## 2022-07-11 RX ORDER — FUROSEMIDE 10 MG/ML
80 INJECTION INTRAMUSCULAR; INTRAVENOUS ONCE
Status: COMPLETED | OUTPATIENT
Start: 2022-07-11 | End: 2022-07-11

## 2022-07-11 RX ORDER — ALBUTEROL SULFATE 2.5 MG/3ML
2.5 SOLUTION RESPIRATORY (INHALATION) EVERY 6 HOURS PRN
Status: DISCONTINUED | OUTPATIENT
Start: 2022-07-11 | End: 2022-07-15 | Stop reason: HOSPADM

## 2022-07-11 RX ORDER — CALCIUM CARBONATE 200(500)MG
1 TABLET,CHEWABLE ORAL 2 TIMES DAILY PRN
Status: DISCONTINUED | OUTPATIENT
Start: 2022-07-11 | End: 2022-07-15 | Stop reason: HOSPADM

## 2022-07-11 RX ORDER — SODIUM CHLORIDE 0.9 % (FLUSH) 0.9 %
10 SYRINGE (ML) INJECTION AS NEEDED
Status: DISCONTINUED | OUTPATIENT
Start: 2022-07-11 | End: 2022-07-15 | Stop reason: HOSPADM

## 2022-07-11 RX ADMIN — FUROSEMIDE 80 MG: 10 INJECTION, SOLUTION INTRAMUSCULAR; INTRAVENOUS at 21:39

## 2022-07-11 RX ADMIN — IOPAMIDOL 80 ML: 755 INJECTION, SOLUTION INTRAVENOUS at 21:09

## 2022-07-11 NOTE — H&P
"    [facility] Medicine Services  HISTORY AND PHYSICAL    Patient Name: Ivory Carr  : 1945  MRN: [id number]  Primary Care Physician: Christiane Peter APRN  Date of admission: 2022    Subjective   Subjective     Chief Complaint:  Generalized edema    HPI:  Ivory Carr is a 76 y.o. female with past medical history of a flutter on Xarelto status post ablation , diabetes type 2, CKD stage III, severe tricuspid regurgitation, cirrhosis, and hypertension who presents to the ED from primary care provider with increased bilateral lower extremity swelling as well as abdominal distention.  She has pain in her lower abdomen. She is easily short of breath and dizzy with exertion. She takes 40 mg oral Lasix at home and patient states it is not working. She complains of burning with urination decreased frequency.  She also states she has a history of diverticulitis with her last colonoscopy being about 2 years ago.  She has poor p.o. intake due to increased bloating and abdominal pain and nausea.  She admits to fever and chills this past week but did not check a temperature.  Has followed with Dr. Garcia in the past.  Denies cough, chest pain, vomiting, diarrhea, hematuria.  Upon presentation to the ED patient was noted to have significant pitting edema of lower extremities along with ascites and anasarca.  CT of the abdomen and pelvis noted \"anasarca in the soft tissues with trace amount of ascites present within the upper abdomen.  Findings are likely related to cirrhosis or fluid overload. \"Due to these findings and uncontrolled lower extremity edema and abdominal distention patient will be admitted to the hospital service for further evaluation management.      Review of Systems   Constitutional: Positive for activity change, appetite change, chills and fatigue.   HENT: Negative for congestion, sore throat and trouble swallowing.    Eyes: Negative.    Respiratory: Positive for " cough (occassional ), shortness of breath (on exertion) and wheezing. Negative for chest tightness.         Orthopnea- sits in rocking chair because she is short of breath.    Cardiovascular: Positive for leg swelling. Negative for chest pain.   Gastrointestinal: Positive for abdominal distention, abdominal pain (lower abdomen) and nausea. Negative for constipation and diarrhea.   Endocrine: Negative.    Genitourinary: Positive for decreased urine volume and dysuria. Negative for hematuria.   Musculoskeletal: Negative.    Neurological: Positive for dizziness and light-headedness. Negative for syncope.        Near syncope when ambulating    Psychiatric/Behavioral: Negative for agitation and confusion.        All other systems reviewed and are negative.     Personal History     Past Medical History:   Diagnosis Date   • ASD (atrial septal defect)    • Atrial flutter (HCC) 9/24/2020    Added automatically from request for surgery [id number]   • CKD (chronic kidney disease) 10/6/2020   • Diabetes (HCC)              Past Surgical History:   Procedure Laterality Date   • CARDIAC CATHETERIZATION N/A 10/9/2020    Procedure: Right Heart Cath;  Surgeon: Irvin Mccray MD;  Location:  [facility];  Service: Cardiology;  Laterality: N/A;   • CARDIAC ELECTROPHYSIOLOGY PROCEDURE N/A 10/15/2020    Procedure: Ablation atrial flutter;  Surgeon: Joseph Garcia DO;  Location:  [facility];  Service: Cardiovascular;  Laterality: N/A;   • CHOLECYSTECTOMY         Family History:  family history includes Diabetes in her mother, sister, sister, and sister; Emphysema in her father; Hyperlipidemia in her mother; Hypertension in her mother; Lung disease in her sister; No Known Problems in her sister; Other in her brother, brother, brother, brother, brother, and son. Otherwise pertinent FHx was reviewed and unremarkable.     Social History:  reports that she has never smoked. She has never used smokeless  tobacco. She reports that she does not drink alcohol and does not use drugs.  Social History     Social History Narrative    CAFFEINE: COFFEE OCCASIONALLY        Medications:  empagliflozin, furosemide, insulin NPH-insulin regular, insulin glargine, insulin regular, lisinopril, metoprolol tartrate, and rivaroxaban    Allergies   Allergen Reactions   • Statins Myalgia       Objective   Objective     Vital Signs:   Temp:  [98 °F (36.7 °C)] 98 °F (36.7 °C)  Heart Rate:  [71] 71  Resp:  [16] 16  BP: (134)/(68) 134/68    Physical Exam  Vitals reviewed.   Constitutional:       General: She is not in acute distress.     Appearance: She is obese.   HENT:      Head: Normocephalic and atraumatic.      Nose: Nose normal. No congestion.      Mouth/Throat:      Mouth: Mucous membranes are moist.   Eyes:      Extraocular Movements: Extraocular movements intact.      Pupils: Pupils are equal, round, and reactive to light.   Cardiovascular:      Rate and Rhythm: Normal rate. Rhythm irregular.      Heart sounds: Murmur heard.   Pulmonary:      Effort: Respiratory distress (on exertion) present.      Breath sounds: Wheezing (left sided) present.   Abdominal:      General: There is distension.      Tenderness: There is abdominal tenderness.      Comments: Anasarca    Musculoskeletal:      Cervical back: Normal range of motion. No tenderness.      Right lower leg: Edema present.      Left lower leg: Edema present.   Skin:     General: Skin is warm.      Capillary Refill: Capillary refill takes less than 2 seconds.      Findings: No erythema.   Neurological:      General: No focal deficit present.      Mental Status: She is alert and oriented to person, place, and time. Mental status is at baseline.      Cranial Nerves: No cranial nerve deficit.      Sensory: No sensory deficit.   Psychiatric:         Mood and Affect: Mood normal.         Behavior: Behavior normal.         Thought Content: Thought content normal.         Judgment:  Judgment normal.        Results Reviewed:  I have personally reviewed most recent indicated data and agree with findings including:  [x]  Laboratory  [x]  Radiology  [x]  EKG/Telemetry  []  Pathology  []  Cardiac/Vascular Studies  [x]  Old records  []  Other:  Most pertinent findings include:      LAB RESULTS:      Lab 07/11/22  1648   WBC 5.05   HEMOGLOBIN 9.9*   HEMATOCRIT 31.0*   PLATELETS 207   NEUTROS ABS 3.23   IMMATURE GRANS (ABS) 0.01   LYMPHS ABS 1.40   MONOS ABS 0.34   EOS ABS 0.05   MCV 84.9         Lab 07/11/22  1648   SODIUM 132*   POTASSIUM 4.2   CHLORIDE 93*   CO2 27.0   ANION GAP 12.0   BUN 28*   CREATININE 1.44*   EGFR 37.3*   GLUCOSE 237*   CALCIUM 9.6         Lab 07/11/22  1648   TOTAL PROTEIN 7.0   ALBUMIN 3.70   GLOBULIN 3.3   ALT (SGPT) 13   AST (SGOT) 21   BILIRUBIN 0.4   ALK PHOS 64         Lab 07/11/22  1648   PROBNP 1,114.0   TROPONIN T 0.053*                 Brief Urine Lab Results     None        Microbiology Results (last 10 days)     ** No results found for the last 240 hours. **          XR Chest 1 View    Result Date: 7/11/2022  Examination: XR CHEST 1 VW-  Date of Exam: 7/11/2022 5:47 PM  Indication: SOA triage protocol.  Comparison: Radiograph 10/06/2020  Technique: 1 view of the chest  Findings: There is a small right-sided pleural effusion. Mild atelectatic changes are present within the right lung base. Median sternotomy wires are present. No pneumothorax. The heart size is normal. The pulmonary vasculature appears within normal limits. No acute osseous abnormality identified.      Impression: Small right-sided pleural effusion with overlying atelectasis, similar as compared to the previous study from 10/06/2020.  This report was finalized on 7/11/2022 6:15 PM by Lisa Beltre MD.        Results for orders placed during the hospital encounter of 12/22/21    Adult Transesophageal Echo (BLANCA) W/ Cont if Necessary Per Protocol    Interpretation Summary  · Left ventricular ejection  fraction appears to be 56 - 60%  · No evidence of a left atrial appendage thrombus was present.  · Interatrial septal aneurysm present. Septum appears previously surgically repaired  · Moderate mitral annular calcification is present. Mild mitral valve regurgitation is present with a centrally-directed jet noted  · Mild to moderate mitral valve stenosis is present.  · Variable mitral valve mean gradient secondary to atrial fibrillation. Measured 8 mmHg. Valve area by pressure half-time 2.6 cm²  · Severe tricuspid valve regurgitation is present. Estimated right ventricular systolic pressure from tricuspid regurgitation is markedly elevated (>55 mmHg).  · The aortic valve exhibits sclerotic changes. There is a small, 3 mm, mobile mass identified on the noncoronary cusp. 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      Assessment & Plan   Assessment & Plan       Acute on chronic respiratory failure (HCC)    Diabetes (HCC)    Anasarca and ascites    Anemia    Stage 3a chronic kidney disease (HCC)    Essential hypertension    Typical atrial flutter (HCC)    Elevated troponin    KIMMIE (acute kidney injury) (HCC)      Acute on Chronic Respiratory Failure  Elevated troponin  -Trop elevated on admission, trend  -Xray shows small right-sided pleural effusion, mild atelectatic changes in right lung base  -80mg IV lasix in ED  -SOA on exertion  -D dimer 3.02  -Pending CTA chest  -Albuterol nebs PRN   -hx of severe tricuspid regurg. Repeat echo   - consult cardiology    Cirrhosis/anasacrca/Lower extremity edema/Ascites  -Previous paracentesis in 2020/minimal relief   -Pending CT abd/pelvis  -repeat echo  -Consult GI, needs to establish care with GI for management of her cirrhosis   -We will obtain labs to further evaluate underlying etiology of patient cirrhosis and ascites.  Check  acute hepatitis panel, check coags, TIEN, antimicrosomal antibodies, ferritin, hemochromatosis mutation, anti-smooth muscle antibody titer, Ig G and IgA.    Aflutter s/p ablation 2020  -Controlled with metoprolol  -Continue Xarelto for now  -Follows with Dr. Garcia    Dysuria  -Pending UA    Hyponatremia  - corrected sodium is 134   -Trend with AM labs    Dizziness  - likely multifactorial-- severe tricuspid regurg in the past/3rd spacing of fluid/anemic   -orthostatic q shift  - get repeat ECHO in am  -consult PT/OT  - consult cardiology     Diabetes  -SSI  -ACHS finger sticks  -pending HgbA1c    Acute on chronic kidney disease stage 3  -Cr 1.44, baseline 0.95-1.05  -Avoid nephrotoxic agents  -Strict I/Os  - consider nephrology consult     HTN  -Hold lisinopril in setting of dizziness and KIMMIE     Chronic anemia  -likely due to iron deficiency   -Hgb 9.9  -trend H&H    Previous history of hypothyroidism  - Patient states that she was previously on Synthroid and is concerned that she is no longer on Synthroid and request further evaluation of this is underlying cause of her edema.  We will check a TSH.    DVT prophylaxis:  On Xarelto    CODE STATUS:  FULL       This note has been completed as part of a split-shared workflow.     Signature: Electronically signed by TEAGAN Trivedi, 07/11/22, 9:12 PM EDT.      Attending   Admission Attestation       I have seen and examined the patient, performing an independent face-to-face diagnostic evaluation with plan of care reviewed and developed with the advanced practice clinician (APC).      Brief Summary Statement:   Ivory Carr is a 76 y.o. female with past medical history of atrial flutter on Xarelto, status post ablation in 2020, T2DM, severe tricuspid regurgitation, cirrhosis, CKD stage III, and HTN.  Patient presented to [facility] ED with increased bilateral lower extremity edema and abdominal distention and pain.  Patient reports increased shortness  "of breath, dizziness with exertion and near syncope, also reports orthopnea and PND.  Patient has been on 40 mg of Lasix and states that this has not been beneficial.  Upon review of patient's past medical history and 2020 patient was admitted with similar symptoms.  Noted to have ascites and anasarca.  Also noted to have severe tricuspid regurgitation,.  Patient underwent BLANCA by Dr. Monroe on 12/22/2021.  She was noted to have mild to moderate mitral stenosis at that time.  With an EF of 50 to 60%.  On exam patient is noted to have marked bilateral lower extremity edema and anasarca.  Abdomen is diffusely tender.  CT of the abdomen and pelvis noted \"anasarca in the soft tissues with trace amount of ascites present within the upper abdomen.  Findings are likely related to cirrhosis or fluid overload. \"  Therefore decision was made to admit patient to hospital service for further evaluation management of anasarca and volume overload.    Remainder of detailed HPI is as noted by APC and has been reviewed and/or edited by me for completeness.    Attending Physical Exam:  Constitutional: Awake, alert  Eyes: PERRLA, sclerae anicteric, no conjunctival injection  HENT: NCAT, mucous membranes moist  Neck: Supple, no thyromegaly, no lymphadenopathy, trachea midline  Respiratory: Rales in bases, nonlabored respirations   Cardiovascular: Irregular with systolic ejection murmur, rubs, or gallops, decreased pedal pulses bilaterally  Gastrointestinal: Positive bowel sounds, soft, nontender, nondistended  Musculoskeletal: 4+ pitting edema, no clubbing or cyanosis to extremities  Psychiatric: Appropriate affect, cooperative  Neurologic: Oriented x 3, strength symmetric in all extremities, Cranial Nerves grossly intact to confrontation, speech clear  Skin: No rashes      Brief Assessment/Plan :  See detailed assessment and plan developed with APC which I have reviewed and/or edited for completeness.        Monique Narayan, " DO  07/11/22

## 2022-07-12 ENCOUNTER — APPOINTMENT (OUTPATIENT)
Dept: CARDIOLOGY | Facility: HOSPITAL | Age: 77
End: 2022-07-12

## 2022-07-12 PROBLEM — N17.9 AKI (ACUTE KIDNEY INJURY) (HCC): Status: ACTIVE | Noted: 2022-07-12

## 2022-07-12 LAB
ALBUMIN SERPL-MCNC: 3.9 G/DL (ref 3.5–5.2)
ALBUMIN/GLOB SERPL: 1.2 G/DL
ALP SERPL-CCNC: 65 U/L (ref 39–117)
ALT SERPL W P-5'-P-CCNC: 14 U/L (ref 1–33)
ANION GAP SERPL CALCULATED.3IONS-SCNC: 9 MMOL/L (ref 5–15)
AST SERPL-CCNC: 18 U/L (ref 1–32)
BACTERIA UR QL AUTO: ABNORMAL /HPF
BACTERIA UR QL AUTO: ABNORMAL /HPF
BASOPHILS # BLD AUTO: 0.02 10*3/MM3 (ref 0–0.2)
BASOPHILS NFR BLD AUTO: 0.5 % (ref 0–1.5)
BH CV ECHO MEAS - AO MAX PG: 5.6 MMHG
BH CV ECHO MEAS - AO MEAN PG: 3 MMHG
BH CV ECHO MEAS - AO ROOT DIAM: 3 CM
BH CV ECHO MEAS - AO V2 MAX: 118 CM/SEC
BH CV ECHO MEAS - AO V2 VTI: 25 CM
BH CV ECHO MEAS - AVA(I,D): 0.85 CM2
BH CV ECHO MEAS - EDV(CUBED): 27 ML
BH CV ECHO MEAS - EDV(MOD-SP2): 94.4 ML
BH CV ECHO MEAS - EDV(MOD-SP4): 71.8 ML
BH CV ECHO MEAS - EF(MOD-BP): 45.1 %
BH CV ECHO MEAS - EF(MOD-SP2): 49 %
BH CV ECHO MEAS - EF(MOD-SP4): 43.5 %
BH CV ECHO MEAS - ESV(CUBED): 9.3 ML
BH CV ECHO MEAS - ESV(MOD-SP2): 48.1 ML
BH CV ECHO MEAS - ESV(MOD-SP4): 40.6 ML
BH CV ECHO MEAS - FS: 30 %
BH CV ECHO MEAS - IVS/LVPW: 0.85 CM
BH CV ECHO MEAS - IVSD: 1.3 CM
BH CV ECHO MEAS - LA DIMENSION: 3.7 CM
BH CV ECHO MEAS - LAT PEAK E' VEL: 8.1 CM/SEC
BH CV ECHO MEAS - LV MASS(C)D: 109.1 GRAMS
BH CV ECHO MEAS - LV MAX PG: 1.42 MMHG
BH CV ECHO MEAS - LV MEAN PG: 1 MMHG
BH CV ECHO MEAS - LV V1 MAX: 59.7 CM/SEC
BH CV ECHO MEAS - LV V1 VTI: 12.1 CM
BH CV ECHO MEAS - LVIDD: 3 CM
BH CV ECHO MEAS - LVIDS: 2.1 CM
BH CV ECHO MEAS - LVOT AREA: 1.77 CM2
BH CV ECHO MEAS - LVOT DIAM: 1.5 CM
BH CV ECHO MEAS - LVPWD: 1.3 CM
BH CV ECHO MEAS - MED PEAK E' VEL: 4.8 CM/SEC
BH CV ECHO MEAS - MV E MAX VEL: 154 CM/SEC
BH CV ECHO MEAS - MV MAX PG: 11.9 MMHG
BH CV ECHO MEAS - MV MEAN PG: 5.3 MMHG
BH CV ECHO MEAS - MV V2 VTI: 47.4 CM
BH CV ECHO MEAS - MVA(VTI): 0.45 CM2
BH CV ECHO MEAS - PA ACC TIME: 0.1 SEC
BH CV ECHO MEAS - PA PR(ACCEL): 34.4 MMHG
BH CV ECHO MEAS - PI END-D VEL: 75.4 CM/SEC
BH CV ECHO MEAS - RAP SYSTOLE: 8 MMHG
BH CV ECHO MEAS - RVSP: 37 MMHG
BH CV ECHO MEAS - SV(LVOT): 21.3 ML
BH CV ECHO MEAS - SV(MOD-SP2): 46.3 ML
BH CV ECHO MEAS - SV(MOD-SP4): 31.2 ML
BH CV ECHO MEAS - TAPSE (>1.6): 0.94 CM
BH CV ECHO MEAS - TR MAX PG: 21.6 MMHG
BH CV ECHO MEAS - TR MAX VEL: 229.7 CM/SEC
BH CV ECHO MEASUREMENTS AVERAGE E/E' RATIO: 23.88
BH CV VAS BP LEFT ARM: NORMAL MMHG
BH CV XLRA - RV BASE: 4 CM
BH CV XLRA - RV LENGTH: 6 CM
BH CV XLRA - RV MID: 3.8 CM
BH CV XLRA - TDI S': 8.6 CM/SEC
BILIRUB SERPL-MCNC: 0.6 MG/DL (ref 0–1.2)
BILIRUB UR QL STRIP: NEGATIVE
BILIRUB UR QL STRIP: NEGATIVE
BUN SERPL-MCNC: 26 MG/DL (ref 8–23)
BUN/CREAT SERPL: 17.3 (ref 7–25)
CALCIUM SPEC-SCNC: 9 MG/DL (ref 8.6–10.5)
CERULOPLASMIN SERPL-MCNC: 41 MG/DL (ref 19–39)
CHLORIDE SERPL-SCNC: 93 MMOL/L (ref 98–107)
CHOLEST SERPL-MCNC: 148 MG/DL (ref 0–200)
CLARITY UR: CLEAR
CLARITY UR: CLEAR
CO2 SERPL-SCNC: 32 MMOL/L (ref 22–29)
COLOR UR: YELLOW
COLOR UR: YELLOW
CREAT SERPL-MCNC: 1.5 MG/DL (ref 0.57–1)
DEPRECATED RDW RBC AUTO: 47.3 FL (ref 37–54)
EGFRCR SERPLBLD CKD-EPI 2021: 35.5 ML/MIN/1.73
EOSINOPHIL # BLD AUTO: 0.08 10*3/MM3 (ref 0–0.4)
EOSINOPHIL NFR BLD AUTO: 1.9 % (ref 0.3–6.2)
ERYTHROCYTE [DISTWIDTH] IN BLOOD BY AUTOMATED COUNT: 15.3 % (ref 12.3–15.4)
FERRITIN SERPL-MCNC: 34.17 NG/ML (ref 13–150)
GGT SERPL-CCNC: 60 U/L (ref 5–36)
GLOBULIN UR ELPH-MCNC: 3.2 GM/DL
GLUCOSE BLDC GLUCOMTR-MCNC: 277 MG/DL (ref 70–130)
GLUCOSE BLDC GLUCOMTR-MCNC: 289 MG/DL (ref 70–130)
GLUCOSE BLDC GLUCOMTR-MCNC: 77 MG/DL (ref 70–130)
GLUCOSE SERPL-MCNC: 101 MG/DL (ref 65–99)
GLUCOSE UR STRIP-MCNC: ABNORMAL MG/DL
GLUCOSE UR STRIP-MCNC: ABNORMAL MG/DL
HAV IGM SERPL QL IA: NORMAL
HBA1C MFR BLD: 10.6 % (ref 4.8–5.6)
HBV CORE IGM SERPL QL IA: NORMAL
HBV SURFACE AG SERPL QL IA: NORMAL
HCT VFR BLD AUTO: 34.4 % (ref 34–46.6)
HCV AB SER DONR QL: NORMAL
HDLC SERPL-MCNC: 35 MG/DL (ref 40–60)
HGB BLD-MCNC: 10.8 G/DL (ref 12–15.9)
HGB UR QL STRIP.AUTO: NEGATIVE
HGB UR QL STRIP.AUTO: NEGATIVE
HYALINE CASTS UR QL AUTO: ABNORMAL /LPF
HYALINE CASTS UR QL AUTO: ABNORMAL /LPF
IGA1 MFR SER: 379 MG/DL (ref 70–400)
IGG1 SER-MCNC: 1287 MG/DL (ref 700–1600)
IMM GRANULOCYTES # BLD AUTO: 0.01 10*3/MM3 (ref 0–0.05)
IMM GRANULOCYTES NFR BLD AUTO: 0.2 % (ref 0–0.5)
IRON 24H UR-MRATE: 33 MCG/DL (ref 37–145)
IRON SATN MFR SERPL: 6 % (ref 20–50)
KETONES UR QL STRIP: NEGATIVE
KETONES UR QL STRIP: NEGATIVE
LDLC SERPL CALC-MCNC: 98 MG/DL (ref 0–100)
LDLC/HDLC SERPL: 2.79 {RATIO}
LEUKOCYTE ESTERASE UR QL STRIP.AUTO: ABNORMAL
LEUKOCYTE ESTERASE UR QL STRIP.AUTO: ABNORMAL
LV EF 2D ECHO EST: 65 %
LYMPHOCYTES # BLD AUTO: 1.22 10*3/MM3 (ref 0.7–3.1)
LYMPHOCYTES NFR BLD AUTO: 28.7 % (ref 19.6–45.3)
MAGNESIUM SERPL-MCNC: 2.2 MG/DL (ref 1.6–2.4)
MAXIMAL PREDICTED HEART RATE: 142 BPM
MCH RBC QN AUTO: 26.7 PG (ref 26.6–33)
MCHC RBC AUTO-ENTMCNC: 31.4 G/DL (ref 31.5–35.7)
MCV RBC AUTO: 84.9 FL (ref 79–97)
MONOCYTES # BLD AUTO: 0.36 10*3/MM3 (ref 0.1–0.9)
MONOCYTES NFR BLD AUTO: 8.5 % (ref 5–12)
NEUTROPHILS NFR BLD AUTO: 2.56 10*3/MM3 (ref 1.7–7)
NEUTROPHILS NFR BLD AUTO: 60.2 % (ref 42.7–76)
NITRITE UR QL STRIP: NEGATIVE
NITRITE UR QL STRIP: POSITIVE
NRBC BLD AUTO-RTO: 0 /100 WBC (ref 0–0.2)
PH UR STRIP.AUTO: 6 [PH] (ref 5–8)
PH UR STRIP.AUTO: 7 [PH] (ref 5–8)
PHOSPHATE SERPL-MCNC: 4.8 MG/DL (ref 2.5–4.5)
PLATELET # BLD AUTO: 207 10*3/MM3 (ref 140–450)
PMV BLD AUTO: 10.6 FL (ref 6–12)
POTASSIUM SERPL-SCNC: 3.8 MMOL/L (ref 3.5–5.2)
PREALB SERPL-MCNC: 13.7 MG/DL (ref 20–40)
PROT SERPL-MCNC: 7.1 G/DL (ref 6–8.5)
PROT UR QL STRIP: NEGATIVE
PROT UR QL STRIP: NEGATIVE
RBC # BLD AUTO: 4.05 10*6/MM3 (ref 3.77–5.28)
RBC # UR STRIP: ABNORMAL /HPF
RBC # UR STRIP: ABNORMAL /HPF
REF LAB TEST METHOD: ABNORMAL
REF LAB TEST METHOD: ABNORMAL
SODIUM SERPL-SCNC: 134 MMOL/L (ref 136–145)
SP GR UR STRIP: 1.01 (ref 1–1.03)
SP GR UR STRIP: >=1.03 (ref 1–1.03)
SQUAMOUS #/AREA URNS HPF: ABNORMAL /HPF
SQUAMOUS #/AREA URNS HPF: ABNORMAL /HPF
STRESS TARGET HR: 121 BPM
TIBC SERPL-MCNC: 554 MCG/DL (ref 298–536)
TRANSFERRIN SERPL-MCNC: 372 MG/DL (ref 200–360)
TRIGL SERPL-MCNC: 77 MG/DL (ref 0–150)
TROPONIN T SERPL-MCNC: 0.05 NG/ML (ref 0–0.03)
TROPONIN T SERPL-MCNC: 0.06 NG/ML (ref 0–0.03)
TSH SERPL DL<=0.05 MIU/L-ACNC: 3.73 UIU/ML (ref 0.27–4.2)
UROBILINOGEN UR QL STRIP: ABNORMAL
UROBILINOGEN UR QL STRIP: ABNORMAL
VLDLC SERPL-MCNC: 15 MG/DL (ref 5–40)
WBC # UR STRIP: ABNORMAL /HPF
WBC # UR STRIP: ABNORMAL /HPF
WBC NRBC COR # BLD: 4.25 10*3/MM3 (ref 3.4–10.8)

## 2022-07-12 PROCEDURE — 82784 ASSAY IGA/IGD/IGG/IGM EACH: CPT | Performed by: FAMILY MEDICINE

## 2022-07-12 PROCEDURE — 82104 ALPHA-1-ANTITRYPSIN PHENO: CPT | Performed by: FAMILY MEDICINE

## 2022-07-12 PROCEDURE — 86708 HEPATITIS A ANTIBODY: CPT | Performed by: FAMILY MEDICINE

## 2022-07-12 PROCEDURE — 82103 ALPHA-1-ANTITRYPSIN TOTAL: CPT | Performed by: FAMILY MEDICINE

## 2022-07-12 PROCEDURE — 87088 URINE BACTERIA CULTURE: CPT | Performed by: HOSPITALIST

## 2022-07-12 PROCEDURE — 84484 ASSAY OF TROPONIN QUANT: CPT | Performed by: FAMILY MEDICINE

## 2022-07-12 PROCEDURE — 83540 ASSAY OF IRON: CPT | Performed by: FAMILY MEDICINE

## 2022-07-12 PROCEDURE — 86015 ACTIN ANTIBODY EACH: CPT | Performed by: FAMILY MEDICINE

## 2022-07-12 PROCEDURE — 25010000002 NA FERRIC GLUC CPLX PER 12.5 MG: Performed by: HOSPITALIST

## 2022-07-12 PROCEDURE — 99222 1ST HOSP IP/OBS MODERATE 55: CPT | Performed by: INTERNAL MEDICINE

## 2022-07-12 PROCEDURE — 63710000001 INSULIN LISPRO (HUMAN) PER 5 UNITS

## 2022-07-12 PROCEDURE — 97162 PT EVAL MOD COMPLEX 30 MIN: CPT

## 2022-07-12 PROCEDURE — 93005 ELECTROCARDIOGRAM TRACING: CPT | Performed by: FAMILY MEDICINE

## 2022-07-12 PROCEDURE — 82390 ASSAY OF CERULOPLASMIN: CPT | Performed by: FAMILY MEDICINE

## 2022-07-12 PROCEDURE — 93306 TTE W/DOPPLER COMPLETE: CPT

## 2022-07-12 PROCEDURE — 82728 ASSAY OF FERRITIN: CPT | Performed by: FAMILY MEDICINE

## 2022-07-12 PROCEDURE — 84484 ASSAY OF TROPONIN QUANT: CPT

## 2022-07-12 PROCEDURE — 84100 ASSAY OF PHOSPHORUS: CPT

## 2022-07-12 PROCEDURE — 87086 URINE CULTURE/COLONY COUNT: CPT | Performed by: FAMILY MEDICINE

## 2022-07-12 PROCEDURE — 87088 URINE BACTERIA CULTURE: CPT | Performed by: FAMILY MEDICINE

## 2022-07-12 PROCEDURE — 85025 COMPLETE CBC W/AUTO DIFF WBC: CPT

## 2022-07-12 PROCEDURE — 87186 SC STD MICRODIL/AGAR DIL: CPT | Performed by: FAMILY MEDICINE

## 2022-07-12 PROCEDURE — 81256 HFE GENE: CPT | Performed by: FAMILY MEDICINE

## 2022-07-12 PROCEDURE — 93306 TTE W/DOPPLER COMPLETE: CPT | Performed by: INTERNAL MEDICINE

## 2022-07-12 PROCEDURE — 83735 ASSAY OF MAGNESIUM: CPT

## 2022-07-12 PROCEDURE — 87086 URINE CULTURE/COLONY COUNT: CPT | Performed by: HOSPITALIST

## 2022-07-12 PROCEDURE — 81001 URINALYSIS AUTO W/SCOPE: CPT | Performed by: FAMILY MEDICINE

## 2022-07-12 PROCEDURE — 84134 ASSAY OF PREALBUMIN: CPT | Performed by: FAMILY MEDICINE

## 2022-07-12 PROCEDURE — 99233 SBSQ HOSP IP/OBS HIGH 50: CPT | Performed by: HOSPITALIST

## 2022-07-12 PROCEDURE — 83036 HEMOGLOBIN GLYCOSYLATED A1C: CPT

## 2022-07-12 PROCEDURE — 99253 IP/OBS CNSLTJ NEW/EST LOW 45: CPT | Performed by: PHYSICIAN ASSISTANT

## 2022-07-12 PROCEDURE — 82962 GLUCOSE BLOOD TEST: CPT

## 2022-07-12 PROCEDURE — 87186 SC STD MICRODIL/AGAR DIL: CPT | Performed by: HOSPITALIST

## 2022-07-12 PROCEDURE — 81001 URINALYSIS AUTO W/SCOPE: CPT | Performed by: HOSPITALIST

## 2022-07-12 PROCEDURE — 86376 MICROSOMAL ANTIBODY EACH: CPT | Performed by: FAMILY MEDICINE

## 2022-07-12 PROCEDURE — 80053 COMPREHEN METABOLIC PANEL: CPT | Performed by: FAMILY MEDICINE

## 2022-07-12 PROCEDURE — 25010000002 CEFTRIAXONE PER 250 MG: Performed by: HOSPITALIST

## 2022-07-12 PROCEDURE — 84466 ASSAY OF TRANSFERRIN: CPT | Performed by: FAMILY MEDICINE

## 2022-07-12 PROCEDURE — 93010 ELECTROCARDIOGRAM REPORT: CPT | Performed by: INTERNAL MEDICINE

## 2022-07-12 PROCEDURE — 80061 LIPID PANEL: CPT | Performed by: FAMILY MEDICINE

## 2022-07-12 RX ORDER — DEXTROSE MONOHYDRATE 25 G/50ML
25 INJECTION, SOLUTION INTRAVENOUS
Status: DISCONTINUED | OUTPATIENT
Start: 2022-07-12 | End: 2022-07-15 | Stop reason: HOSPADM

## 2022-07-12 RX ORDER — METOPROLOL TARTRATE 50 MG/1
50 TABLET, FILM COATED ORAL 2 TIMES DAILY
Status: DISCONTINUED | OUTPATIENT
Start: 2022-07-12 | End: 2022-07-12

## 2022-07-12 RX ORDER — INSULIN LISPRO 100 [IU]/ML
0-7 INJECTION, SOLUTION INTRAVENOUS; SUBCUTANEOUS
Status: DISCONTINUED | OUTPATIENT
Start: 2022-07-12 | End: 2022-07-13

## 2022-07-12 RX ORDER — ACETAMINOPHEN 650 MG/1
650 SUPPOSITORY RECTAL EVERY 4 HOURS PRN
Status: DISCONTINUED | OUTPATIENT
Start: 2022-07-12 | End: 2022-07-15 | Stop reason: HOSPADM

## 2022-07-12 RX ORDER — BUMETANIDE 0.25 MG/ML
1 INJECTION INTRAMUSCULAR; INTRAVENOUS EVERY 12 HOURS SCHEDULED
Status: DISCONTINUED | OUTPATIENT
Start: 2022-07-12 | End: 2022-07-14

## 2022-07-12 RX ORDER — ACETAMINOPHEN 160 MG/5ML
650 SOLUTION ORAL EVERY 4 HOURS PRN
Status: DISCONTINUED | OUTPATIENT
Start: 2022-07-12 | End: 2022-07-15 | Stop reason: HOSPADM

## 2022-07-12 RX ORDER — NICOTINE POLACRILEX 4 MG
15 LOZENGE BUCCAL
Status: DISCONTINUED | OUTPATIENT
Start: 2022-07-12 | End: 2022-07-15 | Stop reason: HOSPADM

## 2022-07-12 RX ORDER — ACETAMINOPHEN 325 MG/1
650 TABLET ORAL EVERY 4 HOURS PRN
Status: DISCONTINUED | OUTPATIENT
Start: 2022-07-12 | End: 2022-07-15 | Stop reason: HOSPADM

## 2022-07-12 RX ORDER — METOLAZONE 5 MG/1
5 TABLET ORAL ONCE
Status: DISCONTINUED | OUTPATIENT
Start: 2022-07-12 | End: 2022-07-12

## 2022-07-12 RX ORDER — SODIUM CHLORIDE 0.9 % (FLUSH) 0.9 %
10 SYRINGE (ML) INJECTION EVERY 12 HOURS SCHEDULED
Status: DISCONTINUED | OUTPATIENT
Start: 2022-07-12 | End: 2022-07-15 | Stop reason: HOSPADM

## 2022-07-12 RX ADMIN — METOPROLOL TARTRATE 12.5 MG: 25 TABLET, FILM COATED ORAL at 20:48

## 2022-07-12 RX ADMIN — INSULIN LISPRO 4 UNITS: 100 INJECTION, SOLUTION INTRAVENOUS; SUBCUTANEOUS at 17:15

## 2022-07-12 RX ADMIN — BUMETANIDE 1 MG: 0.25 INJECTION, SOLUTION INTRAMUSCULAR; INTRAVENOUS at 14:42

## 2022-07-12 RX ADMIN — Medication 10 ML: at 20:48

## 2022-07-12 RX ADMIN — INSULIN LISPRO 4 UNITS: 100 INJECTION, SOLUTION INTRAVENOUS; SUBCUTANEOUS at 11:55

## 2022-07-12 RX ADMIN — Medication 10 ML: at 08:42

## 2022-07-12 RX ADMIN — METOPROLOL TARTRATE 50 MG: 50 TABLET, FILM COATED ORAL at 08:41

## 2022-07-12 RX ADMIN — BUMETANIDE 1 MG: 0.25 INJECTION, SOLUTION INTRAMUSCULAR; INTRAVENOUS at 20:48

## 2022-07-12 RX ADMIN — SODIUM CHLORIDE 1 G: 900 INJECTION INTRAVENOUS at 13:20

## 2022-07-12 RX ADMIN — ANTACID TABLETS 1 TABLET: 500 TABLET, CHEWABLE ORAL at 04:09

## 2022-07-12 RX ADMIN — SODIUM CHLORIDE 125 MG: 9 INJECTION, SOLUTION INTRAVENOUS at 11:55

## 2022-07-12 RX ADMIN — RIVAROXABAN 15 MG: 15 TABLET, FILM COATED ORAL at 17:20

## 2022-07-12 NOTE — CONSULTS
Nurse Navigator has received consult and reviewed chart.  Patient with no history of COPD and never smoked.  NN will sign off.  Please feel free to consult again if I can be of assistance.  Thank you.

## 2022-07-12 NOTE — CONSULTS
[facility] [place] [facility] Consult    Referring Provider: Bernice Mckinley DO   PCP: Christiane Peter APRN    Reason for Consultation: Ascites     Chief complaint: Lower extremity edema     History of present illness:    Ivory Carr is a 76 y.o. female who is admitted with lower extremity edema and abdominal distention.   She is known to our inpatient gastroenterology service from previous evaluation in 2020 when she underwent a diagnostic paracentesis for ascites at that time that was consistent with cardiac ascites (high SAAG, high protein) secondary to right sided heart failure from severe TR.       She describes progressive lower extremity edema with pain and difficulty walking for one month.   She has noticed abdominal distention for two weeks.   She denies any personal history of liver disease nor family history.   She does not drink any alcohol.       Allergies:  Statins    Scheduled Meds:  bumetanide, 1 mg, Intravenous, Q12H  cefTRIAXone, 1 g, Intravenous, Q24H  ferric gluconate, 125 mg, Intravenous, Daily  insulin lispro, 0-7 Units, Subcutaneous, TID AC  metOLazone, 5 mg, Oral, Once  metoprolol tartrate, 12.5 mg, Oral, BID  rivaroxaban, 15 mg, Oral, Daily With Dinner  sodium chloride, 10 mL, Intravenous, Q12H         Infusions:       PRN Meds:  •  acetaminophen **OR** acetaminophen **OR** acetaminophen  •  albuterol  •  calcium carbonate  •  dextrose  •  dextrose  •  glucagon (human recombinant)  •  sodium chloride    Home Meds:  Medications Prior to Admission   Medication Sig Dispense Refill Last Dose   • furosemide (LASIX) 20 MG tablet Take 40 mg by mouth Daily.   7/11/2022 at Unknown time   • insulin glargine (LANTUS) 100 UNIT/ML injection Inject 50 Units under the skin into the appropriate area as directed Daily.   7/11/2022 at Unknown time   • insulin regular (humuLIN R,novoLIN R) 100 UNIT/ML injection Inject 10 Units under the skin into the appropriate area as directed 3 (Three) Times a Day Before Meals.  Pt uses sliding scale   7/11/2022 at Unknown time   • Jardiance 25 MG tablet tablet Take 1 tablet by mouth Daily.   7/11/2022 at Unknown time   • lisinopril (PRINIVIL,ZESTRIL) 20 MG tablet Take 1 tablet by mouth Daily. (Patient taking differently: Take 10 mg by mouth Daily.) 30 tablet 11 7/11/2022 at Unknown time   • metoprolol tartrate (LOPRESSOR) 50 MG tablet Take 1 tablet by mouth 2 (Two) Times a Day. 60 tablet 6 7/11/2022 at Unknown time   • NovoLIN 70/30 (70-30) 100 UNIT/ML injection Inject 20 Units under the skin into the appropriate area as directed 3 (Three) Times a Day.   7/11/2022 at Unknown time   • rivaroxaban (XARELTO) 20 MG tablet Take 1 tablet by mouth Daily.   7/11/2022 at Unknown time       ROS: Review of Systems   Constitutional: Positive for appetite change and fatigue.   HENT: Negative.    Respiratory: Negative.    Cardiovascular: Positive for leg swelling.   Gastrointestinal: Positive for abdominal distention. Negative for abdominal pain.   Endocrine: Negative.    Genitourinary: Negative.    Musculoskeletal: Negative.    Skin: Negative.    Allergic/Immunologic: Negative.    Neurological: Positive for weakness.   Psychiatric/Behavioral: Positive for sleep disturbance.       PAST MED HX:  Past Medical History:   Diagnosis Date   • ASD (atrial septal defect)    • Atrial flutter (HCC) 9/24/2020    Added automatically from request for surgery [id number]   • CKD (chronic kidney disease) 10/6/2020   • Diabetes (HCC)        PAST SURG HX:  Past Surgical History:   Procedure Laterality Date   • CARDIAC CATHETERIZATION N/A 10/9/2020    Procedure: Right Heart Cath;  Surgeon: Irvin Mccray MD;  Location:  [facility] CATH INVASIVE LOCATION;  Service: Cardiology;  Laterality: N/A;   • CARDIAC ELECTROPHYSIOLOGY PROCEDURE N/A 10/15/2020    Procedure: Ablation atrial flutter;  Surgeon: Joseph Garcia DO;  Location:  [facility] EP INVASIVE LOCATION;  Service: Cardiovascular;  Laterality: N/A;   • CHOLECYSTECTOMY    "      FAM HX:  Family History   Problem Relation Age of Onset   • Hypertension Mother    • Hyperlipidemia Mother    • Diabetes Mother    • Emphysema Father    • Other Son         HEART PROBLEMS   • Diabetes Sister    • Other Brother         HEART PROBLEMS   • Lung disease Sister    • No Known Problems Sister    • Diabetes Sister    • Diabetes Sister    • Other Brother         HEART PROBLEMS   • Other Brother         HEART PROBLEMS   • Other Brother         HEART PROBLEMS   • Other Brother         HEART PROBLEMS       SOC HX:  Social History     Socioeconomic History   • Marital status:    Tobacco Use   • Smoking status: Never Smoker   • Smokeless tobacco: Never Used   Substance and Sexual Activity   • Alcohol use: Never   • Drug use: Never   • Sexual activity: Defer       PHYSICAL EXAM  /68 (BP Location: Left arm, Patient Position: Lying)   Pulse 64   Temp 97.7 °F (36.5 °C) (Oral)   Resp 18   Ht 170.2 cm (67\")   Wt 89.8 kg (198 lb)   SpO2 95%   BMI 31.01 kg/m²   Wt Readings from Last 3 Encounters:   07/12/22 89.8 kg (198 lb)   12/22/21 86.6 kg (191 lb)   11/08/21 81.6 kg (180 lb)   ,body mass index is 31.01 kg/m².  Physical Exam  Constitutional:       General: She is not in acute distress.  HENT:      Head: Normocephalic and atraumatic.      Mouth/Throat:      Mouth: Mucous membranes are moist.   Eyes:      General: No scleral icterus.  Cardiovascular:      Rate and Rhythm: Normal rate and regular rhythm.   Pulmonary:      Effort: Pulmonary effort is normal. No respiratory distress.   Abdominal:      General: Bowel sounds are normal. There is distension.      Palpations: Abdomen is soft.      Tenderness: There is no abdominal tenderness.   Musculoskeletal:      Right lower leg: Edema present.      Left lower leg: Edema present.      Comments: 2+ pitting edema of the lower extremities with tenderness to palpation, warmth and erythema   Skin:     General: Skin is warm and dry.   Neurological:      " Mental Status: She is alert and oriented to person, place, and time.   Psychiatric:         Behavior: Behavior normal.       Results Review:   I reviewed the patient's new clinical results.    Lab Results   Component Value Date    WBC 4.25 07/12/2022    HGB 10.8 (L) 07/12/2022    HGB 9.9 (L) 07/11/2022    HGB 11.9 (L) 12/22/2021    HCT 34.4 07/12/2022    MCV 84.9 07/12/2022     07/12/2022     Lab Results   Component Value Date    INR 2.03 (H) 07/11/2022    INR 2.20 (H) 10/06/2020     Lab Results   Component Value Date    GLUCOSE 101 (H) 07/12/2022    BUN 26 (H) 07/12/2022    CREATININE 1.50 (H) 07/12/2022    EGFRIFNONA 57 (L) 12/22/2021    BCR 17.3 07/12/2022     (L) 07/12/2022    K 3.8 07/12/2022    CO2 32.0 (H) 07/12/2022    CALCIUM 9.0 07/12/2022    ALBUMIN 3.90 07/12/2022    ALKPHOS 65 07/12/2022    BILITOT 0.6 07/12/2022    ALT 14 07/12/2022    AST 18 07/12/2022      Latest Reference Range & Units 07/11/22 16:48   proBNP 0.0 - 1,800.0 pg/mL 1,114.0      Latest Reference Range & Units 07/11/22 16:48   Hep A IgM Non-Reactive  Non-Reactive   Hepatitis B Surface Ag Non-Reactive  Non-Reactive   Hep B Core IgM Non-Reactive  Non-Reactive   Hepatitis C Ab Non-Reactive  Non-Reactive      Latest Reference Range & Units 07/12/22 04:57   Iron 37 - 145 mcg/dL 33 (L)   Ferritin 13.00 - 150.00 ng/mL 34.17   Iron Saturation 20 - 50 % 6 (L)   Transferrin 200 - 360 mg/dL 372 (H)   TIBC 298 - 536 mcg/dL 554 (H)     CT abdomen/pelvis with contrast:  Liver: Normal size and density. Surface nodularity is present. No focal  lesions identified. Trace amount of ascites is present within the upper  abdomen.   Gallbladder: The gallbladder appears normal in size with no evidence of  radiopaque gallstones. The biliary tree is nondilated.   Pancreas: No focal masses.  No pancreatic duct dilation.   Spleen: Spleen is normal size.  No focal splenic lesions.   Adrenal glands: Unremarkable.    Kidneys: The kidneys appear normal in  size. No evidence of  nephrolithiasis. No evidence of hydronephrosis. No suspicious focal  lesions identified.   Retroperitoneum/vascular: No retroperitoneal adenopathy identified. No  aneurysmal dilatation of the vascular structures. Atherosclerotic  calcifications are present.   Stomach and Bowel: No abnormally dilated loops of bowel no definite mass  identified. No significant bowel wall thickening. No free fluid. No  focal fluid collections identified. The appendix appears within normal  limits. No mesenteric adenopathy identified. No evidence of free air.   Bladder: The bladder appears unremarkable.   Pelvic Organs: A fibroid uterus is present. A small amount of free fluid  is present within the pelvis, improved as compared to the previous  study. There is diastases of the rectus abdominal musculature. There is  mild diffuse anasarca of the soft tissues.   Osseous structures: No aggressive focal lytic or sclerotic osseous  lesions. No acute osseous abnormality.   IMPRESSION:   1. Small amount of free fluid present within the pelvis, improved as  compared to the previous study. There is anasarca the soft tissues with  a trace amount of ascites present within the upper abdomen. Findings are  likely related to cirrhosis and fluid overload. No acute intra-abdominal  or pelvic process.  2. Ancillary findings as described above.    ASSESSMENTS/PLANS    1. Cardiac ascites   2. Severe tricuspid regurgitation   3. Congestive hepatopathy   4. Uncontrolled diabetes mellitus, A1c is 100     Personal reviewed CT films.   The liver appears to be normal.  There is marked reflux from IVC to hepatic veins.   Previous diagnostic paracentesis in 2020 was consistent with cardiac ascites.   Suspect congestive hepatopathy, cannot rule out cardiac cirrhosis.       The cornerstone of management of congestive hepatopathy is treatment of underlying heart disease.  Trace ascites should respond well to diuretics.   Care should be given to  avoid excess diuresis, which could impair hepatic perfusion and precipitate zone 3 necrosis.    >>> Diuretics per cardiology.    >>> Low sodium diet (< 2,000 mg daily)    We will sign off.   Please call for questions or concerns.       I discussed the patient's findings and my recommendations with patient and her  whom is at bedside.        JANNA Orozco  07/12/22  12:57 EDT

## 2022-07-12 NOTE — PLAN OF CARE
Goal Outcome Evaluation:  Plan of Care Reviewed With: patient, spouse           Outcome Evaluation: PT initial Eval completed.  Pt presents with generalized weakness, dizziness, balance deficits, decreased functional endurance, and decreased indep/safety with functional mobility.  Ambulated 10ft with FWW and CGA progressing to Calixto.  Limited by c/o progressive dizziness and B ankle pain with standing and ambulation.  BP decreased from standing 125/81 to 112/69 following ambulation and transfer to sitting in chair.  Pt warrants skilled IP PT to improve indep with mobility and return to PLOF.  Rec IPR upon d/c for best fxl outcome.

## 2022-07-12 NOTE — CONSULTS
"  Ivory Carr  [id number]  1945   LOS: 1 day   Patient Care Team:  HEALTHCARE PROVIDER: TEAGAN Fall   ELECTROPHYSIOLOGIST: Joseph Garcia DO, [facility], [facility]  FORMER CARDIOLOGISTS: Rolan De León MD / Irvin Mccray MD, [facility], [facility]    Mrs. Carr is a 76-year-old  white female from [place], [place], homemaker.    Chief Complaint:  SOB    Problem List:  1. Paroxysmal atrial fibrillation/flutter  a. CHADsVasc 5, diagnosed approximately 2017, anticoagulated with Xarelto  b. ECV x3 with the last in March 2020  c. Echocardiogram July 2021: Biatrial enlargement, normal LV systolic function with abnormal septal motion; moderately enlarged RV with normal contractility, mild MR, mild TR  d. Recurrent atrial flutter with RVR August and September 2020  e. EPS + typical right atrial flutter ablation October 2020  f. Successful BLANCA/ECV 12/22/2021: LVEF 56-60%, no evidence of ISABELLE thrombus.  Interatrial septal aneurysm present.  Septum appears previously surgically repaired.  Moderate MAC, mild MR with a centrally directed jet noted.  Mild to moderate mitral stenosis, variable mitral valve mean gradient secondary to atrial fibrillation measured 8 mmHg.  Valve area by pressure half-time 2.6 cm².  Severe TR, RVSP greater than 55 mmHg, aortic valve exhibits sclerotic changes.  There is a small 3 mm mobile mass identified on the noncoronary cusp.  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 Possible recurrent atrial flutter July 2022 in the setting of volume overload/ascites/pulmonary hypertension/cirrhosis  h. Pending echocardiogram July 2022  2. Hypertension  3. Remote ASD  a. Status post ASD repair in 1990 in [place]-data deficit  b. Echocardiogram 10/6/2020: LVEF 56-60%, ALFONSO with severe TR.  Septal motion suggest RV volume overload.  Septal \"patch\" with no shunting by agitated saline.  PA pressure normal to slightly elevated, RA pressure elevated.  Mild MR, aortic " valve is functionally normal  4. Pulmonary hypertension-probable WHO group 2-3  a. Right heart catheterization 10/9/2020: Mild pulmonary hypertension at rest, pulmonary vascular resistance normal, severe TR with elevated mean right atrial pressure, pulmonary capillary wedge pressure elevated, recommendations for only option of diuresis and RFA of atrial flutter  b. Regadenoson stress test 12/20/2021: LVEF 52%, abnormal acceptable probably negative IV Lexiscan Cardiolite cardiac CT scan stress study suggestive of low probability for significant focal obstructive CAD, and persistent atrial fibrillation  c. CCS class I chest discomfort/NYHA class II dyspnea on exertion symptoms  5. Uncontrolled type 2 diabetes mellitus; hemoglobin A1c 10.6% July 2022  6. Chronic kidney disease stage IIIa  7. Anemia  8. Anasarca/lower extremity edema/ascites   a. Paracentesis in 2020 with minimal relief  b. CT abdomen/pelvis with abnormal liver nodularity/ascites without definite diagnosis of cirrhosis July 2022  9. Mild obesity, BMI 31.04  10. Surgical history:  a. ASD repair  b. Flutter ablation  c. Cholecystectomy      Allergies   Allergen Reactions   • Statins Myalgia     Medications Prior to Admission   Medication Sig Dispense Refill Last Dose   • furosemide (LASIX) 20 MG tablet Take 40 mg by mouth Daily.   7/11/2022 at Unknown time   • insulin glargine (LANTUS) 100 UNIT/ML injection Inject 50 Units under the skin into the appropriate area as directed Daily.   7/11/2022 at Unknown time   • insulin regular (humuLIN R,novoLIN R) 100 UNIT/ML injection Inject 10 Units under the skin into the appropriate area as directed 3 (Three) Times a Day Before Meals. Pt uses sliding scale   7/11/2022 at Unknown time   • Jardiance 25 MG tablet tablet Take 1 tablet by mouth Daily.   7/11/2022 at Unknown time   • lisinopril (PRINIVIL,ZESTRIL) 20 MG tablet Take 1 tablet by mouth Daily. (Patient taking differently: Take 10 mg by mouth Daily.) 30 tablet  11 7/11/2022 at Unknown time   • metoprolol tartrate (LOPRESSOR) 50 MG tablet Take 1 tablet by mouth 2 (Two) Times a Day. 60 tablet 6 7/11/2022 at Unknown time   • NovoLIN 70/30 (70-30) 100 UNIT/ML injection Inject 20 Units under the skin into the appropriate area as directed 3 (Three) Times a Day.   7/11/2022 at Unknown time   • rivaroxaban (XARELTO) 20 MG tablet Take 1 tablet by mouth Daily.   7/11/2022 at Unknown time     Scheduled Meds:insulin lispro, 0-7 Units, Subcutaneous, TID AC  metoprolol tartrate, 50 mg, Oral, BID  rivaroxaban, 20 mg, Oral, Daily With Dinner  sodium chloride, 10 mL, Intravenous, Q12H           History of Present Illness:   This is a 76-year-old white female who presented to [facility] 7/11/2022 after being recommended by her physician to come to the hospital for increased lower extremity swelling and abdominal distention.  Patient had mild pain in her lower abdomen and was short of breath and dizzy with exertion.  She has not had any dizziness if she is not walking around.  She is compliant with her home Lasix but says that it has not been as effective lately.  She also has dysuria with decreased frequency.  Patient has had poor p.o. intake because of abdominal bloating and pain but denied any constipation, nausea, or vomiting.  She does not normally weigh herself consistently at home but had noticed when she went to the doctor recently that she had gained a lot of weight.  She felt she had fever and chills last week but did not check her temperature.  She had significant pitting edema in her lower extremities and ascites/anasarca on CTA abdomen/pelvis.  She is unaware of her atrial flutter and did not know that she was in it when she presented to [facility] ED.  She denies any worsening orthopnea or sputum production, particularly after being given Lasix in the ED.  Her CT abdomen/pelvis showed findings related to cirrhosis/volume overload.  On admission the patient's creatinine was 1.44, hemoglobin  9.9, troponin 0.053, proBNP 1114, chest x-ray showing small right-sided pleural effusion.  The patient is rather sedentary at home.  She says that over the past 2 weeks the most activity she has done is peeling vegetables.  She has a walker to use at home but is not overly active.  She says that she has children and grandchildren that help her with her housework.  Her great granddaughter is having heart surgery at [facility] currently today.  On last echocardiogram December 2021 patient had normal EF, mild to moderate mitral stenosis, moderate MAC with mild MR with a centrally directed jet, RVSP greater than 55 mmHg, aortic valve with sclerotic changes.  Her hemoglobin A1c was 10.6%.  She was given 80 mg IV Lasix in the ED.  D-dimer was 3.02 but CTA chest negative for PE.  Her lisinopril was held due to KIMMIE and dizziness.  GI was consulted due to the patient's cirrhosis.  She has a pending echocardiogram.  She had a successful BLANCA/ECV in December 2021.  Remotely she had an ASD repair in the 1990s.  She has been compliant with her Xarelto at home but has not received any today.  She denies any chest pain but does have shortness of breath and dizziness on exertion.  She had an episode recently when she was at home and walking around and got short of breath and dizzy and thought that she may pass out but she didn't.  She has already eaten today but is supposed to have an ultrasound of her liver in the morning.    Cardiac risk factors: advanced age (older than 55 for men, 65 for women), diabetes mellitus, hypertension, obesity (BMI >= 30 kg/m2) and sedentary lifestyle.    Social History     Socioeconomic History   • Marital status:    Tobacco Use   • Smoking status: Never Smoker   • Smokeless tobacco: Never Used   Substance and Sexual Activity   • Alcohol use: Never   • Drug use: Never   • Sexual activity: Defer     Family History   Problem Relation Age of Onset   • Hypertension Mother    •  "Hyperlipidemia Mother    • Diabetes Mother    • Emphysema Father    • Other Son         HEART PROBLEMS   • Diabetes Sister    • Other Brother         HEART PROBLEMS   • Lung disease Sister    • No Known Problems Sister    • Diabetes Sister    • Diabetes Sister    • Other Brother         HEART PROBLEMS   • Other Brother         HEART PROBLEMS   • Other Brother         HEART PROBLEMS   • Other Brother         HEART PROBLEMS       Review of Systems  10 point review of systems was completed, positives outlined in the HPI, and otherwise all other systems are negative.      Objective:       Physical Exam  /60   Pulse 77   Temp 97.7 °F (36.5 °C) (Oral)   Resp 18   Ht 170.2 cm (67\")   Wt 89.9 kg (198 lb 3.2 oz)   SpO2 97%   BMI 31.04 kg/m²       07/11/22  1627 07/12/22  0300   Weight: 87.5 kg (193 lb) 89.9 kg (198 lb 3.2 oz)     Body mass index is 31.04 kg/m².  No intake or output data in the 24 hours ending 07/12/22 1036    General Appearance:  Alert, cooperative, no distress, appears stated age   Head:  Normocephalic, without obvious abnormality, atraumatic   Neck: Supple, symmetrical, trachea midline, no adenopathy, thyroid: not enlarged, symmetric, no tenderness/mass/nodules, no carotid bruit or JVD   Lungs:    Bibasilar rales to auscultation bilaterally, respirations unlabored   Heart:  Regular rate and rhythm, S1, S2 normal, grade 2/6 murmur, rub or gallop   Abdomen:    Mildly distended, nontender, no masses, no organomegaly, bowel sounds audible x4   Extremities: 2+ edema, normal range of motion   Pulses: 2+ and symmetric   Skin: Skin color, texture, turgor normal, no rashes or lesions   Neurologic: Normal     · Cardiographics:    · EKG 7/11/2022:  Atrial flutter  Right axis deviation  Nonspecific intraventricular block  Cannot rule out Septal infarct (cited on or before 06-OCT-2020)  T wave abnormality, consider lateral ischemia  Abnormal ECG  When compared with ECG of 22-DEC-2021 13:52,  Significant " changes have occurred    · Imaging:    · Chest x-ray 7/11/2022:  Small right-sided pleural effusion with overlying atelectasis, similar  as compared to the previous study from 10/06/2020.    · CTA chest/abdomen/pelvis 7/11/2022:  1. No evidence of pulmonary embolism.  2. Small to moderate-sized right-sided pleural effusion likely related  to fluid overload.  3. Ancillary findings as described above.  4. Small amount of free fluid present within the pelvis, improved as  compared to the previous study. There is anasarca the soft tissues with  a trace amount of ascites present within the upper abdomen. Findings are  likely related to cirrhosis and fluid overload. No acute intra-abdominal  or pelvic process.  5. Ancillary findings as described above.     Lab Review:     Results from last 7 days   Lab Units 07/12/22 0457 07/11/22 2123 07/11/22  1648   SODIUM mmol/L 134*  --  132*   POTASSIUM mmol/L 3.8  --  4.2   CHLORIDE mmol/L 93*  --  93*   CO2 mmol/L 32.0*  --  27.0   BUN mg/dL 26*  --  28*   CREATININE mg/dL 1.50*  --  1.44*   GLUCOSE mg/dL 101*  --  237*   CALCIUM mg/dL 9.0 8.8 9.6     Results from last 7 days   Lab Units 07/12/22  0456 07/11/22  1648   WBC 10*3/mm3 4.25 5.05   HEMOGLOBIN g/dL 10.8* 9.9*   HEMATOCRIT % 34.4 31.0*   PLATELETS 10*3/mm3 207 207     Results from last 7 days   Lab Units 07/12/22  0456   HEMOGLOBIN A1C % 10.60*     Results from last 7 days   Lab Units 07/12/22  0457 07/11/22 2123 07/11/22  1648   TROPONIN T ng/mL 0.055* 0.044* 0.053*     · D-dimer 3.02      Assessment:   Patient with possible paroxysmal atrial flutter in the setting of anasarca/volume overload/cirrhosis/ascites, KIMMIE, pulmonary hypertension, and uncontrolled type 2 diabetes mellitus.  CTA chest negative for PE but patient does have small to moderate right pleural effusion.  On last echocardiogram December 2021 patient had normal EF, mild to moderate mitral stenosis, moderate MAC with mild MR with a centrally directed  jet, RVSP greater than 55 mmHg, aortic valve with sclerotic changes.  We feel that the patient's elevated troponins are likely a type II mechanism due to demand ischemia.  She will have a liver ultrasound in the morning 7/13/2022 to assess for portal hypertension.  May consider stress test at a later date in view of elevated troponin readings but this will be after other issues resolved. We feel that the patient's ECG shows SR with 1st degree block and not atrial flutter and ECG was misread. We think all of this represents right heart failure from her severe TR that was left over after ASD repair. Would restart xarelto after GI evaluation if they feel no procedures need to be completed.      Plan:   1.  Review echocardiogram results when available  2.  Defer MPS/LHC at this time  3.  Concur with GI consultation  4.  IV Bumex 1 mg twice daily  5.  Metolazone 5 mg times once  6.  Renal function panel, magnesium, ECG in morning  7.  Diabetes management per hospitalist  8.  Review liver ultrasound results when available  9.  Defer thoracentesis and paracentesis after reviewing CT abd/pelvis  10. Restart xarelto after GI evaluation if they feel no procedures need to be completed  11. Defer QTc prolonging agents  12. Consider PT consultation for lower extremity leg wraps   13. Will reduce dose of metoprolol tartrate in view of marginal systolic blood pressure; IF systolic blood pressure remains low would consider empiric low-dose midodrine 2.5-5 mg TID  14. Needs evaluation and treatment of anemia    Discussed with patient and family member in room.    Scribed for Sha Tiwari MD by Francine Emmanuel, APRN. 7/12/2022  11:46 EDT     I have seen and examined the patient, case was discussed with the physician extender, reviewed the above note, necessary changes were made and I agree with the final note.     Sha Tiwari MD [facility]

## 2022-07-12 NOTE — PROGRESS NOTES
[facility] Medicine Services  PROGRESS NOTE    Patient Name: Ivory Carr  : 1945  MRN: [id number]    Date of Admission: 2022  Primary Care Physician: Christiane Peter APRN    Subjective   Subjective     CC:  LE Edema    HPI:  Patient lying in bed with  at bedside. She states she is feeling better than she did on admission and had a good breakfast. Patient states she continues to have early satiety and always feels bloated.  Dizziness is improved this am.    ROS:  Gen- No fevers, chills  CV- No chest pain, palpitations  Resp- No cough, dyspnea  GI- No N/V/D, abd pain, +abdominal distention and fullness    Objective   Objective     Vital Signs:   Temp:  [97.6 °F (36.4 °C)-98 °F (36.7 °C)] 97.7 °F (36.5 °C)  Heart Rate:  [70-98] 77  Resp:  [16-18] 18  BP: (122-142)/(60-69) 124/60     Physical Exam:  Constitutional: No acute distress, awake, alert  HENT: NCAT, mucous membranes moist  Respiratory: Clear to auscultation bilaterally, respiratory effort normal   Cardiovascular:  Regular rate, irregularly irregular rhythm, +2/6 systolic murmur, rubs, or gallops  Gastrointestinal: Positive bowel sounds, soft, mild diffuse tenderness, moderately distended  Musculoskeletal: 1+ bilateral ankle edema  Psychiatric: Appropriate affect, cooperative  Neurologic: Oriented x 3, strength symmetric in all extremities, Cranial Nerves grossly intact to confrontation, speech clear  Skin: No rashes    Results Reviewed:  LAB RESULTS:      Lab 22  0456 22  2123 22  1648   WBC 4.25  --  5.05   HEMOGLOBIN 10.8*  --  9.9*   HEMATOCRIT 34.4  --  31.0*   PLATELETS 207  --  207   NEUTROS ABS 2.56  --  3.23   IMMATURE GRANS (ABS) 0.01  --  0.01   LYMPHS ABS 1.22  --  1.40   MONOS ABS 0.36  --  0.34   EOS ABS 0.08  --  0.05   MCV 84.9  --  84.9   PROCALCITONIN  --   --  0.11   LDH  --  283*  --    PROTIME  --   --  22.9*   D DIMER QUANT  --   --  3.02*         Lab 22  0457  07/12/22 0456 07/11/22 2123 07/11/22  1648   SODIUM 134*  --   --  132*   POTASSIUM 3.8  --   --  4.2   CHLORIDE 93*  --   --  93*   CO2 32.0*  --   --  27.0   ANION GAP 9.0  --   --  12.0   BUN 26*  --   --  28*   CREATININE 1.50*  --   --  1.44*   EGFR 35.5*  --   --  37.3*   GLUCOSE 101*  --   --  237*   CALCIUM 9.0  --  8.8 9.6   MAGNESIUM 2.2  --   --  2.1   PHOSPHORUS 4.8*  --   --   --    HEMOGLOBIN A1C  --  10.60*  --   --    TSH  --   --  3.730  --          Lab 07/12/22 0457 07/11/22  1648   TOTAL PROTEIN 7.1 7.0   ALBUMIN 3.90 3.70   GLOBULIN 3.2 3.3   ALT (SGPT) 14 13   AST (SGOT) 18 21   BILIRUBIN 0.6 0.4   ALK PHOS 65 64         Lab 07/12/22 0457 07/11/22 2123 07/11/22  1648   PROBNP  --   --  1,114.0   TROPONIN T 0.055* 0.044* 0.053*   PROTIME  --   --  22.9*   INR  --   --  2.03*             Lab 07/12/22 0457   IRON 33*   IRON SATURATION 6*   TIBC 554*   TRANSFERRIN 372*   FERRITIN 34.17         Brief Urine Lab Results  (Last result in the past 365 days)      Color   Clarity   Blood   Leuk Est   Nitrite   Protein   CREAT   Urine HCG        07/12/22 0022 Yellow   Clear   Negative   Trace   Positive   Negative                 Microbiology Results Abnormal     Procedure Component Value - Date/Time    COVID PRE-OP / PRE-PROCEDURE SCREENING ORDER (NO ISOLATION) - Swab, Nasopharynx [[id number]]  (Normal) Collected: 07/11/22 2119    Lab Status: Final result Specimen: Swab from Nasopharynx Updated: 07/11/22 2245    Narrative:      The following orders were created for panel order COVID PRE-OP / PRE-PROCEDURE SCREENING ORDER (NO ISOLATION) - Swab, Nasopharynx.  Procedure                               Abnormality         Status                     ---------                               -----------         ------                     COVID-19 and FLU A/B PCR...[[id number]]  Normal              Final result                 Please view results for these tests on the individual orders.    COVID-19 and FLU A/B  PCR - Swab, Nasopharynx [[id number]]  (Normal) Collected: 07/11/22 2119    Lab Status: Final result Specimen: Swab from Nasopharynx Updated: 07/11/22 2245     COVID19 Not Detected     Influenza A PCR Not Detected     Influenza B PCR Not Detected    Narrative:      Fact sheet for providers: https://www.fda.gov/media/405356/download    Fact sheet for patients: https://www.fda.gov/media/367437/download    Test performed by PCR.          CT Abdomen Pelvis With Contrast    Result Date: 7/11/2022  CT ABDOMEN PELVIS W CONTRAST-  Date of Exam: 7/11/2022 8:59 PM  Indication: Abdominal pain, acute (Ped 0-17y).  Comparison: CT 10/07/2020  Technique: Contiguous axial CT images were obtained from the lung bases to the superior iliac crests with contrast.  Following uneventful administration of 100 cc of Isovue-370 intravenous and oral contrast, contiguous axial images obtained from lung bases to the pubic symphysis. Sagittal and coronal reconstructions were performed.  Automated exposure control and iterative reconstruction methods were used.  FINDINGS: Lower Thorax: Please see chest CT  Liver: Normal size and density. Surface nodularity is present. No focal lesions identified. Trace amount of ascites is present within the upper abdomen.  Gallbladder: The gallbladder appears normal in size with no evidence of radiopaque gallstones. The biliary tree is nondilated.  Pancreas: No focal masses.  No pancreatic duct dilation.  Spleen: Spleen is normal size.  No focal splenic lesions.  Adrenal glands: Unremarkable.  Kidneys: The kidneys appear normal in size. No evidence of nephrolithiasis. No evidence of hydronephrosis. No suspicious focal lesions identified.  Retroperitoneum/vascular: No retroperitoneal adenopathy identified. No aneurysmal dilatation of the vascular structures. Atherosclerotic calcifications are present.  Stomach and Bowel: No abnormally dilated loops of bowel no definite mass identified. No significant bowel wall  thickening. No free fluid. No focal fluid collections identified. The appendix appears within normal limits. No mesenteric adenopathy identified. No evidence of free air.  Bladder: The bladder appears unremarkable.  Pelvic Organs: A fibroid uterus is present. A small amount of free fluid is present within the pelvis, improved as compared to the previous study. There is diastases of the rectus abdominal musculature. There is mild diffuse anasarca of the soft tissues.  Osseous structures: No aggressive focal lytic or sclerotic osseous lesions. No acute osseous abnormality.      Impression:  1. Small amount of free fluid present within the pelvis, improved as compared to the previous study. There is anasarca the soft tissues with a trace amount of ascites present within the upper abdomen. Findings are likely related to cirrhosis and fluid overload. No acute intra-abdominal or pelvic process. 2. Ancillary findings as described above.  This report was finalized on 7/11/2022 9:17 PM by Lisa Beltre MD.      XR Chest 1 View    Result Date: 7/11/2022  Examination: XR CHEST 1 VW-  Date of Exam: 7/11/2022 5:47 PM  Indication: SOA triage protocol.  Comparison: Radiograph 10/06/2020  Technique: 1 view of the chest  Findings: There is a small right-sided pleural effusion. Mild atelectatic changes are present within the right lung base. Median sternotomy wires are present. No pneumothorax. The heart size is normal. The pulmonary vasculature appears within normal limits. No acute osseous abnormality identified.      Impression: Small right-sided pleural effusion with overlying atelectasis, similar as compared to the previous study from 10/06/2020.  This report was finalized on 7/11/2022 6:15 PM by Lisa Beltre MD.      CT Angiogram Chest    Result Date: 7/11/2022  DATE OF EXAM: 7/11/2022 8:59 PM  PROCEDURE: CT ANGIOGRAM CHEST-  INDICATIONS: pulmonary embolism rule out; I48.3-Typical atrial flutter; R60.9-Edema, unspecified;  E87.70-Fluid overload, unspecified; I50.9-Heart failure, unspecified; R77.8-Other specified abnormalities of plasma proteins  COMPARISON: No comparisons available.  TECHNIQUE: Contiguous axial imaging was obtained from the thoracic inlet through the upper abdomen following the intravenous administration of 100 mL of Isovue 370. Reconstructed coronal and sagittal images were also obtained. Automated exposure control and iterative reconstruction methods were used.  The radiation dose reduction device was turned on for each scan per the ALARA (As Low as Reasonably Achievable) protocol.  FINDINGS: There is no evidence of pulmonary embolism. The heart appears normal in size. A small to moderate-sized right-sided pleural effusion is present. Atherosclerotic calcifications are present. No focal consolidations. Mild scarring is present within the lung bases bilaterally. Median sternotomy wires are present. No pericardial effusion identified. No evidence of adenopathy. No acute osseous abnormality identified. Mild degenerative changes are present within the spine.       Impression:  1. No evidence of pulmonary embolism. 2. Small to moderate-sized right-sided pleural effusion likely related to fluid overload. 3. Ancillary findings as described above.  This report was finalized on 7/11/2022 9:19 PM by Lisa Beltre MD.        Results for orders placed during the hospital encounter of 12/22/21    Adult Transesophageal Echo ([facility]ANCA) W/ Cont if Necessary Per Protocol    Interpretation Summary  · Left ventricular ejection fraction appears to be 56 - 60%  · No evidence of a left atrial appendage thrombus was present.  · Interatrial septal aneurysm present. Septum appears previously surgically repaired  · Moderate mitral annular calcification is present. Mild mitral valve regurgitation is present with a centrally-directed jet noted  · Mild to moderate mitral valve stenosis is present.  · Variable mitral valve mean gradient secondary to  atrial fibrillation. Measured 8 mmHg. Valve area by pressure half-time 2.6 cm²  · Severe tricuspid valve regurgitation is present. Estimated right ventricular systolic pressure from tricuspid regurgitation is markedly elevated (>55 mmHg).  · The aortic valve exhibits sclerotic changes. There is a small, 3 mm, mobile mass identified on the noncoronary cusp. 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      I have reviewed the medications:  Scheduled Meds:ferric gluconate, 125 mg, Intravenous, Daily  insulin lispro, 0-7 Units, Subcutaneous, TID AC  metoprolol tartrate, 50 mg, Oral, BID  rivaroxaban, 20 mg, Oral, Daily With Dinner  sodium chloride, 10 mL, Intravenous, Q12H      Continuous Infusions:   PRN Meds:.•  acetaminophen **OR** acetaminophen **OR** acetaminophen  •  albuterol  •  calcium carbonate  •  dextrose  •  dextrose  •  glucagon (human recombinant)  •  sodium chloride    Assessment & Plan   Assessment & Plan     Active Hospital Problems    Diagnosis  POA   • **Acute on chronic respiratory failure (HCC) [J96.20]  Unknown   • KIMMIE (acute kidney injury) (HCC) [N17.9]  Unknown   • Typical atrial flutter (HCC) [I48.3]  Yes   • Elevated troponin [R77.8]  Unknown   • Essential hypertension [I10]  Yes   • Diabetes (HCC) [E11.9]  Yes   • Anemia [D64.9]  Yes   • Stage 3a chronic kidney disease (HCC) [N18.31]  Yes   • Anasarca and ascites [R60.1]  Unknown      Resolved Hospital Problems   No resolved problems to display.        Brief Hospital Course to date:  Ivory Carr is a 76 y.o. female with past medical history of a flutter on Xarelto status post ablation 2020, diabetes type 2, CKD stage III, severe tricuspid regurgitation, cirrhosis, and hypertension who presented to the ED from primary care provider with increased bilateral lower extremity swelling as well  as abdominal distention. CT in the ED showed anasarca likely related to cirrhosis. Patient admitted to the hospitalist for further workup.    This patient's problems and plans were partially entered by my partner and updated as appropriate by me 07/12/22.    All problems are new to me today.    Acute on Chronic Respiratory Failure  Elevated troponin  -Trop elevated on admission, trended and stable  -Xray shows small right-sided pleural effusion, mild atelectatic changes in right lung base  -80mg IV lasix in ED  -SOA on exertion  -D dimer 3.02  -CTA chest negative for PE; Small to moderate-sized right-sided pleural effusion likely related to fluid overload seen.  -Albuterol nebs PRN   -hx of severe tricuspid regurg.   - ECHO pending  - consult cardiology     Cirrhosis/anasacrca/Lower extremity edema/Ascites  -Previous paracentesis in 2020/minimal relief   -CT abd/pelvis: Small amount of free fluid present within the pelvis, improved as  compared to the previous study. There is anasarca the soft tissues with  a trace amount of ascites present within the upper abdomen. Findings are  likely related to cirrhosis and fluid overload.   -repeat echo pending  -Consult GI, needs to establish care with GI for management of her cirrhosis   - Hepatitis panel negative  - PT/ INR: 22.9/2.03- patient on Xarelto-change from 20mg to renally dosed 15mg PO daily  - TIEN antimicrosomal antibodies, ferritin, hemochromatosis mutation, anti-smooth muscle antibody titer pending  - Ig G and IgA stable  - liver ultrasound moved to tomorrow, as patient was not npo after midnight last night. NPO after midnight tonight.     Aflutter s/p ablation 2020  -Controlled with metoprolol  -Continue Xarelto- change from 20mg to renally dosed 15mg PO daily  -Follows with Dr. Garcia     Dysuria  - urine culture pending  - start Rocephin 1gm IV Q24H     Hyponatremia  - corrected sodium is 134   -monitor     Dizziness  - likely multifactorial-- severe tricuspid  regurg in the past/3rd spacing of fluid/anemic   -orthostatic q shift  - get repeat ECHO pending  -consult PT/OT  - consult cardiology      Diabetes  -SSI  -ACHS finger sticks  -HgA1C: 10.4     Acute on chronic kidney disease stage 3  -Cr 1.44 on admission, baseline 0.95-1.05  -Avoid nephrotoxic agents  -Strict I/Os  - consider nephrology consult if trends up tomorrow     HTN  -Hold lisinopril in setting of dizziness and KIMMIE      Chronic anemia  Iron deficiency anemia  -likely due to iron deficiency- iron level of 33. Replace IV daily x 3 days   -Hgb 9.9 on admission  -trend H&H     Previous history of hypothyroidism   - Patient states that she was previously on Synthroid and is concerned that she is no longer on Synthroid and request further evaluation of this is underlying cause of her edema.   - TSH stable at 3.73    Expected Discharge Location and Transportation: home via   Expected Discharge Date: 7/14/22    DVT prophylaxis:  Medical DVT prophylaxis orders are present.          CODE STATUS:   Code Status and Medical Interventions:   Ordered at: 07/11/22 5530     Level Of Support Discussed With:    Patient     Code Status (Patient has no pulse and is not breathing):    CPR (Attempt to Resuscitate)     Medical Interventions (Patient has pulse or is breathing):    Full Support       Bernice Mckinley DO  07/12/22

## 2022-07-13 ENCOUNTER — APPOINTMENT (OUTPATIENT)
Dept: ULTRASOUND IMAGING | Facility: HOSPITAL | Age: 77
End: 2022-07-13

## 2022-07-13 LAB
ALBUMIN SERPL-MCNC: 3.7 G/DL (ref 3.5–5.2)
ALBUMIN/GLOB SERPL: 1.2 G/DL
ALP SERPL-CCNC: 63 U/L (ref 39–117)
ALT SERPL W P-5'-P-CCNC: 11 U/L (ref 1–33)
ANION GAP SERPL CALCULATED.3IONS-SCNC: 9 MMOL/L (ref 5–15)
AST SERPL-CCNC: 15 U/L (ref 1–32)
BASOPHILS # BLD AUTO: 0.02 10*3/MM3 (ref 0–0.2)
BASOPHILS NFR BLD AUTO: 0.5 % (ref 0–1.5)
BILIRUB SERPL-MCNC: 0.4 MG/DL (ref 0–1.2)
BUN SERPL-MCNC: 29 MG/DL (ref 8–23)
BUN/CREAT SERPL: 18.7 (ref 7–25)
CALCIUM SPEC-SCNC: 9.1 MG/DL (ref 8.6–10.5)
CHLORIDE SERPL-SCNC: 90 MMOL/L (ref 98–107)
CO2 SERPL-SCNC: 30 MMOL/L (ref 22–29)
CREAT SERPL-MCNC: 1.55 MG/DL (ref 0.57–1)
DEPRECATED RDW RBC AUTO: 47.7 FL (ref 37–54)
EGFRCR SERPLBLD CKD-EPI 2021: 34.2 ML/MIN/1.73
EOSINOPHIL # BLD AUTO: 0.08 10*3/MM3 (ref 0–0.4)
EOSINOPHIL NFR BLD AUTO: 1.8 % (ref 0.3–6.2)
ERYTHROCYTE [DISTWIDTH] IN BLOOD BY AUTOMATED COUNT: 15.4 % (ref 12.3–15.4)
GLOBULIN UR ELPH-MCNC: 3 GM/DL
GLUCOSE BLDC GLUCOMTR-MCNC: 242 MG/DL (ref 70–130)
GLUCOSE BLDC GLUCOMTR-MCNC: 269 MG/DL (ref 70–130)
GLUCOSE BLDC GLUCOMTR-MCNC: 291 MG/DL (ref 70–130)
GLUCOSE SERPL-MCNC: 295 MG/DL (ref 65–99)
HAV AB SER QL IA: NEGATIVE
HCT VFR BLD AUTO: 33.4 % (ref 34–46.6)
HGB BLD-MCNC: 10.4 G/DL (ref 12–15.9)
IMM GRANULOCYTES # BLD AUTO: 0.01 10*3/MM3 (ref 0–0.05)
IMM GRANULOCYTES NFR BLD AUTO: 0.2 % (ref 0–0.5)
LKM-1 AB SER-ACNC: 1.1 UNITS (ref 0–20)
LYMPHOCYTES # BLD AUTO: 1.38 10*3/MM3 (ref 0.7–3.1)
LYMPHOCYTES NFR BLD AUTO: 31.4 % (ref 19.6–45.3)
MAGNESIUM SERPL-MCNC: 2.2 MG/DL (ref 1.6–2.4)
MCH RBC QN AUTO: 26.6 PG (ref 26.6–33)
MCHC RBC AUTO-ENTMCNC: 31.1 G/DL (ref 31.5–35.7)
MCV RBC AUTO: 85.4 FL (ref 79–97)
MONOCYTES # BLD AUTO: 0.45 10*3/MM3 (ref 0.1–0.9)
MONOCYTES NFR BLD AUTO: 10.3 % (ref 5–12)
NEUTROPHILS NFR BLD AUTO: 2.45 10*3/MM3 (ref 1.7–7)
NEUTROPHILS NFR BLD AUTO: 55.8 % (ref 42.7–76)
NRBC BLD AUTO-RTO: 0 /100 WBC (ref 0–0.2)
PHOSPHATE SERPL-MCNC: 4.3 MG/DL (ref 2.5–4.5)
PLATELET # BLD AUTO: 201 10*3/MM3 (ref 140–450)
PMV BLD AUTO: 11.1 FL (ref 6–12)
POTASSIUM SERPL-SCNC: 4.7 MMOL/L (ref 3.5–5.2)
PROT SERPL-MCNC: 6.7 G/DL (ref 6–8.5)
QT INTERVAL: 466 MS
QTC INTERVAL: 510 MS
RBC # BLD AUTO: 3.91 10*6/MM3 (ref 3.77–5.28)
SMA IGG SER-ACNC: 5 UNITS (ref 0–19)
SODIUM SERPL-SCNC: 129 MMOL/L (ref 136–145)
SODIUM SERPL-SCNC: 132 MMOL/L (ref 136–145)
WBC NRBC COR # BLD: 4.39 10*3/MM3 (ref 3.4–10.8)

## 2022-07-13 PROCEDURE — 25010000002 CEFTRIAXONE PER 250 MG: Performed by: HOSPITALIST

## 2022-07-13 PROCEDURE — 63710000001 INSULIN DETEMIR PER 5 UNITS: Performed by: HOSPITALIST

## 2022-07-13 PROCEDURE — 99232 SBSQ HOSP IP/OBS MODERATE 35: CPT | Performed by: HOSPITALIST

## 2022-07-13 PROCEDURE — 99232 SBSQ HOSP IP/OBS MODERATE 35: CPT | Performed by: INTERNAL MEDICINE

## 2022-07-13 PROCEDURE — 76705 ECHO EXAM OF ABDOMEN: CPT

## 2022-07-13 PROCEDURE — 25010000002 NA FERRIC GLUC CPLX PER 12.5 MG: Performed by: HOSPITALIST

## 2022-07-13 PROCEDURE — 82962 GLUCOSE BLOOD TEST: CPT

## 2022-07-13 PROCEDURE — 63710000001 INSULIN LISPRO (HUMAN) PER 5 UNITS

## 2022-07-13 PROCEDURE — 84295 ASSAY OF SERUM SODIUM: CPT | Performed by: NURSE PRACTITIONER

## 2022-07-13 PROCEDURE — 80053 COMPREHEN METABOLIC PANEL: CPT | Performed by: HOSPITALIST

## 2022-07-13 PROCEDURE — 97165 OT EVAL LOW COMPLEX 30 MIN: CPT

## 2022-07-13 PROCEDURE — 85025 COMPLETE CBC W/AUTO DIFF WBC: CPT | Performed by: HOSPITALIST

## 2022-07-13 PROCEDURE — 93010 ELECTROCARDIOGRAM REPORT: CPT | Performed by: INTERNAL MEDICINE

## 2022-07-13 PROCEDURE — 63710000001 INSULIN LISPRO (HUMAN) PER 5 UNITS: Performed by: HOSPITALIST

## 2022-07-13 PROCEDURE — 84100 ASSAY OF PHOSPHORUS: CPT | Performed by: NURSE PRACTITIONER

## 2022-07-13 PROCEDURE — 93005 ELECTROCARDIOGRAM TRACING: CPT | Performed by: NURSE PRACTITIONER

## 2022-07-13 PROCEDURE — 83735 ASSAY OF MAGNESIUM: CPT | Performed by: NURSE PRACTITIONER

## 2022-07-13 RX ORDER — INSULIN LISPRO 100 [IU]/ML
0-9 INJECTION, SOLUTION INTRAVENOUS; SUBCUTANEOUS
Status: DISCONTINUED | OUTPATIENT
Start: 2022-07-13 | End: 2022-07-15 | Stop reason: HOSPADM

## 2022-07-13 RX ADMIN — METOPROLOL TARTRATE 12.5 MG: 25 TABLET, FILM COATED ORAL at 09:31

## 2022-07-13 RX ADMIN — INSULIN LISPRO 4 UNITS: 100 INJECTION, SOLUTION INTRAVENOUS; SUBCUTANEOUS at 11:56

## 2022-07-13 RX ADMIN — SODIUM CHLORIDE 1 G: 900 INJECTION INTRAVENOUS at 11:56

## 2022-07-13 RX ADMIN — INSULIN LISPRO 6 UNITS: 100 INJECTION, SOLUTION INTRAVENOUS; SUBCUTANEOUS at 17:17

## 2022-07-13 RX ADMIN — METOPROLOL TARTRATE 12.5 MG: 25 TABLET, FILM COATED ORAL at 20:04

## 2022-07-13 RX ADMIN — RIVAROXABAN 15 MG: 15 TABLET, FILM COATED ORAL at 17:17

## 2022-07-13 RX ADMIN — BUMETANIDE 1 MG: 0.25 INJECTION, SOLUTION INTRAMUSCULAR; INTRAVENOUS at 09:31

## 2022-07-13 RX ADMIN — INSULIN DETEMIR 10 UNITS: 100 INJECTION, SOLUTION SUBCUTANEOUS at 11:56

## 2022-07-13 RX ADMIN — BUMETANIDE 1 MG: 0.25 INJECTION, SOLUTION INTRAMUSCULAR; INTRAVENOUS at 20:03

## 2022-07-13 RX ADMIN — SODIUM CHLORIDE 125 MG: 9 INJECTION, SOLUTION INTRAVENOUS at 09:31

## 2022-07-13 RX ADMIN — INSULIN LISPRO 4 UNITS: 100 INJECTION, SOLUTION INTRAVENOUS; SUBCUTANEOUS at 09:31

## 2022-07-13 RX ADMIN — Medication 10 ML: at 20:03

## 2022-07-13 RX ADMIN — Medication 10 ML: at 09:32

## 2022-07-13 NOTE — PROGRESS NOTES
[facility] Medicine Services  PROGRESS NOTE    Patient Name: Ivory Carr  : 1945  MRN: [id number]    Date of Admission: 2022  Primary Care Physician: Christiane Peter APRN    Subjective   Subjective     CC:  LE Edema    HPI:  Patient lying in bed with family at bedside. She states she is feeling ok, hungry, wants to know when she gets to go home. She understands her heart failure diagnosis and the need for further diuresis. No new complaints.    ROS:  Gen- No fevers, chills  CV- No chest pain, palpitations  Resp- No cough, dyspnea  GI- No N/V/D, abd pain, +abdominal distention and fullness    Objective   Objective     Vital Signs:   Temp:  [97.7 °F (36.5 °C)-98.1 °F (36.7 °C)] 98.1 °F (36.7 °C)  Heart Rate:  [64-78] 76  Resp:  [18] 18  BP: ()/(40-97) 127/63     Physical Exam:  Constitutional: No acute distress, awake, alert  HENT: NCAT, mucous membranes moist  Respiratory: Clear to auscultation bilaterally, respiratory effort normal   Cardiovascular:  Regular rate, irregularly irregular rhythm, +2/6 systolic murmur, rubs, or gallops  Gastrointestinal: Positive bowel sounds, soft, mild diffuse tenderness, moderately distended  Musculoskeletal: 1+ bilateral ankle edema  Psychiatric: Appropriate affect, cooperative  Neurologic: Oriented x 3, strength symmetric in all extremities, Cranial Nerves grossly intact to confrontation, speech clear  Skin: No rashes    Results Reviewed:  LAB RESULTS:      Lab 22  0454 22  0456 223 22  1648   WBC 4.39 4.25  --  5.05   HEMOGLOBIN 10.4* 10.8*  --  9.9*   HEMATOCRIT 33.4* 34.4  --  31.0*   PLATELETS 201 207  --  207   NEUTROS ABS 2.45 2.56  --  3.23   IMMATURE GRANS (ABS) 0.01 0.01  --  0.01   LYMPHS ABS 1.38 1.22  --  1.40   MONOS ABS 0.45 0.36  --  0.34   EOS ABS 0.08 0.08  --  0.05   MCV 85.4 84.9  --  84.9   PROCALCITONIN  --   --   --  0.11   LDH  --   --  283*  --    PROTIME  --   --   --  22.9*   D  DIMER QUANT  --   --   --  3.02*         Lab 07/13/22  0454 07/12/22  0457 07/12/22  0456 07/11/22 2123 07/11/22  1648   SODIUM 129* 134*  --   --  132*   POTASSIUM 4.7 3.8  --   --  4.2   CHLORIDE 90* 93*  --   --  93*   CO2 30.0* 32.0*  --   --  27.0   ANION GAP 9.0 9.0  --   --  12.0   BUN 29* 26*  --   --  28*   CREATININE 1.55* 1.50*  --   --  1.44*   EGFR 34.2* 35.5*  --   --  37.3*   GLUCOSE 295* 101*  --   --  237*   CALCIUM 9.1 9.0  --  8.8 9.6   MAGNESIUM 2.2 2.2  --   --  2.1   PHOSPHORUS 4.3 4.8*  --   --   --    HEMOGLOBIN A1C  --   --  10.60*  --   --    TSH  --   --   --  3.730  --          Lab 07/13/22  0454 07/12/22 0457 07/11/22  1648   TOTAL PROTEIN 6.7 7.1 7.0   ALBUMIN 3.70 3.90 3.70   GLOBULIN 3.0 3.2 3.3   ALT (SGPT) 11 14 13   AST (SGOT) 15 18 21   BILIRUBIN 0.4 0.6 0.4   ALK PHOS 63 65 64         Lab 07/12/22  1411 07/12/22 0457 07/11/22 2123 07/11/22  1648   PROBNP  --   --   --  1,114.0   TROPONIN T 0.049* 0.055* 0.044* 0.053*   PROTIME  --   --   --  22.9*   INR  --   --   --  2.03*         Lab 07/12/22  1411   CHOLESTEROL 148   LDL CHOL 98   HDL CHOL 35*   TRIGLYCERIDES 77         Lab 07/12/22 0457   IRON 33*   IRON SATURATION 6*   TIBC 554*   TRANSFERRIN 372*   FERRITIN 34.17         Brief Urine Lab Results  (Last result in the past 365 days)      Color   Clarity   Blood   Leuk Est   Nitrite   Protein   CREAT   Urine HCG        07/12/22 1217 Yellow   Clear   Negative   Small (1+)   Negative   Negative                 Microbiology Results Abnormal     Procedure Component Value - Date/Time    COVID PRE-OP / PRE-PROCEDURE SCREENING ORDER (NO ISOLATION) - Swab, Nasopharynx [[id number]]  (Normal) Collected: 07/11/22 2119    Lab Status: Final result Specimen: Swab from Nasopharynx Updated: 07/11/22 2245    Narrative:      The following orders were created for panel order COVID PRE-OP / PRE-PROCEDURE SCREENING ORDER (NO ISOLATION) - Swab, Nasopharynx.  Procedure                                Abnormality         Status                     ---------                               -----------         ------                     COVID-19 and FLU A/B PCR...[[id number]]  Normal              Final result                 Please view results for these tests on the individual orders.    COVID-19 and FLU A/B PCR - Swab, Nasopharynx [[id number]]  (Normal) Collected: 07/11/22 2119    Lab Status: Final result Specimen: Swab from Nasopharynx Updated: 07/11/22 2245     COVID19 Not Detected     Influenza A PCR Not Detected     Influenza B PCR Not Detected    Narrative:      Fact sheet for providers: https://www.fda.gov/media/356811/download    Fact sheet for patients: https://www.fda.gov/media/911004/download    Test performed by PCR.          Adult Transthoracic Echo Complete W/ Cont if Necessary Per Protocol    Result Date: 7/12/2022  · Estimated left ventricular EF = 65% Left ventricular ejection fraction appears to be 61 - 65%. Left ventricular systolic function is normal. · There is calcification of the aortic valve mainly affecting the right coronary cusp(s) consistent with moderate aortic valvular sclerosis. · Left ventricular wall thickness is consistent with mild concentric hypertrophy. · Mild to moderate mitral valve stenosis is present with functional MAC. · Moderate to severe tricuspid valve regurgitation is present. · Estimated right ventricular systolic pressure from tricuspid regurgitation is mildly elevated (35-45 mmHg). · Mild pulmonary hypertension is present. · Systolic and diastolic flattening of the interventricular septum consistent with right ventricle pressure and volume overload. · The right ventricular cavity is mildly dilated with normal right ventricular wall thickness and severely reduced right ventricular systolic function; abnormal septal motion. · There is no evidence of pericardial effusion.      CT Abdomen Pelvis With Contrast    Result Date: 7/11/2022  CT ABDOMEN PELVIS W CONTRAST-  Date  of Exam: 7/11/2022 8:59 PM  Indication: Abdominal pain, acute (Ped 0-17y).  Comparison: CT 10/07/2020  Technique: Contiguous axial CT images were obtained from the lung bases to the superior iliac crests with contrast.  Following uneventful administration of 100 cc of Isovue-370 intravenous and oral contrast, contiguous axial images obtained from lung bases to the pubic symphysis. Sagittal and coronal reconstructions were performed.  Automated exposure control and iterative reconstruction methods were used.  FINDINGS: Lower Thorax: Please see chest CT  Liver: Normal size and density. Surface nodularity is present. No focal lesions identified. Trace amount of ascites is present within the upper abdomen.  Gallbladder: The gallbladder appears normal in size with no evidence of radiopaque gallstones. The biliary tree is nondilated.  Pancreas: No focal masses.  No pancreatic duct dilation.  Spleen: Spleen is normal size.  No focal splenic lesions.  Adrenal glands: Unremarkable.  Kidneys: The kidneys appear normal in size. No evidence of nephrolithiasis. No evidence of hydronephrosis. No suspicious focal lesions identified.  Retroperitoneum/vascular: No retroperitoneal adenopathy identified. No aneurysmal dilatation of the vascular structures. Atherosclerotic calcifications are present.  Stomach and Bowel: No abnormally dilated loops of bowel no definite mass identified. No significant bowel wall thickening. No free fluid. No focal fluid collections identified. The appendix appears within normal limits. No mesenteric adenopathy identified. No evidence of free air.  Bladder: The bladder appears unremarkable.  Pelvic Organs: A fibroid uterus is present. A small amount of free fluid is present within the pelvis, improved as compared to the previous study. There is diastases of the rectus abdominal musculature. There is mild diffuse anasarca of the soft tissues.  Osseous structures: No aggressive focal lytic or sclerotic  osseous lesions. No acute osseous abnormality.      Impression:  1. Small amount of free fluid present within the pelvis, improved as compared to the previous study. There is anasarca the soft tissues with a trace amount of ascites present within the upper abdomen. Findings are likely related to cirrhosis and fluid overload. No acute intra-abdominal or pelvic process. 2. Ancillary findings as described above.  This report was finalized on 7/11/2022 9:17 PM by Lisa Beltre MD.      XR Chest 1 View    Result Date: 7/11/2022  Examination: XR CHEST 1 VW-  Date of Exam: 7/11/2022 5:47 PM  Indication: SOA triage protocol.  Comparison: Radiograph 10/06/2020  Technique: 1 view of the chest  Findings: There is a small right-sided pleural effusion. Mild atelectatic changes are present within the right lung base. Median sternotomy wires are present. No pneumothorax. The heart size is normal. The pulmonary vasculature appears within normal limits. No acute osseous abnormality identified.      Impression: Small right-sided pleural effusion with overlying atelectasis, similar as compared to the previous study from 10/06/2020.  This report was finalized on 7/11/2022 6:15 PM by Lisa Beltre MD.      CT Angiogram Chest    Result Date: 7/11/2022  DATE OF EXAM: 7/11/2022 8:59 PM  PROCEDURE: CT ANGIOGRAM CHEST-  INDICATIONS: pulmonary embolism rule out; I48.3-Typical atrial flutter; R60.9-Edema, unspecified; E87.70-Fluid overload, unspecified; I50.9-Heart failure, unspecified; R77.8-Other specified abnormalities of plasma proteins  COMPARISON: No comparisons available.  TECHNIQUE: Contiguous axial imaging was obtained from the thoracic inlet through the upper abdomen following the intravenous administration of 100 mL of Isovue 370. Reconstructed coronal and sagittal images were also obtained. Automated exposure control and iterative reconstruction methods were used.  The radiation dose reduction device was turned on for each scan per  the ALARA (As Low as Reasonably Achievable) protocol.  FINDINGS: There is no evidence of pulmonary embolism. The heart appears normal in size. A small to moderate-sized right-sided pleural effusion is present. Atherosclerotic calcifications are present. No focal consolidations. Mild scarring is present within the lung bases bilaterally. Median sternotomy wires are present. No pericardial effusion identified. No evidence of adenopathy. No acute osseous abnormality identified. Mild degenerative changes are present within the spine.       Impression:  1. No evidence of pulmonary embolism. 2. Small to moderate-sized right-sided pleural effusion likely related to fluid overload. 3. Ancillary findings as described above.  This report was finalized on 7/11/2022 9:19 PM by Lisa Beltre MD.        Results for orders placed during the hospital encounter of 07/11/22    Adult Transthoracic Echo Complete W/ Cont if Necessary Per Protocol    Interpretation Summary  · Estimated left ventricular EF = 65% Left ventricular ejection fraction appears to be 61 - 65%. Left ventricular systolic function is normal.  · There is calcification of the aortic valve mainly affecting the right coronary cusp(s) consistent with moderate aortic valvular sclerosis.  · Left ventricular wall thickness is consistent with mild concentric hypertrophy.  · Mild to moderate mitral valve stenosis is present with functional MAC.  · Moderate to severe tricuspid valve regurgitation is present.  · Estimated right ventricular systolic pressure from tricuspid regurgitation is mildly elevated (35-45 mmHg).  · Mild pulmonary hypertension is present.  · Systolic and diastolic flattening of the interventricular septum consistent with right ventricle pressure and volume overload.  · The right ventricular cavity is mildly dilated with normal right ventricular wall thickness and severely reduced right ventricular systolic function; abnormal septal motion.  · There is no  evidence of pericardial effusion.      I have reviewed the medications:  Scheduled Meds:bumetanide, 1 mg, Intravenous, Q12H  cefTRIAXone, 1 g, Intravenous, Q24H  ferric gluconate, 125 mg, Intravenous, Daily  insulin detemir, 10 Units, Subcutaneous, Daily  insulin lispro, 0-9 Units, Subcutaneous, TID AC  metoprolol tartrate, 12.5 mg, Oral, BID  rivaroxaban, 15 mg, Oral, Daily With Dinner  sodium chloride, 10 mL, Intravenous, Q12H      Continuous Infusions:   PRN Meds:.•  acetaminophen **OR** acetaminophen **OR** acetaminophen  •  albuterol  •  calcium carbonate  •  dextrose  •  dextrose  •  glucagon (human recombinant)  •  sodium chloride    Assessment & Plan   Assessment & Plan     Active Hospital Problems    Diagnosis  POA   • **Acute on chronic respiratory failure (HCC) [J96.20]  Unknown   • KIMMIE (acute kidney injury) (HCC) [N17.9]  Unknown   • Typical atrial flutter (HCC) [I48.3]  Yes   • Elevated troponin [R77.8]  Unknown   • Essential hypertension [I10]  Yes   • Diabetes (HCC) [E11.9]  Yes   • Anemia [D64.9]  Yes   • Stage 3a chronic kidney disease (HCC) [N18.31]  Yes   • Anasarca and ascites [R60.1]  Unknown      Resolved Hospital Problems   No resolved problems to display.        Brief Hospital Course to date:  Ivory Carr is a 76 y.o. female with past medical history of a flutter on Xarelto status post ablation 2020, diabetes type 2, CKD stage III, severe tricuspid regurgitation, cirrhosis, and hypertension who presented to the ED from primary care provider with increased bilateral lower extremity swelling as well as abdominal distention. CT in the ED showed anasarca likely related to cirrhosis. Patient admitted to the hospitalist for further workup.    This patient's problems and plans were partially entered by my partner and updated as appropriate by me 07/13/22.    Acute on Chronic Respiratory Failure- resolved  Elevated troponin- Type 2 troponin leak  Acute Diastolic Heart Failure  -Trop elevated on  admission, trended and stable  -Xray shows small right-sided pleural effusion, mild atelectatic changes in right lung base  -80mg IV lasix in ED  -SOA on exertion  -D dimer 3.02 on admission  -CTA chest negative for PE; Small to moderate-sized right-sided pleural effusion likely related to fluid overload seen.  -Albuterol nebs PRN   -hx of severe tricuspid regurg.   - 7/12/22 ECHO: EF of 65%, moderate aortic valvular sclerosis, MVS mild to moderate, moderate to severe tricuspid valve regurgitation, severely reduced right ventriclar systolic function  - cardiology following  - continue Bumex 1mg IV BID  - restart Xarelto     Anasarca d/t Congestive hepatopathy  -Previous paracentesis in 2020/minimal relief   -CT abd/pelvis: Small amount of free fluid present within the pelvis, improved as  compared to the previous study. There is anasarca the soft tissues with  a trace amount of ascites present within the upper abdomen. Findings are  likely related to cirrhosis and fluid overload.   - 7/12/22 ECHO: EF of 65%, moderate aortic valvular sclerosis, MVS mild to moderate, moderate to severe tricuspid valve regurgitation, severely reduced right ventriclar systolic function  - per GI, anasarca due to congestive hepatopathy with treatment being diuresis and treatment of underlying heart disease.  - Hepatitis panel negative  - PT/ INR: 22.9/2.03- patient on Xarelto-change from 20mg to renally dosed 15mg PO daily  - TIEN antimicrosomal antibodies, ferritin, hemochromatosis mutation, anti-smooth muscle antibody titer pending  - Ig G and IgA stable  - liver ultrasound this am     Aflutter s/p ablation 2020  -Controlled with metoprolol- decreased to 12.5mg PO BID per cardiology  -Continue Xarelto- change from 20mg to renally dosed 15mg PO daily  -Follows with Dr. Garcia     Dysuria  - urine culture pending  - start Rocephin 1gm IV Q24H     Hyponatremia  - corrected sodium due to hyperglycemia is 134   -monitor     Dizziness  -  likely multifactorial-- severe tricuspid regurg in the past/3rd spacing of fluid/anemic   -orthostatic q shift  - 7/12/22 ECHO: EF of 65%, moderate aortic valvular sclerosis, MVS mild to moderate, moderate to severe tricuspid valve regurgitation, severely reduced right ventriclar systolic function  -consult PT/OT  - cardiology following     Diabetes  -SSI  -ACHS finger sticks  - added Levemir 10units daily for better BG control  -HgA1C: 10.4     Acute on chronic kidney disease stage 3  -Cr 1.44 on admission, baseline 0.95-1.05  -Avoid nephrotoxic agents  -Strict I/Os  - consider nephrology consult if trends up tomorrow     HTN  -Hold lisinopril in setting of dizziness and KIMMIE      Chronic anemia  Iron deficiency anemia  -likely due to iron deficiency- iron level of 33. Replace IV daily x 3 days   -Hgb 9.9 on admission  -trend H&H     Previous history of hypothyroidism   - Patient states that she was previously on Synthroid and is concerned that she is no longer on Synthroid and request further evaluation of this is underlying cause of her edema.   - TSH stable at 3.73    Expected Discharge Location and Transportation: home via   Expected Discharge Date: 7/14/22    DVT prophylaxis:  Medical DVT prophylaxis orders are present.     AM-PAC 6 Clicks Score (PT): 17 (07/12/22 1762)    CODE STATUS:   Code Status and Medical Interventions:   Ordered at: 07/11/22 2447     Level Of Support Discussed With:    Patient     Code Status (Patient has no pulse and is not breathing):    CPR (Attempt to Resuscitate)     Medical Interventions (Patient has pulse or is breathing):    Full Support       Bernice Mckinley DO  07/13/22

## 2022-07-13 NOTE — CASE MANAGEMENT/SOCIAL WORK
Discharge Planning Assessment  [facility]     Patient Name: Ivory Carr  MRN: [id number]  Today's Date: 7/13/2022    Admit Date: 7/11/2022     Discharge Needs Assessment     Row Name 07/13/22 1557       Living Environment    People in Home spouse;child(mikel), adult;child(mikel), dependent;grandchild(mikel)    Current Living Arrangements home    Primary Care Provided by self    Provides Primary Care For no one    Family Caregiver if Needed child(mikel), adult;spouse;grandchild(mikel), adult    Able to Return to Prior Arrangements yes       Transition Planning    Patient/Family Anticipates Transition to home    Transportation Anticipated family or friend will provide       Discharge Needs Assessment    Readmission Within the Last 30 Days no previous admission in last 30 days    Equipment Currently Used at Home walker, rolling               Discharge Plan     Row Name 07/13/22 1558       Plan    Plan Home    Patient/Family in Agreement with Plan yes    Plan Comments I met with Mrs. Carr at the bedside today. She lives with her  her daughter and son in law and 13 kids in [facility]. She is independent with mobility and activities of daily living. She has a rolling walker at home that she uses sparingly. She does not drive but has a friend that is able to transport her when leaving the home. She is not current with any home or outpatient services. She denies any difficulty in obtaining or affording her medications. Therapy has recommended rehab and Mrs. Carr wanted to speak with her  about this but does think that she will not go. Case management will continue to follow.    Final Discharge Disposition Code 01 - home or self-care              Continued Care and Services - Admitted Since 7/11/2022    Coordination has not been started for this encounter.       Expected Discharge Date and Time     Expected Discharge Date Expected Discharge Time    Jul 15, 2022          Demographic Summary     Row Name  07/13/22 1558       General Information    General Information Comments I confirmed that Mrs. Carr's PCP is Christiane Peter and she has no insurance (self pay).               Functional Status     Row Name 07/13/22 7548       Functional Status, IADL    Medications independent    Meal Preparation independent    Housekeeping independent    Laundry independent    Shopping independent       Employment/    Employment Status retired               Psychosocial    No documentation.                Abuse/Neglect    No documentation.                Legal    No documentation.                Substance Abuse    No documentation.                Patient Forms    No documentation.                   Blair Monahan RN

## 2022-07-13 NOTE — THERAPY EVALUATION
Patient Name: Ivory Carr  : 1945    MRN: [id number]                              Today's Date: 2022       Admit Date: 2022    Visit Dx:     ICD-10-CM ICD-9-CM   1. Typical atrial flutter (HCC)  I48.3 427.32   2. Peripheral edema  R60.9 782.3   3. Hypervolemia, unspecified hypervolemia type  E87.70 276.69   4. Acute on chronic congestive heart failure, unspecified heart failure type (HCC)  I50.9 428.0   5. Elevated troponin  R77.8 790.6     Patient Active Problem List   Diagnosis   • Atrial flutter (HCC)   • Diabetes (HCC)   • Anasarca and ascites   • Anemia   • Stage 3a chronic kidney disease (HCC)   • Severe tricuspid regurgitation   • Essential hypertension   • ASD (atrial septal defect)   • Paroxysmal atrial fibrillation (HCC)   • Precordial pain   • Typical atrial flutter (HCC)   • Elevated troponin   • Acute on chronic respiratory failure (HCC)   • KIMMIE (acute kidney injury) (HCC)     Past Medical History:   Diagnosis Date   • ASD (atrial septal defect)    • Atrial flutter (HCC) 2020    Added automatically from request for surgery [id number]   • CKD (chronic kidney disease) 10/6/2020   • Diabetes (HCC)      Past Surgical History:   Procedure Laterality Date   • CARDIAC CATHETERIZATION N/A 10/9/2020    Procedure: Right Heart Cath;  Surgeon: Irvin Mccray MD;  Location:  [facility];  Service: Cardiology;  Laterality: N/A;   • CARDIAC ELECTROPHYSIOLOGY PROCEDURE N/A 10/15/2020    Procedure: Ablation atrial flutter;  Surgeon: Joseph Garcia DO;  Location:  [facility];  Service: Cardiovascular;  Laterality: N/A;   • CHOLECYSTECTOMY        General Information     Row Name 22 0954          OT Time and Intention    Document Type evaluation  -HK     Mode of Treatment occupational therapy  -HK     Row Name 22 0954          General Information    Patient Profile Reviewed yes  -HK     Prior Level of Function min assist:;all household  mobility;community mobility;gait;transfer;bed mobility;ADL's  uses rollator at baseline  -     Existing Precautions/Restrictions fall;orthostatic hypotension  -     Barriers to Rehab medically complex;previous functional deficit  -     Row Name 07/13/22 0954          Living Environment    People in Home spouse;other (see comments)  son and family next door  -     Row Name 07/13/22 0954          Home Main Entrance    Number of Stairs, Main Entrance none  -HK     Row Name 07/13/22 0954          Stairs Within Home, Primary    Stairs, Within Home, Primary ramp to enter  -HK     Number of Stairs, Within Home, Primary none  -HK     Stair Railings, Within Home, Primary none  -HK     Row Name 07/13/22 0954          Cognition    Orientation Status (Cognition) oriented x 4  -HK     Row Name 07/13/22 0954          Safety Issues, Functional Mobility    Safety Issues Affecting Function (Mobility) safety precautions follow-through/compliance;safety precaution awareness  -     Impairments Affecting Function (Mobility) balance;coordination;endurance/activity tolerance;pain;postural/trunk control;strength  -           User Key  (r) = Recorded By, (t) = Taken By, (c) = Cosigned By    Initials Name Provider Type     Bisi Pimentel, OT Occupational Therapist                 Mobility/ADL's     Row Name 07/13/22 0955          Bed Mobility    Bed Mobility scooting/bridging;supine-sit  -     Scooting/Bridging [place] (Bed Mobility) supervision  -     Supine-Sit [place] (Bed Mobility) supervision  -     Assistive Device (Bed Mobility) head of bed elevated  -     Row Name 07/13/22 0955          Transfers    Transfers sit-stand transfer  -     Sit-Stand [place] (Transfers) contact guard;verbal cues  -     Row Name 07/13/22 0955          Sit-Stand Transfer    Assistive Device (Sit-Stand Transfers) --  UE support  -     Comment, (Sit-Stand Transfer) Verbal cues for safety.  -     Row Name 07/13/22  0955          Functional Mobility    Functional Mobility- Ind. Level not tested  -[facility] Name 07/13/22 0955          Activities of Daily Living    BADL Assessment/Intervention lower body dressing;upper body dressing;feeding  -HK     Row Name 07/13/22 0955          Lower Body Dressing Assessment/Training    Comment, (Lower Body Dressing) Pt declined to complete stating her family assists with self care at home. Declines need for sock aid stating she has tried sock aids and is not intersted in any AE. Family at bedside comfirms they can assist pt at home.  -[facility] Name 07/13/22 0955          Upper Body Dressing Assessment/Training    [place] Level (Upper Body Dressing) don;front opening garment;minimum assist (75% patient effort)  -     Position (Upper Body Dressing) sitting up in bed  -HK     Row Name 07/13/22 0955          Self-Feeding Assessment/Training    [place] Level (Feeding) liquids to mouth;supervision  -     Position (Self-Feeding) sitting up in bed  -           User Key  (r) = Recorded By, (t) = Taken By, (c) = Cosigned By    Initials Name Provider Type     Bisi Pimentel, OT Occupational Therapist               Obj/Interventions     Row Name 07/13/22 0958          Sensory Assessment (Somatosensory)    Sensory Assessment (Somatosensory) sensation intact  -     Sensory Assessment denies all numbness and tingling.  -HK     Row Name 07/13/22 0958          Vision Assessment/Intervention    Visual Impairment/Limitations WFL  -HK     Row Name 07/13/22 0958          Range of Motion Comprehensive    General Range of Motion no range of motion deficits identified  -     Comment, General Range of Motion B UE WNL  -HK     Row Name 07/13/22 0958          Strength Comprehensive (MMT)    General Manual Muscle Testing (MMT) Assessment no strength deficits identified  -     Comment, General Manual Muscle Testing (MMT) Assessment B UE 5/5  -[facility] Name 07/13/22 0958          Motor Skills     Motor Skills functional endurance  -HK     Functional Endurance decreased  -     Row Name 07/13/22 0958          Balance    Balance Assessment sitting static balance;sitting dynamic balance;standing static balance;standing dynamic balance  -HK     Static Sitting Balance independent  -HK     Dynamic Sitting Balance supervision  -HK     Position, Sitting Balance sitting edge of bed;unsupported  -HK     Static Standing Balance contact guard  -HK     Dynamic Standing Balance contact guard  -HK     Position/Device Used, Standing Balance supported  UE support  -HK           User Key  (r) = Recorded By, (t) = Taken By, (c) = Cosigned By    Initials Name Provider Type     Bisi Pimentel, OT Occupational Therapist               Goals/Plan     Row Name 07/13/22 1003          Transfer Goal 1 (OT)    Activity/Assistive Device (Transfer Goal 1, OT) sit-to-stand/stand-to-sit;toilet  -HK     [place] Level/Cues Needed (Transfer Goal 1, OT) contact guard required;verbal cues required  -HK     Time Frame (Transfer Goal 1, OT) by discharge;long term goal (LTG)  -HK     Progress/Outcome (Transfer Goal 1, OT) goal ongoing  -     Row Name 07/13/22 1003          Toileting Goal 1 (OT)    Activity/Device (Toileting Goal 1, OT) adjust/manage clothing;perform perineal hygiene  -HK     [place] Level/Cues Needed (Toileting Goal 1, OT) minimum assist (75% or more patient effort);verbal cues required  -HK     Time Frame (Toileting Goal 1, OT) by discharge;long term goal (LTG)  -HK     Progress/Outcome (Toileting Goal 1, OT) goal ongoing  -     Row Name 07/13/22 1003          Grooming Goal 1 (OT)    Activity/Device (Grooming Goal 1, OT) hair care;oral care;wash face, hands  -HK     [place] (Grooming Goal 1, OT) minimum assist (75% or more patient effort);verbal cues required  -HK     Time Frame (Grooming Goal 1, OT) by discharge;long term goal (LTG)  -HK     Progress/Outcome (Grooming Goal 1, OT) goal ongoing  -     Row  Name 07/13/22 1003          Therapy Assessment/Plan (OT)    Planned Therapy Interventions (OT) adaptive equipment training;BADL retraining;functional balance retraining;ROM/therapeutic exercise;transfer/mobility retraining;occupation/activity based interventions  -           User Key  (r) = Recorded By, (t) = Taken By, (c) = Cosigned By    Initials Name Provider Type     Bisi Pimentel, OT Occupational Therapist               Clinical Impression     Row Name 07/13/22 1000          Pain Assessment    Pretreatment Pain Rating 0/10 - no pain  -HK     Posttreatment Pain Rating 0/10 - no pain  -HK     Row Name 07/13/22 1000          Plan of Care Review    Plan of Care Reviewed With patient  -HK     Progress improving  -     Outcome Evaluation OT eval complete. Pt is pleasent and cooperative. Advanced to EOB with supervision and transfers with CGA and UE support. Pt declined to attempt LBD stating at home her family assist her and she has no interest in AE. Has used AE in the past and reports she would prefer family assist to AE use. Family at bedside confirms they can assist pt at home. Pending improvement in functional mobility and dizziness. Recommend d/c home with family assist and  OT if agreeable.  -     Row Name 07/13/22 1000          Therapy Assessment/Plan (OT)    Patient/Family Therapy Goal Statement (OT) Pt would like to improve and return home.  -HK     Rehab Potential (OT) good, to achieve stated therapy goals  -     Criteria for Skilled Therapeutic Interventions Met (OT) yes;skilled treatment is necessary  -     Therapy Frequency (OT) daily  -     Row Name 07/13/22 1000          Therapy Plan Review/Discharge Plan (OT)    Anticipated Discharge Disposition (OT) home with assist;home with home health  -     Row Name 07/13/22 1000          Vital Signs    Pre Systolic BP Rehab --  RN cleared for tx; VSS; BP monitored throughout.  -HK     O2 Delivery Pre Treatment room air  -HK     O2 Delivery  Intra Treatment room air  -HK     O2 Delivery Post Treatment room air  -HK     Pre Patient Position Supine  -HK     Intra Patient Position Standing  -HK     Post Patient Position Supine  -HK     Row Name 07/13/22 1000          Positioning and Restraints    Pre-Treatment Position in bed  -HK     Post Treatment Position bed  -HK     In Bed notified nsg;supine;call light within reach;encouraged to call for assist;exit alarm on;with family/caregiver  -           User Key  (r) = Recorded By, (t) = Taken By, (c) = Cosigned By    Initials Name Provider Type    Bisi Chamorro OT Occupational Therapist               Outcome Measures     Row Name 07/13/22 1003          How much help from another is currently needed...    Putting on and taking off regular lower body clothing? 1  -HK     Bathing (including washing, rinsing, and drying) 3  -HK     Toileting (which includes using toilet bed pan or urinal) 3  -HK     Putting on and taking off regular upper body clothing 3  -HK     Taking care of personal grooming (such as brushing teeth) 3  -HK     Eating meals 3  -HK     AM-PAC 6 Clicks Score (OT) 16  -HK     Row Name 07/13/22 1003          Functional Assessment    Outcome Measure Options AM-PAC 6 Clicks Daily Activity (OT)  -HK           User Key  (r) = Recorded By, (t) = Taken By, (c) = Cosigned By    Initials Name Provider Type    Bisi Chamorro OT Occupational Therapist                Occupational Therapy Education                 Title: PT OT SLP Therapies (In Progress)     Topic: Occupational Therapy (In Progress)     Point: ADL training (Done)     Description:   Instruct learner(s) on proper safety adaptation and remediation techniques during self care or transfers.   Instruct in proper use of assistive devices.              Learning Progress Summary           Patient Acceptance, E,TB,D, VU,NR by  at 7/13/2022 1003                   Point: Home exercise program (Not Started)     Description:   Instruct learner(s)  on appropriate technique for monitoring, assisting and/or progressing therapeutic exercises/activities.              Learner Progress:  Not documented in this visit.          Point: Precautions (Done)     Description:   Instruct learner(s) on prescribed precautions during self-care and functional transfers.              Learning Progress Summary           Patient Acceptance, E,TB,D, VU,NR by  at 7/13/2022 1003                   Point: Body mechanics (Done)     Description:   Instruct learner(s) on proper positioning and spine alignment during self-care, functional mobility activities and/or exercises.              Learning Progress Summary           Patient Acceptance, E,TB,D, VU,NR by  at 7/13/2022 1003                               User Key     Initials Effective Dates Name Provider Type Discipline     06/16/21 -  Bisi Pimentel, OT Occupational Therapist OT              OT Recommendation and Plan  Planned Therapy Interventions (OT): adaptive equipment training, BADL retraining, functional balance retraining, ROM/therapeutic exercise, transfer/mobility retraining, occupation/activity based interventions  Therapy Frequency (OT): daily  Plan of Care Review  Plan of Care Reviewed With: patient  Progress: improving  Outcome Evaluation: OT eval complete. Pt is pleasent and cooperative. Advanced to EOB with supervision and transfers with CGA and UE support. Pt declined to attempt LBD stating at home her family assist her and she has no interest in AE. Has used AE in the past and reports she would prefer family assist to AE use. Family at bedside confirms they can assist pt at home. Pending improvement in functional mobility and dizziness. Recommend d/c home with family assist and  OT if agreeable.     Time Calculation:    Time Calculation- OT     Row Name 07/13/22 0930             Time Calculation- OT    OT Start Time 0930  -      OT Received On 07/13/22  -      OT Goal Re-Cert Due Date 07/23/22  -               Untimed Charges    OT Eval/Re-eval Minutes 47  -HK              Total Minutes    Untimed Charges Total Minutes 47  -HK       Total Minutes 47  -HK            User Key  (r) = Recorded By, (t) = Taken By, (c) = Cosigned By    Initials Name Provider Type    Bisi Chamorro OT Occupational Therapist              Therapy Charges for Today     Code Description Service Date Service Provider Modifiers Qty    55348485161 HC OT EVAL LOW COMPLEXITY 4 7/13/2022 Bisi Pimentel OT GO 1               Bisi Pimentel OT  7/13/2022

## 2022-07-13 NOTE — PROGRESS NOTES
"Ivory Carr  [id number]  1945   LOS: 2 days   Patient Care Team:  Christiane Peter APRN as PCP - General (Nurse Practitioner)    Chief Complaint:  REMOTE ASD REPAIR / REMOTE ATRIAL FLUTTER / RIGHT HEART FAILURE / DEBILITY / KIMMIE / UNCONTROLLED T2 DM / ATRIAL FLUTTER    Subjective     Comfortable in bed this morning off supplemental oxygen therapy (oximetry 96%).  She is concerned because she is hungry and is having to wait for a \"liver scan\" this morning.  She additionally relates chronic insomnia and again did not sleep last night.  She denies cough, sputum production, pleurisy, hemoptysis, anginal type chest discomfort, headache, or focal motor-sensory changes.  She is concerned because she has persistent abdominal distention and lower extremity edema.  Good appetite.    Objective     Vital Sign Min/Max for last 24 hours  Temp  Min: 97.7 °F (36.5 °C)  Max: 97.7 °F (36.5 °C)   BP  Min: 80/40  Max: 149/97   Pulse  Min: 64  Max: 80   Resp  Min: 18  Max: 18   SpO2  Min: 91 %  Max: 98 %      Weight  Min: 86.5 kg (190 lb 9.6 oz)  Max: 89.8 kg (198 lb)         07/12/22  1050 07/13/22  0500   Weight: 89.8 kg (198 lb) 86.5 kg (190 lb 9.6 oz)         Intake/Output Summary (Last 24 hours) at 7/13/2022 0750  Last data filed at 7/13/2022 0415  Gross per 24 hour   Intake 340 ml   Output 3250 ml   Net -2910 ml       Physical Exam:     General Appearance:    Alert, cooperative, in no acute distress   Lungs:    Bibasilar crackles without egophony or dullness,respirations regular, even and unlabored    Heart:    Irregular and normal rate, variable S1 and S2, grade 2/6 systolic murmur, no gallop, no rub, no click   Abdomen:  Extremities:   Soft, non-tender, bowel sounds audible x4    2+ edema, normal range of motion   Pulses:   Pulses palpable and equal bilaterally      Results Review:   Results from last 7 days   Lab Units 07/13/22  0454 07/12/22  0457 07/11/22  2123 07/11/22  1648   SODIUM mmol/L 129* 134*  --  132* "   POTASSIUM mmol/L 4.7 3.8  --  4.2   CHLORIDE mmol/L 90* 93*  --  93*   CO2 mmol/L 30.0* 32.0*  --  27.0   BUN mg/dL 29* 26*  --  28*   CREATININE mg/dL 1.55* 1.50*  --  1.44*   GLUCOSE mg/dL 295* 101*  --  237*   CALCIUM mg/dL 9.1 9.0 8.8 9.6     Results from last 7 days   Lab Units 07/13/22  0454 07/12/22  0456 07/11/22  1648   WBC 10*3/mm3 4.39 4.25 5.05   HEMOGLOBIN g/dL 10.4* 10.8* 9.9*   HEMATOCRIT % 33.4* 34.4 31.0*   PLATELETS 10*3/mm3 201 207 207     Results from last 7 days   Lab Units 07/12/22  1411   CHOLESTEROL mg/dL 148   TRIGLYCERIDES mg/dL 77   HDL CHOL mg/dL 35*   LDL CHOL mg/dL 98     Results from last 7 days   Lab Units 07/12/22  0456   HEMOGLOBIN A1C % 10.60*     Results from last 7 days   Lab Units 07/12/22  1411 07/12/22  0457 07/11/22  2123   TROPONIN T ng/mL 0.049* 0.055* 0.044*     · TTE:    · Estimated left ventricular EF = 65% Left ventricular ejection fraction appears to be 61 - 65%. Left ventricular systolic function is normal.  · There is calcification of the aortic valve mainly affecting the right coronary cusp(s) consistent with moderate aortic valvular sclerosis.  · Left ventricular wall thickness is consistent with mild concentric hypertrophy.  · Mild to moderate mitral valve stenosis is present with functional MAC.  · Moderate to severe tricuspid valve regurgitation is present.  · Estimated right ventricular systolic pressure from tricuspid regurgitation is mildly elevated (35-45 mmHg).  · Mild pulmonary hypertension is present.  · Systolic and diastolic flattening of the interventricular septum consistent with right ventricle pressure and volume overload.  · The right ventricular cavity is mildly dilated with normal right ventricular wall thickness and severely reduced right ventricular systolic function; abnormal septal motion.  · There is no evidence of pericardial effusion.    · EKG:    Atrial fibrillation  Anterolateral infarct (cited on or before 06-OCT-2020)  Prolonged  QT  Abnormal ECG  When compared with ECG of 12-JUL-2022 04:35, (Unconfirmed)  Atrial fibrillation has replaced Sinus rhythm  ST no longer depressed in Inferior leads    · NO CXR.    · ACCU-CHEKS: 289-295 mg/DL.    · ALBUMIN: 3.70 g/DL.    · MAGNESIUM: 2.2    Medication Review: REVIEWED; NO DRIPS.    Assessment & Plan     Would assess spot serum sodium and if greater than 20 would give a single dose of tolvaptan 15 mg x 1.  Would defer ECV at this time.  Would continue additional cardiac medications and reassess GFR in a.m.  Prognosis guarded in view of end-stage right sided heart failure.  We will order PT consult for bilateral lower extremity wraps to mobilize lower extremity fluid.    Discussed with patient and family member in room; the latter is eager for discharge home whenever possible.      Acute on chronic respiratory failure (HCC)    Diabetes ([facility])    Anasarca and ascites    Anemia    Stage 3a chronic kidney disease (HCC)    Essential hypertension    Typical atrial flutter (HCC)    Elevated troponin    KIMMIE (acute kidney injury) ([facility])    07/13/22  07:50 EDT

## 2022-07-13 NOTE — PLAN OF CARE
Goal Outcome Evaluation:  Plan of Care Reviewed With: patient        Progress: improving  Outcome Evaluation: OT eval complete. Pt is pleasent and cooperative. Advanced to EOB with supervision and transfers with CGA and UE support. Pt declined to attempt LBD stating at home her family assist her and she has no interest in AE. Has used AE in the past and reports she would prefer family assist to AE use. Family at bedside confirms they can assist pt at home. Pending improvement in functional mobility and dizziness. Recommend d/c home with family assist and HH OT if agreeable.

## 2022-07-14 LAB
ALBUMIN SERPL-MCNC: 3.6 G/DL (ref 3.5–5.2)
ALBUMIN/GLOB SERPL: 1.1 G/DL
ALP SERPL-CCNC: 60 U/L (ref 39–117)
ALT SERPL W P-5'-P-CCNC: 11 U/L (ref 1–33)
ANION GAP SERPL CALCULATED.3IONS-SCNC: 9 MMOL/L (ref 5–15)
AST SERPL-CCNC: 16 U/L (ref 1–32)
BACTERIA SPEC AEROBE CULT: ABNORMAL
BACTERIA SPEC AEROBE CULT: ABNORMAL
BILIRUB SERPL-MCNC: 0.4 MG/DL (ref 0–1.2)
BUN SERPL-MCNC: 27 MG/DL (ref 8–23)
BUN/CREAT SERPL: 17.6 (ref 7–25)
CALCIUM SPEC-SCNC: 9.3 MG/DL (ref 8.6–10.5)
CHLORIDE SERPL-SCNC: 93 MMOL/L (ref 98–107)
CO2 SERPL-SCNC: 29 MMOL/L (ref 22–29)
CREAT SERPL-MCNC: 1.53 MG/DL (ref 0.57–1)
DEPRECATED RDW RBC AUTO: 46.9 FL (ref 37–54)
EGFRCR SERPLBLD CKD-EPI 2021: 34.7 ML/MIN/1.73
ERYTHROCYTE [DISTWIDTH] IN BLOOD BY AUTOMATED COUNT: 15.2 % (ref 12.3–15.4)
GLOBULIN UR ELPH-MCNC: 3.3 GM/DL
GLUCOSE BLDC GLUCOMTR-MCNC: 210 MG/DL (ref 70–130)
GLUCOSE BLDC GLUCOMTR-MCNC: 263 MG/DL (ref 70–130)
GLUCOSE BLDC GLUCOMTR-MCNC: 289 MG/DL (ref 70–130)
GLUCOSE BLDC GLUCOMTR-MCNC: 296 MG/DL (ref 70–130)
GLUCOSE SERPL-MCNC: 201 MG/DL (ref 65–99)
HCT VFR BLD AUTO: 34.5 % (ref 34–46.6)
HGB BLD-MCNC: 10.6 G/DL (ref 12–15.9)
MCH RBC QN AUTO: 26.2 PG (ref 26.6–33)
MCHC RBC AUTO-ENTMCNC: 30.7 G/DL (ref 31.5–35.7)
MCV RBC AUTO: 85.2 FL (ref 79–97)
PHOSPHATE SERPL-MCNC: 3.5 MG/DL (ref 2.5–4.5)
PLATELET # BLD AUTO: 217 10*3/MM3 (ref 140–450)
PMV BLD AUTO: 10.8 FL (ref 6–12)
POTASSIUM SERPL-SCNC: 4.2 MMOL/L (ref 3.5–5.2)
PROT SERPL-MCNC: 6.9 G/DL (ref 6–8.5)
QT INTERVAL: 420 MS
QT INTERVAL: 464 MS
QTC INTERVAL: 459 MS
QTC INTERVAL: 515 MS
RBC # BLD AUTO: 4.05 10*6/MM3 (ref 3.77–5.28)
SODIUM SERPL-SCNC: 131 MMOL/L (ref 136–145)
WBC NRBC COR # BLD: 4.91 10*3/MM3 (ref 3.4–10.8)

## 2022-07-14 PROCEDURE — 63710000001 INSULIN DETEMIR PER 5 UNITS: Performed by: HOSPITALIST

## 2022-07-14 PROCEDURE — 25010000002 NA FERRIC GLUC CPLX PER 12.5 MG: Performed by: HOSPITALIST

## 2022-07-14 PROCEDURE — 80053 COMPREHEN METABOLIC PANEL: CPT | Performed by: HOSPITALIST

## 2022-07-14 PROCEDURE — 63710000001 INSULIN LISPRO (HUMAN) PER 5 UNITS: Performed by: HOSPITALIST

## 2022-07-14 PROCEDURE — 99232 SBSQ HOSP IP/OBS MODERATE 35: CPT | Performed by: INTERNAL MEDICINE

## 2022-07-14 PROCEDURE — 82962 GLUCOSE BLOOD TEST: CPT

## 2022-07-14 PROCEDURE — 29581 APPL MULTLAYER CMPRN SYS LEG: CPT

## 2022-07-14 PROCEDURE — 85027 COMPLETE CBC AUTOMATED: CPT | Performed by: HOSPITALIST

## 2022-07-14 PROCEDURE — 97162 PT EVAL MOD COMPLEX 30 MIN: CPT

## 2022-07-14 PROCEDURE — 84100 ASSAY OF PHOSPHORUS: CPT | Performed by: INTERNAL MEDICINE

## 2022-07-14 PROCEDURE — 99233 SBSQ HOSP IP/OBS HIGH 50: CPT | Performed by: HOSPITALIST

## 2022-07-14 RX ORDER — COLCHICINE 0.6 MG/1
0.6 TABLET ORAL DAILY
Status: DISCONTINUED | OUTPATIENT
Start: 2022-07-14 | End: 2022-07-15 | Stop reason: HOSPADM

## 2022-07-14 RX ORDER — BUMETANIDE 1 MG/1
1 TABLET ORAL
Status: DISCONTINUED | OUTPATIENT
Start: 2022-07-14 | End: 2022-07-15 | Stop reason: HOSPADM

## 2022-07-14 RX ORDER — TEMAZEPAM 15 MG/1
15 CAPSULE ORAL NIGHTLY PRN
Status: DISCONTINUED | OUTPATIENT
Start: 2022-07-14 | End: 2022-07-15 | Stop reason: HOSPADM

## 2022-07-14 RX ORDER — GRANULES FOR ORAL 3 G/1
3 POWDER ORAL ONCE
Status: COMPLETED | OUTPATIENT
Start: 2022-07-14 | End: 2022-07-14

## 2022-07-14 RX ORDER — NITROFURANTOIN 25; 75 MG/1; MG/1
100 CAPSULE ORAL EVERY 12 HOURS SCHEDULED
Status: DISCONTINUED | OUTPATIENT
Start: 2022-07-14 | End: 2022-07-14

## 2022-07-14 RX ADMIN — COLCHICINE 0.6 MG: 0.6 TABLET, FILM COATED ORAL at 08:37

## 2022-07-14 RX ADMIN — METOPROLOL TARTRATE 12.5 MG: 25 TABLET, FILM COATED ORAL at 21:15

## 2022-07-14 RX ADMIN — BUMETANIDE 1 MG: 1 TABLET ORAL at 08:37

## 2022-07-14 RX ADMIN — INSULIN LISPRO 6 UNITS: 100 INJECTION, SOLUTION INTRAVENOUS; SUBCUTANEOUS at 11:46

## 2022-07-14 RX ADMIN — GRANULES FOR ORAL SOLUTION 3 G: 3 POWDER ORAL at 12:46

## 2022-07-14 RX ADMIN — INSULIN LISPRO 6 UNITS: 100 INJECTION, SOLUTION INTRAVENOUS; SUBCUTANEOUS at 16:55

## 2022-07-14 RX ADMIN — METOPROLOL TARTRATE 12.5 MG: 25 TABLET, FILM COATED ORAL at 08:37

## 2022-07-14 RX ADMIN — INSULIN LISPRO 4 UNITS: 100 INJECTION, SOLUTION INTRAVENOUS; SUBCUTANEOUS at 08:36

## 2022-07-14 RX ADMIN — BUMETANIDE 1 MG: 1 TABLET ORAL at 17:00

## 2022-07-14 RX ADMIN — INSULIN DETEMIR 12 UNITS: 100 INJECTION, SOLUTION SUBCUTANEOUS at 08:35

## 2022-07-14 RX ADMIN — TEMAZEPAM 15 MG: 15 CAPSULE ORAL at 21:15

## 2022-07-14 RX ADMIN — Medication 10 ML: at 21:15

## 2022-07-14 RX ADMIN — SODIUM CHLORIDE 125 MG: 9 INJECTION, SOLUTION INTRAVENOUS at 11:47

## 2022-07-14 RX ADMIN — RIVAROXABAN 15 MG: 15 TABLET, FILM COATED ORAL at 17:00

## 2022-07-14 RX ADMIN — Medication 10 ML: at 08:40

## 2022-07-14 NOTE — PROGRESS NOTES
"Ivory Carr  [id number]  1945   LOS: 3 days   Patient Care Team:  Christiane Peter APRN as PCP - General (Nurse Practitioner)    Chief Complaint:  REMOTE ASD REPAIR / REMOTE ATRIAL FLUTTER / RIGHT HEART FAILURE / DEBILITY / KIMMIE / UNCONTROLLED T2 DM / ATRIAL FIBRILLATION / PA HTN    Subjective     Comfortable sitting upright in bed off supplemental oxygen therapy (room air oximetry 96%).  She states \"I may be a little bit better in terms of my breathing and my swelling.\"  She denies anginal type chest discomfort, increased tachypnea/dyspnea, nausea, emesis, abdominal pain, headache, or focal motor-sensory changes.  She still has significant complaints of chronic insomnia.  Acceptable appetite.  She is concerned she may have ankle cellulitis.    Objective     Vital Sign Min/Max for last 24 hours  Temp  Min: 98.1 °F (36.7 °C)  Max: 98.3 °F (36.8 °C)   BP  Min: 122/66  Max: 146/72   Pulse  Min: 67  Max: 85   Resp  Min: 17  Max: 18   SpO2  Min: 92 %  Max: 98 %      Weight  Min: 86.6 kg (190 lb 14.4 oz)  Max: 86.6 kg (190 lb 14.4 oz)         07/13/22  0500 07/14/22  0600   Weight: 86.5 kg (190 lb 9.6 oz) 86.6 kg (190 lb 14.4 oz)         Intake/Output Summary (Last 24 hours) at 7/14/2022 0745  Last data filed at 7/14/2022 0346  Gross per 24 hour   Intake 100 ml   Output 2700 ml   Net -2600 ml       Physical Exam:     General Appearance:    Alert, cooperative, in no acute distress   Lungs:    Few left basilar crackles without egophony or dullness,respirations regular, even and  unlabored    Heart:    Irregular and normal rate, variable S1 and S2, grade 2/6 systolic murmur, no gallop, no rub, no click   Abdomen:  Extremities:   Soft, non-tender, bowel sounds audible x4    1+ edema, normal range of motion   Pulses:   Pulses palpable and equal bilaterally      Results Review:   Results from last 7 days   Lab Units 07/13/22  0953 07/13/22  0454 07/12/22  0457 07/11/22  2123 07/11/22  1648   SODIUM mmol/L 132* 129* 134*  " --  132*   POTASSIUM mmol/L  --  4.7 3.8  --  4.2   CHLORIDE mmol/L  --  90* 93*  --  93*   CO2 mmol/L  --  30.0* 32.0*  --  27.0   BUN mg/dL  --  29* 26*  --  28*   CREATININE mg/dL  --  1.55* 1.50*  --  1.44*   GLUCOSE mg/dL  --  295* 101*  --  237*   CALCIUM mg/dL  --  9.1 9.0 8.8 9.6     Results from last 7 days   Lab Units 07/13/22  0454 07/12/22  0456 07/11/22  1648   WBC 10*3/mm3 4.39 4.25 5.05   HEMOGLOBIN g/dL 10.4* 10.8* 9.9*   HEMATOCRIT % 33.4* 34.4 31.0*   PLATELETS 10*3/mm3 201 207 207     Results from last 7 days   Lab Units 07/12/22  1411   CHOLESTEROL mg/dL 148   TRIGLYCERIDES mg/dL 77   HDL CHOL mg/dL 35*   LDL CHOL mg/dL 98     Results from last 7 days   Lab Units 07/12/22  0456   HEMOGLOBIN A1C % 10.60*     Results from last 7 days   Lab Units 07/12/22  1411 07/12/22  0457 07/11/22  2123   TROPONIN T ng/mL 0.049* 0.055* 0.044*     · NO NEW CXR / EKG / LAB RESULTS.    · ACCU-CHEKS: 242-269-210 mg/DL.    · LIVER US:    FINDINGS:  Visualized portions of the head and body of the pancreas are normal.  Evaluation is limited due to surrounding bowel gas.     The liver appears echogenic and heterogeneous in echotexture. Surface  nodularity is present. No evidence of ascites..  No focal hepatic  lesions.  Portal and hepatic veins are patent and have normal flow  direction and waveforms.      The gallbladder has been resected. The biliary system is nondilated.      The right kidney is normal in size with no evidence of hydronephrosis.  No suspicious focal lesions.      IMPRESSION:  No acute sonographic abnormality within the right upper quadrant status  post cholecystectomy. There is evidence of early cirrhosis with  heterogeneous nodular liver parenchyma. No focal lesions or ascites.    Medication Review: REVIEWED; NO DRIPS.    Assessment & Plan     Acceptable diuresis with no new laboratory study results to review.  Pleased corrected sodium was only low and did not require treatment.  Would continue  current dose of diuretic therapy if there is no progressive azotemia at this time.  Hopefully home soon with close outpatient follow-up and monitoring with [facility]F clinic and return to Dr. Garcia in terms of long-term therapy for atrial tachyarrhythmias.  Would not pursue ECV at this time without her being on formal antiarrhythmic therapy in view of marked atrial myopathy.    Discussed with patient and family member in room.      Acute on chronic respiratory failure (HCC)    Diabetes (HCC)    Anasarca and ascites    Anemia    Stage 3a chronic kidney disease (HCC)    Essential hypertension    Typical atrial flutter (HCC)    Elevated troponin    KIMMIE (acute kidney injury) (HCC)        07/14/22  07:45 EDT

## 2022-07-14 NOTE — PLAN OF CARE
Problem: Adult Inpatient Plan of Care  Goal: Plan of Care Review  Outcome: Ongoing, Progressing  Goal: Patient-Specific Goal (Individualized)  Outcome: Ongoing, Progressing  Goal: Absence of Hospital-Acquired Illness or Injury  Outcome: Ongoing, Progressing  Intervention: Identify and Manage Fall Risk  Recent Flowsheet Documentation  Taken 7/14/2022 1600 by Cleo Sanders, RN  Safety Promotion/Fall Prevention:   activity supervised   assistive device/personal items within reach   clutter free environment maintained   fall prevention program maintained   nonskid shoes/slippers when out of bed   room organization consistent   safety round/check completed  Taken 7/14/2022 1400 by Cleo Sanders, RN  Safety Promotion/Fall Prevention:   activity supervised   assistive device/personal items within reach   clutter free environment maintained   fall prevention program maintained   nonskid shoes/slippers when out of bed   room organization consistent   safety round/check completed  Taken 7/14/2022 1200 by Cleo Sanders, RN  Safety Promotion/Fall Prevention:   activity supervised   assistive device/personal items within reach   clutter free environment maintained   fall prevention program maintained   nonskid shoes/slippers when out of bed   room organization consistent   safety round/check completed  Taken 7/14/2022 1000 by Cleo Sanders, RN  Safety Promotion/Fall Prevention:   activity supervised   assistive device/personal items within reach   clutter free environment maintained   fall prevention program maintained   nonskid shoes/slippers when out of bed   room organization consistent   safety round/check completed  Taken 7/14/2022 0800 by Cleo Sanders, RN  Safety Promotion/Fall Prevention:   activity supervised   assistive device/personal items within reach   clutter free environment maintained   fall prevention program maintained   nonskid shoes/slippers when out of bed   room organization consistent   safety  round/check completed  Intervention: Prevent Skin Injury  Recent Flowsheet Documentation  Taken 7/14/2022 1600 by Cleo Sanders RN  Body Position: sitting up in bed  Taken 7/14/2022 1400 by Cleo Sanders RN  Body Position: position changed independently  Taken 7/14/2022 1200 by Cleo Sanders RN  Body Position: position changed independently  Taken 7/14/2022 1000 by Cleo Sanders RN  Body Position: position changed independently  Taken 7/14/2022 0800 by Cleo Sanders RN  Body Position: sitting up in bed  Intervention: Prevent and Manage VTE (Venous Thromboembolism) Risk  Recent Flowsheet Documentation  Taken 7/14/2022 1600 by Cleo Sanders RN  Activity Management: activity adjusted per tolerance  Taken 7/14/2022 1400 by Cleo Sanders RN  Activity Management: activity adjusted per tolerance  Taken 7/14/2022 1200 by Cleo Sanders RN  Activity Management: activity adjusted per tolerance  Taken 7/14/2022 1000 by Cleo Sanders RN  Activity Management:   activity adjusted per tolerance   ambulated in room  Taken 7/14/2022 0800 by Cleo Sanders RN  Activity Management:   sitting, edge of bed   activity adjusted per tolerance  VTE Prevention/Management:   bilateral   dorsiflexion/plantar flexion performed   bleeding risk factor(s) identified  Intervention: Prevent Infection  Recent Flowsheet Documentation  Taken 7/14/2022 1600 by Cleo Sanders RN  Infection Prevention:   environmental surveillance performed   equipment surfaces disinfected   hand hygiene promoted   personal protective equipment utilized   rest/sleep promoted   single patient room provided   visitors restricted/screened  Taken 7/14/2022 1400 by Cleo Sanders RN  Infection Prevention:   environmental surveillance performed   equipment surfaces disinfected   hand hygiene promoted   personal protective equipment utilized   rest/sleep promoted  Taken 7/14/2022 1200 by Cleo Sanders RN  Infection Prevention:   environmental  surveillance performed   equipment surfaces disinfected   hand hygiene promoted   personal protective equipment utilized   rest/sleep promoted  Taken 7/14/2022 1000 by Cleo Sanders RN  Infection Prevention:   environmental surveillance performed   equipment surfaces disinfected   hand hygiene promoted   personal protective equipment utilized   rest/sleep promoted   single patient room provided   visitors restricted/screened  Taken 7/14/2022 0800 by Cleo Sanders, RN  Infection Prevention:   environmental surveillance performed   equipment surfaces disinfected   hand hygiene promoted   personal protective equipment utilized   rest/sleep promoted  Goal: Optimal Comfort and Wellbeing  Outcome: Ongoing, Progressing  Goal: Readiness for Transition of Care  Outcome: Ongoing, Progressing   Goal Outcome Evaluation:

## 2022-07-14 NOTE — THERAPY WOUND CARE TREATMENT
Acute Care - Wound/Debridement Initial Evaluation  [facility]     Patient Name: Ivory Carr  : 1945  MRN: [id number]  Today's Date: 2022                Admit Date: 2022    Visit Dx:    ICD-10-CM ICD-9-CM   1. Typical atrial flutter (HCC)  I48.3 427.32   2. Peripheral edema  R60.9 782.3   3. Hypervolemia, unspecified hypervolemia type  E87.70 276.69   4. Acute on chronic congestive heart failure, unspecified heart failure type (HCC)  I50.9 428.0   5. Elevated troponin  R77.8 790.6       Patient Active Problem List   Diagnosis   • Atrial flutter (HCC)   • Diabetes (HCC)   • Anasarca and ascites   • Anemia   • Stage 3a chronic kidney disease (HCC)   • Severe tricuspid regurgitation   • Essential hypertension   • ASD (atrial septal defect)   • Paroxysmal atrial fibrillation (HCC)   • Precordial pain   • Typical atrial flutter (HCC)   • Elevated troponin   • Acute on chronic respiratory failure (HCC)   • KIMMIE (acute kidney injury) (HCC)        Past Medical History:   Diagnosis Date   • ASD (atrial septal defect)    • Atrial flutter (HCC) 2020    Added automatically from request for surgery [id number]   • CKD (chronic kidney disease) 10/6/2020   • Diabetes (HCC)         Past Surgical History:   Procedure Laterality Date   • CARDIAC CATHETERIZATION N/A 10/9/2020    Procedure: Right Heart Cath;  Surgeon: Irvin Mccray MD;  Location:  [facility];  Service: Cardiology;  Laterality: N/A;   • CARDIAC ELECTROPHYSIOLOGY PROCEDURE N/A 10/15/2020    Procedure: Ablation atrial flutter;  Surgeon: Joseph Garcia DO;  Location:  [facility] EP INVASIVE [facility];  Service: Cardiovascular;  Laterality: N/A;   • CHOLECYSTECTOMY                 Lymphedema     Row Name 22 1015             Lymphedema Edema Assessment    Ptting Edema Category By severity  -KW      Pitting Edema Moderate  -KW              Skin Changes/Observations    Location/Assessment Lower Extremity  -KW      Lower Extremity  Conditions bilateral:;intact;clean;dry  -KW      Lower Extremity Color/Pigment bilateral:;normal  -KW              Lymphedema Pulses/Capillary Refill    Lymphedema Pulses/Capillary Refill capillary refill  -KW      Capillary Refill lower extremity capillary refill  -KW      Lower Extremity Capillary Refill right:;left:;less than 3 seconds  -KW              Lymphedema Measurements    Measurement Type(s) Quick Girth  -KW      Quick Girth Areas Lower extremities  -KW              LLE Quick Girth (cm)    Met-heads 25.6 cm  -KW      Mid foot 25.9 cm  -KW      Smallest ankle 23 cm  -KW      Largest calf 41.5 cm  -KW      Tib tuberosity 38 cm  -KW              RLE Quick Girth (cm)    Met-heads 24.9 cm  -KW      Mid foot 25.2 cm  -KW      Smallest ankle 38.9 cm  -KW      Largest calf 37.2 cm  -KW      RLE Quick Girth Total 126.2  -KW              Compression/Skin Care    Compression/Skin Care skin care;wrapping location  -KW      Skin Care washed/dried;lotion applied  -KW      Wrapping Location lower extremity  -KW      Wrapping Location LE bilateral:;foot to knee  -KW      Wrapping Comments compressiogrip sizes 4/5 doubled and overlapping for gradient compression  -KW            User Key  (r) = Recorded By, (t) = Taken By, (c) = Cosigned By    Initials Name Provider Type    Ivy Flaherty, PT Physical Therapist                WOUND DEBRIDEMENT                     PT Assessment (last 12 hours)     PT Evaluation and Treatment     Row Name 07/14/22 1015          Physical Therapy Time and Intention    Subjective Information no complaints  -KW     Document Type evaluation;wound care  -KW     Mode of Treatment physical therapy;individual therapy  -KW     Row Name 07/14/22 1015          General Information    Patient Profile Reviewed yes  -KW     Row Name 07/14/22 1015          Pain    Pretreatment Pain Rating 0/10 - no pain  -KW     Row Name 07/14/22 1015          Plan of Care Review    Plan of Care Reviewed With  patient  -KW     Progress no change  -KW     Outcome Evaluation Patient with moderate edema in B LE. She would benefit from skilled PT services and MLW to manage edema  -KW     Row Name 07/14/22 1015          Positioning and Restraints    Pre-Treatment Position in bed  -KW     Post Treatment Position bed  -KW     In Bed sitting EOB;call light within reach;encouraged to call for assist  -KW     Row Name 07/14/22 1015          PT Evaluation Complexity    History, PT Evaluation Complexity 3 or more personal factors and/or comorbidities  -KW     Examination of Body Systems (PT Eval Complexity) 1-2 elements  -KW     Clinical Presentation (PT Evaluation Complexity) evolving  -KW     Clinical Decision Making (PT Evaluation Complexity) moderate complexity  -KW     Overall Complexity (PT Evaluation Complexity) low complexity  -KW     Row Name 07/14/22 1015          Physical Therapy Goals    Wound Care Goal Selection (PT) wound care, PT goal 1;wound care, PT goal 2  -KW     Row Name 07/14/22 1015          Wound Care Goal 1 (PT)    Wound Care Goal 1 (PT) Patient's  R fullest calf will measure < 31cm  -KW     Time Frame (Wound Care Goal 1, PT) 10 days  -KW     Row Name 07/14/22 1015          Wound Care Goal 2 (PT)    Wound Care Goal 2 (PT) Patient's L fullest calf will measure <31cm.  -KW     Time Frame (Wound Care Goal 2, PT) 10 days  -KW           User Key  (r) = Recorded By, (t) = Taken By, (c) = Cosigned By    Initials Name Provider Type    Ivy Flaherty, PT Physical Therapist              Physical Therapy Education                 Title: PT OT SLP Therapies (In Progress)     Topic: Physical Therapy (Done)     Point: Mobility training (Done)     Learning Progress Summary           Patient Acceptance, E,TB, VU by LOPEZ at 7/14/2022 1015    Comment: Patient educated about benefit of MLW and elevating legs throughout the day    Acceptance, E, VU,NR by BA at 7/12/2022 1619                   Point: Home exercise program  (Done)     Learning Progress Summary           Patient Acceptance, E,TB, VU by  at 7/14/2022 1015    Comment: Patient educated about benefit of MLW and elevating legs throughout the day                   Point: Body mechanics (Done)     Learning Progress Summary           Patient Acceptance, E,TB, VU by KW at 7/14/2022 1015    Comment: Patient educated about benefit of MLW and elevating legs throughout the day    Acceptance, E, VU,NR by BA at 7/12/2022 1619                   Point: Precautions (Done)     Learning Progress Summary           Patient Acceptance, E,TB, VU by KW at 7/14/2022 1015    Comment: Patient educated about benefit of MLW and elevating legs throughout the day    Acceptance, E, VU,NR by BA at 7/12/2022 1619                               User Key     Initials Effective Dates Name Provider Type Discipline     09/21/21 -  Rosalind Aguilar, PT Physical Therapist PT     01/27/22 -  Ivy Mallory, PT Physical Therapist PT                Recommendation and Plan  Anticipated Discharge Disposition (PT): inpatient rehabilitation facility  Planned Therapy Interventions (PT): balance training, bed mobility training, gait training, home exercise program, motor coordination training, patient/family education, postural re-education, strengthening, transfer training  Therapy Frequency (PT): daily  Plan of Care Reviewed With: patient   Progress: no change       Progress: no change  Outcome Evaluation: Patient with moderate edema in B LE. She would benefit from skilled PT services and MLW to manage edema  Plan of Care Reviewed With: patient            Time Calculation   PT Charges     Row Name 07/14/22 1015             Time Calculation    Start Time 1015  -KW              Untimed Charges    PT Eval/Re-eval Minutes 25  -KW      [id number] comp below knee 20  -KW              Total Minutes    Untimed Charges Total Minutes 45  -KW       Total Minutes 45  -KW            User Key  (r) = Recorded By,  (t) = Taken By, (c) = Cosigned By    Initials Name Provider Type    Ivy Flaherty, SADE Physical Therapist                  Therapy Charges for Today     Code Description Service Date Service Provider Modifiers Qty    78508659129 HC PT MULTI LAYER COMP SYS BELOW KNEE 7/14/2022 Ivy Mallory, PT GP 1    46443685000 HC PT EVAL MOD COMPLEXITY 2 7/14/2022 Ivy Mallory PT GP 1            PT G-Codes  Outcome Measure Options: AM-PAC 6 Clicks Daily Activity (OT)  AM-PAC 6 Clicks Score (PT): 21  AM-PAC 6 Clicks Score (OT): 16       Ivy Mallory PT  7/14/2022

## 2022-07-14 NOTE — PROGRESS NOTES
[facility] Medicine Services  PROGRESS NOTE    Patient Name: Ivory Carr  : 1945  MRN: [id number]    Date of Admission: 2022  Primary Care Physician: Christiane Peter APRN    Subjective   Subjective     CC:  LE Edema    HPI:  Up in bed, eating breakfast. Notes B ankle pain. Worried about cellulitis. Pain in B ankles, with flexion/weight bearing.  Review of Systems   Constitutional: Positive for activity change and fatigue.   HENT: Negative.    Respiratory: Negative.    Cardiovascular: Negative.    Gastrointestinal: Negative.    Musculoskeletal: Positive for arthralgias and myalgias.   Skin: Negative.          Objective   Objective     Vital Signs:   Temp:  [97.7 °F (36.5 °C)-98.3 °F (36.8 °C)] 97.7 °F (36.5 °C)  Heart Rate:  [67-85] 77  Resp:  [17-19] 19  BP: (122-154)/() 154/82     Physical Exam:  NAD, alert and oriented  OP clear, MMM  Neck supple  No LAD  RRR  CTAB  +BS, soft  B ankle edema, ROM intact but painful and B TTP, no obvious redness and no increased warmth however  NULL  Normal affect  Family at bedside  Results Reviewed:  LAB RESULTS:      Lab 22  0704 22  0454 22  0456 22  2123 22  1648   WBC 4.91 4.39 4.25  --  5.05   HEMOGLOBIN 10.6* 10.4* 10.8*  --  9.9*   HEMATOCRIT 34.5 33.4* 34.4  --  31.0*   PLATELETS 217 201 207  --  207   NEUTROS ABS  --  2.45 2.56  --  3.23   IMMATURE GRANS (ABS)  --  0.01 0.01  --  0.01   LYMPHS ABS  --  1.38 1.22  --  1.40   MONOS ABS  --  0.45 0.36  --  0.34   EOS ABS  --  0.08 0.08  --  0.05   MCV 85.2 85.4 84.9  --  84.9   PROCALCITONIN  --   --   --   --  0.11   LDH  --   --   --  283*  --    PROTIME  --   --   --   --  22.9*   D DIMER QUANT  --   --   --   --  3.02*         Lab 22  0953 22  0454 22  1648   SODIUM 132* 129* 134*  --   --  132*   POTASSIUM  --  4.7 3.8  --   --  4.2   CHLORIDE  --  90* 93*  --   --  93*   CO2  --   30.0* 32.0*  --   --  27.0   ANION GAP  --  9.0 9.0  --   --  12.0   BUN  --  29* 26*  --   --  28*   CREATININE  --  1.55* 1.50*  --   --  1.44*   EGFR  --  34.2* 35.5*  --   --  37.3*   GLUCOSE  --  295* 101*  --   --  237*   CALCIUM  --  9.1 9.0  --  8.8 9.6   MAGNESIUM  --  2.2 2.2  --   --  2.1   PHOSPHORUS  --  4.3 4.8*  --   --   --    HEMOGLOBIN A1C  --   --   --  10.60*  --   --    TSH  --   --   --   --  3.730  --          Lab 07/13/22  0454 07/12/22  0457 07/11/22  1648   TOTAL PROTEIN 6.7 7.1 7.0   ALBUMIN 3.70 3.90 3.70   GLOBULIN 3.0 3.2 3.3   ALT (SGPT) 11 14 13   AST (SGOT) 15 18 21   BILIRUBIN 0.4 0.6 0.4   ALK PHOS 63 65 64         Lab 07/12/22  1411 07/12/22  0457 07/11/22  2123 07/11/22  1648   PROBNP  --   --   --  1,114.0   TROPONIN T 0.049* 0.055* 0.044* 0.053*   PROTIME  --   --   --  22.9*   INR  --   --   --  2.03*         Lab 07/12/22  1411   CHOLESTEROL 148   LDL CHOL 98   HDL CHOL 35*   TRIGLYCERIDES 77         Lab 07/12/22  0457   IRON 33*   IRON SATURATION 6*   TIBC 554*   TRANSFERRIN 372*   FERRITIN 34.17         Brief Urine Lab Results  (Last result in the past 365 days)      Color   Clarity   Blood   Leuk Est   Nitrite   Protein   CREAT   Urine HCG        07/12/22 1217 Yellow   Clear   Negative   Small (1+)   Negative   Negative                 Microbiology Results Abnormal     Procedure Component Value - Date/Time    COVID PRE-OP / PRE-PROCEDURE SCREENING ORDER (NO ISOLATION) - Swab, Nasopharynx [[id number]]  (Normal) Collected: 07/11/22 2119    Lab Status: Final result Specimen: Swab from Nasopharynx Updated: 07/11/22 2245    Narrative:      The following orders were created for panel order COVID PRE-OP / PRE-PROCEDURE SCREENING ORDER (NO ISOLATION) - Swab, Nasopharynx.  Procedure                               Abnormality         Status                     ---------                               -----------         ------                     COVID-19 and FLU A/B PCR...[[id number]]   Normal              Final result                 Please view results for these tests on the individual orders.    COVID-19 and FLU A/B PCR - Swab, Nasopharynx [[id number]]  (Normal) Collected: 07/11/22 2119    Lab Status: Final result Specimen: Swab from Nasopharynx Updated: 07/11/22 2245     COVID19 Not Detected     Influenza A PCR Not Detected     Influenza B PCR Not Detected    Narrative:      Fact sheet for providers: https://www.fda.gov/media/344610/download    Fact sheet for patients: https://www.fda.gov/media/617706/download    Test performed by PCR.          Adult Transthoracic Echo Complete W/ Cont if Necessary Per Protocol    Result Date: 7/12/2022  · Estimated left ventricular EF = 65% Left ventricular ejection fraction appears to be 61 - 65%. Left ventricular systolic function is normal. · There is calcification of the aortic valve mainly affecting the right coronary cusp(s) consistent with moderate aortic valvular sclerosis. · Left ventricular wall thickness is consistent with mild concentric hypertrophy. · Mild to moderate mitral valve stenosis is present with functional MAC. · Moderate to severe tricuspid valve regurgitation is present. · Estimated right ventricular systolic pressure from tricuspid regurgitation is mildly elevated (35-45 mmHg). · Mild pulmonary hypertension is present. · Systolic and diastolic flattening of the interventricular septum consistent with right ventricle pressure and volume overload. · The right ventricular cavity is mildly dilated with normal right ventricular wall thickness and severely reduced right ventricular systolic function; abnormal septal motion. · There is no evidence of pericardial effusion.      US Liver    Result Date: 7/13/2022   LIVER-  Date of Exam: 7/13/2022 10:30 AM  Indication: cirrhosis/ascites; I48.3-Typical atrial flutter; R60.9-Edema, unspecified; E87.70-Fluid overload, unspecified; I50.9-Heart failure, unspecified; R77.8-Other specified  abnormalities of plasma proteins.  Comparison: CT 07/11/2022  Technique: Transverse and sagittal ultrasound images of the right upper quadrant were obtained. Doppler evaluation was also conducted.  FINDINGS: Visualized portions of the head and body of the pancreas are normal. Evaluation is limited due to surrounding bowel gas.  The liver appears echogenic and heterogeneous in echotexture. Surface nodularity is present. No evidence of ascites..  No focal hepatic lesions.  Portal and hepatic veins are patent and have normal flow direction and waveforms.  The gallbladder has been resected. The biliary system is nondilated.  The right kidney is normal in size with no evidence of hydronephrosis. No suspicious focal lesions.      Impression: No acute sonographic abnormality within the right upper quadrant status post cholecystectomy. There is evidence of early cirrhosis with heterogeneous nodular liver parenchyma. No focal lesions or ascites.  This report was finalized on 7/13/2022 3:34 PM by Lisa Beltre MD.        Results for orders placed during the hospital encounter of 07/11/22    Adult Transthoracic Echo Complete W/ Cont if Necessary Per Protocol    Interpretation Summary  · Estimated left ventricular EF = 65% Left ventricular ejection fraction appears to be 61 - 65%. Left ventricular systolic function is normal.  · There is calcification of the aortic valve mainly affecting the right coronary cusp(s) consistent with moderate aortic valvular sclerosis.  · Left ventricular wall thickness is consistent with mild concentric hypertrophy.  · Mild to moderate mitral valve stenosis is present with functional MAC.  · Moderate to severe tricuspid valve regurgitation is present.  · Estimated right ventricular systolic pressure from tricuspid regurgitation is mildly elevated (35-45 mmHg).  · Mild pulmonary hypertension is present.  · Systolic and diastolic flattening of the interventricular septum consistent with right  ventricle pressure and volume overload.  · The right ventricular cavity is mildly dilated with normal right ventricular wall thickness and severely reduced right ventricular systolic function; abnormal septal motion.  · There is no evidence of pericardial effusion.      I have reviewed the medications:  Scheduled Meds:bumetanide, 1 mg, Oral, BID  colchicine, 0.6 mg, Oral, Daily  ferric gluconate, 125 mg, Intravenous, Daily  insulin detemir, 12 Units, Subcutaneous, Daily  insulin lispro, 0-9 Units, Subcutaneous, TID AC  metoprolol tartrate, 12.5 mg, Oral, BID  nitrofurantoin (macrocrystal-monohydrate), 100 mg, Oral, Q12H  rivaroxaban, 15 mg, Oral, Daily With Dinner  sodium chloride, 10 mL, Intravenous, Q12H      Continuous Infusions:   PRN Meds:.•  acetaminophen **OR** acetaminophen **OR** acetaminophen  •  albuterol  •  calcium carbonate  •  dextrose  •  dextrose  •  glucagon (human recombinant)  •  sodium chloride    Assessment & Plan   Assessment & Plan     Active Hospital Problems    Diagnosis  POA   • **Acute on chronic respiratory failure (HCC) [J96.20]  Unknown   • KIMMIE (acute kidney injury) (HCC) [N17.9]  Unknown   • Typical atrial flutter (HCC) [I48.3]  Yes   • Elevated troponin [R77.8]  Unknown   • Essential hypertension [I10]  Yes   • Diabetes (HCC) [E11.9]  Yes   • Anemia [D64.9]  Yes   • Stage 3a chronic kidney disease (HCC) [N18.31]  Yes   • Anasarca and ascites [R60.1]  Unknown      Resolved Hospital Problems   No resolved problems to display.        Brief Hospital Course to date:  Ivory Carr is a 76 y.o. female with past medical history of a flutter on Xarelto status post ablation 2020, diabetes type 2, CKD stage III, severe tricuspid regurgitation, cirrhosis, and hypertension who presented to the ED from primary care provider with increased bilateral lower extremity swelling as well as abdominal distention. CT in the ED showed anasarca likely related to cirrhosis. Patient admitted to the  hospitalist for further workup.    This patient's problems and plans were partially entered by my partner and updated as appropriate by me 07/14/22.    Acute on Chronic Respiratory Failure, on RA, resolved  Elevated troponin/NOT NSTEMI/MI  Acute Diastolic Heart Failure  -diuresed on bumex, on RA, transition to PO  -resume jardiance at DC  -CT negative for PE  -ECHO reviewed, VHD, right sided failure noted  -follow up with [facility]/Dr. Garcia as well     Anasarca d/t congestive hepatopathy  -Previous paracentesis in 2020/minimal relief   -CT ABD/Pelvis: Small amount of free fluid present within the pelvis, improved as  compared to the previous study. There is anasarca the soft tissues with  a trace amount of ascites present within the upper abdomen. Findings are  likely related to cirrhosis and fluid overload.   -7/12/22 ECHO: EF of 65%, moderate aortic valvular sclerosis, MVS mild to moderate, moderate to severe tricuspid valve regurgitation, severely reduced right ventriclar systolic function  -per GI, anasarca due to congestive hepatopathy with treatment being diuresis and treatment of underlying heart disease.  -Hepatitis panel negative  -PT/ INR: 22.9/2.03- patient on Xarelto-change from 20mg to renally dosed 15mg PO daily  -TIEN antimicrosomal antibodies, ferritin, hemochromatosis mutation, anti-smooth muscle antibody titer pending  -liver ultrasound c/w cirrhotic changes     Aflutter s/p ablation 2020  -Controlled with metoprolol- decreased to 12.5mg PO BID per cardiology  -Continue Xarelto- change from 20mg to renally dosed 15mg PO daily  -Follows with Dr. Garcia     UTI/POA  -Fosfomycin x 1     Hyponatremia  -corrected with glucose, acceptable     Dizziness  -PT/OT  -cardiology following     Diabetes  -increase basal     Acute on chronic kidney disease stage 3  -stable, but monitoring on diuresis     HTN  -Hold lisinopril in setting of dizziness and KIMMIE      Chronic anemia  Iron deficiency anemia  -s/p  IV iron, trend     Previous history of hypothyroidism   -TSH normal here    B ankle pain  -Patient with concerns for cellulitis but does not appear consistent on exam  -?gout, colchicine x 1    Expected Discharge Location and Transportation: home via   Expected Discharge Date: 7/14/22    DVT prophylaxis:  Medical DVT prophylaxis orders are present.     AM-PAC 6 Clicks Score (PT): 17 (07/13/22 0800)    CODE STATUS:   Code Status and Medical Interventions:   Ordered at: 07/11/22 4646     Level Of Support Discussed With:    Patient     Code Status (Patient has no pulse and is not breathing):    CPR (Attempt to Resuscitate)     Medical Interventions (Patient has pulse or is breathing):    Full Support       Delonte John MD  07/14/22

## 2022-07-14 NOTE — SIGNIFICANT NOTE
07/14/22 1700 07/14/22 1701 07/14/22 1702   Vital Signs   Heart Rate 75 77 81   Heart Rate Source Monitor Monitor Monitor   /71 139/79 142/91   Noninvasive MAP (mmHg) 94 113 124   BP Location Left arm Left arm Left arm   BP Method Automatic Automatic Automatic   Patient Position Lying Sitting Standing

## 2022-07-14 NOTE — PLAN OF CARE
Goal Outcome Evaluation:  Plan of Care Reviewed With: patient        Progress: no change  Outcome Evaluation: Patient with moderate edema in B LE. She would benefit from skilled PT services and MLW to manage edema

## 2022-07-15 ENCOUNTER — READMISSION MANAGEMENT (OUTPATIENT)
Dept: CALL CENTER | Facility: HOSPITAL | Age: 77
End: 2022-07-15

## 2022-07-15 VITALS
WEIGHT: 186.5 LBS | TEMPERATURE: 98 F | HEART RATE: 77 BPM | HEIGHT: 67 IN | DIASTOLIC BLOOD PRESSURE: 80 MMHG | OXYGEN SATURATION: 96 % | RESPIRATION RATE: 17 BRPM | BODY MASS INDEX: 29.27 KG/M2 | SYSTOLIC BLOOD PRESSURE: 152 MMHG

## 2022-07-15 PROBLEM — J96.20 ACUTE ON CHRONIC RESPIRATORY FAILURE (HCC): Status: RESOLVED | Noted: 2022-07-11 | Resolved: 2022-07-15

## 2022-07-15 PROBLEM — R79.89 ELEVATED TROPONIN: Status: RESOLVED | Noted: 2022-07-11 | Resolved: 2022-07-15

## 2022-07-15 PROBLEM — R77.8 ELEVATED TROPONIN: Status: RESOLVED | Noted: 2022-07-11 | Resolved: 2022-07-15

## 2022-07-15 LAB
A1AT PHENOTYP SERPL IFE: ABNORMAL
A1AT SERPL-MCNC: 204 MG/DL (ref 101–187)
ANION GAP SERPL CALCULATED.3IONS-SCNC: 10 MMOL/L (ref 5–15)
BUN SERPL-MCNC: 25 MG/DL (ref 8–23)
BUN/CREAT SERPL: 18.5 (ref 7–25)
CALCIUM SPEC-SCNC: 9.2 MG/DL (ref 8.6–10.5)
CHLORIDE SERPL-SCNC: 91 MMOL/L (ref 98–107)
CO2 SERPL-SCNC: 27 MMOL/L (ref 22–29)
CREAT SERPL-MCNC: 1.35 MG/DL (ref 0.57–1)
DEPRECATED RDW RBC AUTO: 47.9 FL (ref 37–54)
EGFRCR SERPLBLD CKD-EPI 2021: 40.3 ML/MIN/1.73
ERYTHROCYTE [DISTWIDTH] IN BLOOD BY AUTOMATED COUNT: 15.6 % (ref 12.3–15.4)
GLUCOSE BLDC GLUCOMTR-MCNC: 265 MG/DL (ref 70–130)
GLUCOSE BLDC GLUCOMTR-MCNC: 322 MG/DL (ref 70–130)
GLUCOSE SERPL-MCNC: 287 MG/DL (ref 65–99)
HCT VFR BLD AUTO: 34.7 % (ref 34–46.6)
HFE GENE MUT ANL BLD/T: NORMAL
HGB BLD-MCNC: 10.8 G/DL (ref 12–15.9)
MCH RBC QN AUTO: 26.6 PG (ref 26.6–33)
MCHC RBC AUTO-ENTMCNC: 31.1 G/DL (ref 31.5–35.7)
MCV RBC AUTO: 85.5 FL (ref 79–97)
PLATELET # BLD AUTO: 210 10*3/MM3 (ref 140–450)
PMV BLD AUTO: 10.7 FL (ref 6–12)
POTASSIUM SERPL-SCNC: 4.4 MMOL/L (ref 3.5–5.2)
RBC # BLD AUTO: 4.06 10*6/MM3 (ref 3.77–5.28)
SODIUM SERPL-SCNC: 128 MMOL/L (ref 136–145)
URATE SERPL-MCNC: 8.4 MG/DL (ref 2.4–5.7)
WBC NRBC COR # BLD: 4.46 10*3/MM3 (ref 3.4–10.8)

## 2022-07-15 PROCEDURE — 99239 HOSP IP/OBS DSCHRG MGMT >30: CPT | Performed by: NURSE PRACTITIONER

## 2022-07-15 PROCEDURE — 99232 SBSQ HOSP IP/OBS MODERATE 35: CPT | Performed by: INTERNAL MEDICINE

## 2022-07-15 PROCEDURE — 63710000001 INSULIN DETEMIR PER 5 UNITS: Performed by: HOSPITALIST

## 2022-07-15 PROCEDURE — 80048 BASIC METABOLIC PNL TOTAL CA: CPT | Performed by: HOSPITALIST

## 2022-07-15 PROCEDURE — 63710000001 INSULIN LISPRO (HUMAN) PER 5 UNITS: Performed by: HOSPITALIST

## 2022-07-15 PROCEDURE — 84550 ASSAY OF BLOOD/URIC ACID: CPT | Performed by: NURSE PRACTITIONER

## 2022-07-15 PROCEDURE — 85027 COMPLETE CBC AUTOMATED: CPT | Performed by: HOSPITALIST

## 2022-07-15 PROCEDURE — 82962 GLUCOSE BLOOD TEST: CPT

## 2022-07-15 RX ORDER — BUMETANIDE 1 MG/1
1 TABLET ORAL 2 TIMES DAILY
Qty: 60 TABLET | Refills: 1 | Status: SHIPPED | OUTPATIENT
Start: 2022-07-15

## 2022-07-15 RX ORDER — COLCHICINE 0.6 MG/1
0.6 TABLET ORAL DAILY
Qty: 7 TABLET | Refills: 0 | Status: SHIPPED | OUTPATIENT
Start: 2022-07-16 | End: 2022-07-22 | Stop reason: SDUPTHER

## 2022-07-15 RX ORDER — METOPROLOL TARTRATE 50 MG/1
25 TABLET, FILM COATED ORAL 2 TIMES DAILY
Start: 2022-07-15 | End: 2022-07-22 | Stop reason: DRUGHIGH

## 2022-07-15 RX ADMIN — INSULIN LISPRO 7 UNITS: 100 INJECTION, SOLUTION INTRAVENOUS; SUBCUTANEOUS at 08:32

## 2022-07-15 RX ADMIN — COLCHICINE 0.6 MG: 0.6 TABLET, FILM COATED ORAL at 08:31

## 2022-07-15 RX ADMIN — BUMETANIDE 1 MG: 1 TABLET ORAL at 08:31

## 2022-07-15 RX ADMIN — METOPROLOL TARTRATE 12.5 MG: 25 TABLET, FILM COATED ORAL at 08:31

## 2022-07-15 RX ADMIN — INSULIN DETEMIR 12 UNITS: 100 INJECTION, SOLUTION SUBCUTANEOUS at 08:32

## 2022-07-15 RX ADMIN — INSULIN LISPRO 6 UNITS: 100 INJECTION, SOLUTION INTRAVENOUS; SUBCUTANEOUS at 12:00

## 2022-07-15 NOTE — CASE MANAGEMENT/SOCIAL WORK
Continued Stay Note  [facility]     Patient Name: Ivory Carr  MRN: [id number]  Today's Date: 7/15/2022    Admit Date: 7/11/2022     Discharge Plan     Row Name 07/15/22 1536       Plan    Plan Home    Patient/Family in Agreement with Plan yes    Plan Comments I met with Mrs. Carr and her  at the bedside. They reiterated that they did not want rehab, home health, or outpatient services of any sort. Mrs. Carr was interested in compression stockings. I spoke on the phone with [facility], and he recommended 20-25 mmHg for her compression stockings. No additional discharge needs identified.    Final Discharge Disposition Code 01 - home or self-care               Discharge Codes    No documentation.               Expected Discharge Date and Time     Expected Discharge Date Expected Discharge Time    Jul 15, 2022             Blair Monahan RN

## 2022-07-15 NOTE — PROGRESS NOTES
Ivory Carr  [id number]  1945   LOS: 4 days   Patient Care Team:  Christiane Peter APRN as PCP - General (Nurse Practitioner)    Chief Complaint:  REMOTE ASD REPAIR / REMOTE ATRIAL FLUTTER / RIGHT HEART FAILURE / DEBILITY / KIMMIE / UNCONTROLLED T2 DM / ATRIAL FIBRILLATION / PA HTN    Subjective     Comfortable sitting upright in bed off supplemental oxygen without cardiopulmonary complaint.  She has multiple concerns, issues, and questions about her atrial fibrillation, chronic dizziness, recent presyncope, thyroid dysfunction, liver dysfunction, abdominal distention and bloating, ankle pain and soreness, inability to bear weight on her feet, and occasional nonproductive cough.  No anginal type chest discomfort, sputum production, pleurisy, hemoptysis.  Acceptable appetite.  No fever or chills.  No dysuria urgency or flank pain.  No current additional GI or  difficulty.  She denies acute focal joint tenderness or swelling.  She would like to discuss her atrial fibrillation with Dr. Garcia.    Objective     Vital Sign Min/Max for last 24 hours  Temp  Min: 97.9 °F (36.6 °C)  Max: 98.3 °F (36.8 °C)   BP  Min: 110/96  Max: 156/78   Pulse  Min: 74  Max: 91   Resp  Min: 16  Max: 18   SpO2  Min: 94 %  Max: 97 %      Weight  Min: 84.6 kg (186 lb 8 oz)  Max: 84.6 kg (186 lb 8 oz)         07/14/22  0600 07/15/22  0534   Weight: 86.6 kg (190 lb 14.4 oz) 84.6 kg (186 lb 8 oz)         Intake/Output Summary (Last 24 hours) at 7/15/2022 0742  Last data filed at 7/15/2022 0534  Gross per 24 hour   Intake 1658 ml   Output 1800 ml   Net -142 ml       Physical Exam:     General Appearance:    Alert, cooperative, in no acute distress   Lungs:     Clear to auscultation,respirations regular, even and                   unlabored    Heart:    Irregular and normal rate, variable S1 and S2, grade 2/6 systolic murmur, no gallop, no rub, no click   Abdomen:  Extremities:   Soft, non-tender, bowel sounds audible x4    1+ edema, normal  range of motion   Pulses:   Pulses palpable and equal bilaterally      Results Review:   Results from last 7 days   Lab Units 07/14/22  0704 07/13/22  0953 07/13/22  0454 07/12/22 0457   SODIUM mmol/L 131* 132* 129* 134*   POTASSIUM mmol/L 4.2  --  4.7 3.8   CHLORIDE mmol/L 93*  --  90* 93*   CO2 mmol/L 29.0  --  30.0* 32.0*   BUN mg/dL 27*  --  29* 26*   CREATININE mg/dL 1.53*  --  1.55* 1.50*   GLUCOSE mg/dL 201*  --  295* 101*   CALCIUM mg/dL 9.3  --  9.1 9.0     Results from last 7 days   Lab Units 07/14/22  0704 07/13/22  0454 07/12/22  0456   WBC 10*3/mm3 4.91 4.39 4.25   HEMOGLOBIN g/dL 10.6* 10.4* 10.8*   HEMATOCRIT % 34.5 33.4* 34.4   PLATELETS 10*3/mm3 217 201 207     Results from last 7 days   Lab Units 07/12/22  1411   CHOLESTEROL mg/dL 148   TRIGLYCERIDES mg/dL 77   HDL CHOL mg/dL 35*   LDL CHOL mg/dL 98     Results from last 7 days   Lab Units 07/12/22  0456   HEMOGLOBIN A1C % 10.60*     Results from last 7 days   Lab Units 07/12/22  1411 07/12/22  0457 07/11/22  2123   TROPONIN T ng/mL 0.049* 0.055* 0.044*     · ACCU-CHEKS: [phone] mg/DL.    · NO NEW CXR / EKG / LAB RESULTS.    Medication Review: REVIEWED; NO DRIPS.    Assessment & Plan     Uncontrolled diabetes mellitus.  No new reassessment of GFR with acceptable urinary output.  No orthostatic hypotension on well-documented assessment yesterday.  May need a trial of simethicone as needed abdominal bloating or distention in view of lack of findings of ascites.  No current requirement for MPS/LHC.  Would defer BLANCA/ECV for her rate controlled atrial fibrillation which I feel is largely asymptomatic.  She will need to reassess with Dr. Garcia concerning her atrial fibrillation and whether she would manage.  From a trial of formal antiarrhythmic therapy with repeat ECV.  Hemodynamics and cardiopulmonary findings appear stable at this time.  Theoretically she would benefit from low-dose SGLT2 drug inhibitor therapy such as for Farxiga or  Jardiance 10 mg daily plus or minus finerenone.  She appears to have been adequately decongested at this point with stable hemodynamics.  Hopefully home in the next 1-2 weeks to follow-up with [facility] and Dr. Paredes in 3-4 weeks.  She needs treatment for her iron deficiency.  Her normal serum TSH value would suggest acceptable thyroid function at this time.    Discussed with patient and family member in room.      Acute on chronic respiratory failure (HCC)    Diabetes (HCC)    Anasarca and ascites    Anemia    Stage 3a chronic kidney disease (HCC)    Essential hypertension    Typical atrial flutter (HCC)    Elevated troponin    KIMMIE (acute kidney injury) ([facility])    07/15/22  07:42 EDT

## 2022-07-15 NOTE — DISCHARGE SUMMARY
[facility] Medicine Services  DISCHARGE SUMMARY    Patient Name: Ivory Carr  : 1945  MRN: [id number]    Date of Admission: 2022  8:06 PM  Date of Discharge: 07/15/22  Primary Care Physician: Christiane Peter APRN    Consults     Date and Time Order Name Status Description    2022  3:07 AM Inpatient Gastroenterology Consult Completed     2022 12:26 AM Inpatient Cardiology Consult Completed     2022 12:25 AM Inpatient Gastroenterology Consult Completed           Hospital Course     Presenting Problem:   Typical atrial flutter (HCC) [I48.3]    Active Hospital Problems    Diagnosis  POA   • KIMMIE (acute kidney injury) (HCC) [N17.9]  Yes   • Typical atrial flutter (HCC) [I48.3]  Yes   • Essential hypertension [I10]  Yes   • Diabetes (HCC) [E11.9]  Yes   • Anemia [D64.9]  Yes   • Stage 3a chronic kidney disease (HCC) [N18.31]  Yes   • Anasarca and ascites [R60.1]  Yes      Resolved Hospital Problems    Diagnosis Date Resolved POA   • **Acute on chronic respiratory failure (HCC) [J96.20] 07/15/2022 Yes   • Elevated troponin [R77.8] 07/15/2022 Yes          Hospital Course:  Ivory Carr is a 76 y.o. female  with past medical history of a flutter on Xarelto status post ablation , diabetes type 2, CKD stage III, severe tricuspid regurgitation, cirrhosis, and hypertension who presented to the ED from primary care provider with increased bilateral lower extremity swelling as well as abdominal distention. CT in the ED showed anasarca likely related to cirrhosis. Patient admitted to the hospitalist for further workup.       Acute on Chronic Respiratory Failure, on RA, resolved  Elevated troponin/NOT NSTEMI/MI  Acute Diastolic Heart Failure  -diuresed on bumex, on RA, transition to PO  -resume jardiance at DC  -CT negative for PE  -ECHO reviewed, VHD, right sided failure noted  -follow up with Heart and Valve/Dr. Garcia as well     Anasarca d/t congestive  hepatopathy  -Previous paracentesis in 2020/minimal relief   -CT ABD/Pelvis: Small amount of free fluid present within the pelvis, improved as  compared to the previous study. There is anasarca the soft tissues with  a trace amount of ascites present within the upper abdomen. Findings are likely related to cirrhosis and fluid overload.   -7/12/22 ECHO: EF of 65%, moderate aortic valvular sclerosis, MVS mild to moderate, moderate to severe tricuspid valve regurgitation, severely reduced right ventriclar systolic function  -per GI, anasarca due to congestive hepatopathy with treatment being diuresis and treatment of underlying heart disease.  -Hepatitis panel negative  -PT/ INR: 22.9/2.03- patient on Xarelto-change from 20mg to renally dosed 15mg PO daily  -TIEN antimicrosomal antibodies, ferritin, hemochromatosis mutation, anti-smooth muscle antibody titer pending  -liver ultrasound c/w cirrhotic changes  -follow up with GI     Aflutter s/p ablation 2020  -Controlled with metoprolol, home dose decreased to 25mg BID  -Continue Xarelto- change from 20mg to renally dosed 15mg PO daily  -Follows with Dr. Garcia, follow up 4 weeks     UTI/POA  -Fosfomycin x 1     Hyponatremia  -corrected with glucose, acceptable     Dizziness  -PT/OT  -cardiology following     Diabetes  -multiple insulins listed on home med rec  -A1C >10  -only continuing 70/30 TID and jardiance  -concern for hypoglycemia with lantus and 70/30 at home  -pt does not check FSBS frequently     Acute on chronic kidney disease stage 3  -stable, but monitoring on diuresis     HTN  -Hold lisinopril in setting of dizziness and KIMMIE  -consider restarting if creatinine continues to improve     Chronic anemia  Iron deficiency anemia  -s/p IV iron     Previous history of hypothyroidism   -TSH normal here     B ankle pain  ?Gout  -Patient with concerns for cellulitis but does not appear consistent on exam  -uric acid elevated  -follow up with PCP, consider XR if  discomfort continues      Discharge Follow Up Recommendations for outpatient labs/diagnostics:  PCP 1 week  Heart and Valve clinic 1-2 weeks  Dr. Garcia 4 weeks  GI 4 weeks    Day of Discharge     HPI:   Denies SOB with rest, only with activity  Bilateral ankles remain tender    Review of Systems  Gen- No fevers, chills  CV- No chest pain, palpitations  Resp- No cough, dyspnea  GI- No N/V/D, abd pain      Vital Signs:   Temp:  [97.9 °F (36.6 °C)-98.1 °F (36.7 °C)] 98 °F (36.7 °C)  Heart Rate:  [74-86] 86  Resp:  [16-18] 17  BP: (110-156)/(71-96) 152/80      Physical Exam:  Constitutional: No acute distress, awake, alert  HENT: NCAT, mucous membranes moist  Respiratory: fine crackles bibasilar, respiratory effort normal, sats stable on RA  Cardiovascular: irregular, no murmurs, rubs, or gallops  Gastrointestinal: Positive bowel sounds, soft, nontender, mild distention  Musculoskeletal: Trace bilateral ankle edema.  BLE wrapped  Psychiatric: Appropriate affect, cooperative  Neurologic: Oriented x 3, NULL, speech clear  Skin: No rashes notes      Pertinent  and/or Most Recent Results     LAB RESULTS:      Lab 07/15/22  0825 07/14/22  0704 07/13/22  0454 07/12/22  0456 07/11/22  2123 07/11/22  1648   WBC 4.46 4.91 4.39 4.25  --  5.05   HEMOGLOBIN 10.8* 10.6* 10.4* 10.8*  --  9.9*   HEMATOCRIT 34.7 34.5 33.4* 34.4  --  31.0*   PLATELETS 210 217 201 207  --  207   NEUTROS ABS  --   --  2.45 2.56  --  3.23   IMMATURE GRANS (ABS)  --   --  0.01 0.01  --  0.01   LYMPHS ABS  --   --  1.38 1.22  --  1.40   MONOS ABS  --   --  0.45 0.36  --  0.34   EOS ABS  --   --  0.08 0.08  --  0.05   MCV 85.5 85.2 85.4 84.9  --  84.9   PROCALCITONIN  --   --   --   --   --  0.11   LDH  --   --   --   --  283*  --    PROTIME  --   --   --   --   --  22.9*   D DIMER QUANT  --   --   --   --   --  3.02*         Lab 07/15/22  0825 07/14/22  0704 07/13/22  0953 07/13/22  0454 07/12/22  0457 07/12/22  0456 07/11/22  2123 07/11/22  1648   SODIUM  128* 131* 132* 129* 134*  --   --  132*   POTASSIUM 4.4 4.2  --  4.7 3.8  --   --  4.2   CHLORIDE 91* 93*  --  90* 93*  --   --  93*   CO2 27.0 29.0  --  30.0* 32.0*  --   --  27.0   ANION GAP 10.0 9.0  --  9.0 9.0  --   --  12.0   BUN 25* 27*  --  29* 26*  --   --  28*   CREATININE 1.35* 1.53*  --  1.55* 1.50*  --   --  1.44*   EGFR 40.3* 34.7*  --  34.2* 35.5*  --   --  37.3*   GLUCOSE 287* 201*  --  295* 101*  --   --  237*   CALCIUM 9.2 9.3  --  9.1 9.0  --  8.8 9.6   MAGNESIUM  --   --   --  2.2 2.2  --   --  2.1   PHOSPHORUS  --  3.5  --  4.3 4.8*  --   --   --    HEMOGLOBIN A1C  --   --   --   --   --  10.60*  --   --    TSH  --   --   --   --   --   --  3.730  --          Lab 07/14/22  0704 07/13/22  0454 07/12/22 0457 07/11/22  1648   TOTAL PROTEIN 6.9 6.7 7.1 7.0   ALBUMIN 3.60 3.70 3.90 3.70   GLOBULIN 3.3 3.0 3.2 3.3   ALT (SGPT) 11 11 14 13   AST (SGOT) 16 15 18 21   BILIRUBIN 0.4 0.4 0.6 0.4   ALK PHOS 60 63 65 64         Lab 07/12/22  1411 07/12/22  0457 07/11/22  2123 07/11/22  1648   PROBNP  --   --   --  1,114.0   TROPONIN T 0.049* 0.055* 0.044* 0.053*   PROTIME  --   --   --  22.9*   INR  --   --   --  2.03*         Lab 07/12/22  1411   CHOLESTEROL 148   LDL CHOL 98   HDL CHOL 35*   TRIGLYCERIDES 77         Lab 07/12/22  0457   IRON 33*   IRON SATURATION 6*   TIBC 554*   TRANSFERRIN 372*   FERRITIN 34.17         Brief Urine Lab Results  (Last result in the past 365 days)      Color   Clarity   Blood   Leuk Est   Nitrite   Protein   CREAT   Urine HCG        07/12/22 1217 Yellow   Clear   Negative   Small (1+)   Negative   Negative               Microbiology Results (last 10 days)     Procedure Component Value - Date/Time    Urine Culture - Urine, Urine, Clean Catch [[id number]]  (Abnormal)  (Susceptibility) Collected: 07/12/22 1217    Lab Status: Final result Specimen: Urine, Clean Catch Updated: 07/14/22 0759     Urine Culture >100,000 CFU/mL Escherichia coli ESBL     Comment: Consider infectious  disease consult.  Susceptibility results may not correlate to clinical outcomes.       Narrative:      Colonization of the urinary tract without infection is common. Treatment is discouraged unless the patient is symptomatic, pregnant, or undergoing an invasive urologic procedure.    Susceptibility      Escherichia coli ESBL      YISSEL      Ertapenem Susceptible      Gentamicin Susceptible      Levofloxacin Resistant     Meropenem Susceptible      Nitrofurantoin Susceptible      Piperacillin + Tazobactam Susceptible      Trimethoprim + Sulfamethoxazole Resistant                          Urine Culture - Urine, Urine, Catheter In/Out [[id number]]  (Abnormal)  (Susceptibility) Collected: 07/12/22 0022    Lab Status: Final result Specimen: Urine, Catheter In/Out Updated: 07/14/22 7715     Urine Culture >100,000 CFU/mL Escherichia coli ESBL     Comment: Consider infectious disease consult.  Susceptibility results may not correlate to clinical outcomes.       Narrative:      Colonization of the urinary tract without infection is common. Treatment is discouraged unless the patient is symptomatic, pregnant, or undergoing an invasive urologic procedure.    Susceptibility      Escherichia coli ESBL      YISSEL      Ertapenem Susceptible      Gentamicin Susceptible      Levofloxacin Resistant     Meropenem Susceptible      Nitrofurantoin Susceptible      Piperacillin + Tazobactam Susceptible      Trimethoprim + Sulfamethoxazole Resistant                          COVID PRE-OP / PRE-PROCEDURE SCREENING ORDER (NO ISOLATION) - Swab, Nasopharynx [[id number]]  (Normal) Collected: 07/11/22 2119    Lab Status: Final result Specimen: Swab from Nasopharynx Updated: 07/11/22 3371    Narrative:      The following orders were created for panel order COVID PRE-OP / PRE-PROCEDURE SCREENING ORDER (NO ISOLATION) - Swab, Nasopharynx.  Procedure                               Abnormality         Status                     ---------                                -----------         ------                     COVID-19 and FLU A/B PCR...[[id number]]  Normal              Final result                 Please view results for these tests on the individual orders.    COVID-19 and FLU A/B PCR - Swab, Nasopharynx [[id number]]  (Normal) Collected: 07/11/22 2119    Lab Status: Final result Specimen: Swab from Nasopharynx Updated: 07/11/22 2245     COVID19 Not Detected     Influenza A PCR Not Detected     Influenza B PCR Not Detected    Narrative:      Fact sheet for providers: https://www.fda.gov/media/443724/download    Fact sheet for patients: https://www.fda.gov/media/166890/download    Test performed by PCR.          Adult Transthoracic Echo Complete W/ Cont if Necessary Per Protocol    Result Date: 7/12/2022  · Estimated left ventricular EF = 65% Left ventricular ejection fraction appears to be 61 - 65%. Left ventricular systolic function is normal. · There is calcification of the aortic valve mainly affecting the right coronary cusp(s) consistent with moderate aortic valvular sclerosis. · Left ventricular wall thickness is consistent with mild concentric hypertrophy. · Mild to moderate mitral valve stenosis is present with functional MAC. · Moderate to severe tricuspid valve regurgitation is present. · Estimated right ventricular systolic pressure from tricuspid regurgitation is mildly elevated (35-45 mmHg). · Mild pulmonary hypertension is present. · Systolic and diastolic flattening of the interventricular septum consistent with right ventricle pressure and volume overload. · The right ventricular cavity is mildly dilated with normal right ventricular wall thickness and severely reduced right ventricular systolic function; abnormal septal motion. · There is no evidence of pericardial effusion.      CT Abdomen Pelvis With Contrast    Result Date: 7/11/2022  CT ABDOMEN PELVIS W CONTRAST-  Date of Exam: 7/11/2022 8:59 PM  Indication: Abdominal pain, acute (Ped 0-17y).   Comparison: CT 10/07/2020  Technique: Contiguous axial CT images were obtained from the lung bases to the superior iliac crests with contrast.  Following uneventful administration of 100 cc of Isovue-370 intravenous and oral contrast, contiguous axial images obtained from lung bases to the pubic symphysis. Sagittal and coronal reconstructions were performed.  Automated exposure control and iterative reconstruction methods were used.  FINDINGS: Lower Thorax: Please see chest CT  Liver: Normal size and density. Surface nodularity is present. No focal lesions identified. Trace amount of ascites is present within the upper abdomen.  Gallbladder: The gallbladder appears normal in size with no evidence of radiopaque gallstones. The biliary tree is nondilated.  Pancreas: No focal masses.  No pancreatic duct dilation.  Spleen: Spleen is normal size.  No focal splenic lesions.  Adrenal glands: Unremarkable.  Kidneys: The kidneys appear normal in size. No evidence of nephrolithiasis. No evidence of hydronephrosis. No suspicious focal lesions identified.  Retroperitoneum/vascular: No retroperitoneal adenopathy identified. No aneurysmal dilatation of the vascular structures. Atherosclerotic calcifications are present.  Stomach and Bowel: No abnormally dilated loops of bowel no definite mass identified. No significant bowel wall thickening. No free fluid. No focal fluid collections identified. The appendix appears within normal limits. No mesenteric adenopathy identified. No evidence of free air.  Bladder: The bladder appears unremarkable.  Pelvic Organs: A fibroid uterus is present. A small amount of free fluid is present within the pelvis, improved as compared to the previous study. There is diastases of the rectus abdominal musculature. There is mild diffuse anasarca of the soft tissues.  Osseous structures: No aggressive focal lytic or sclerotic osseous lesions. No acute osseous abnormality.       1. Small amount of free  fluid present within the pelvis, improved as compared to the previous study. There is anasarca the soft tissues with a trace amount of ascites present within the upper abdomen. Findings are likely related to cirrhosis and fluid overload. No acute intra-abdominal or pelvic process. 2. Ancillary findings as described above.  This report was finalized on 7/11/2022 9:17 PM by Lisa Beltre MD.      US Liver    Result Date: 7/13/2022  US LIVER-  Date of Exam: 7/13/2022 10:30 AM  Indication: cirrhosis/ascites; I48.3-Typical atrial flutter; R60.9-Edema, unspecified; E87.70-Fluid overload, unspecified; I50.9-Heart failure, unspecified; R77.8-Other specified abnormalities of plasma proteins.  Comparison: CT 07/11/2022  Technique: Transverse and sagittal ultrasound images of the right upper quadrant were obtained. Doppler evaluation was also conducted.  FINDINGS: Visualized portions of the head and body of the pancreas are normal. Evaluation is limited due to surrounding bowel gas.  The liver appears echogenic and heterogeneous in echotexture. Surface nodularity is present. No evidence of ascites..  No focal hepatic lesions.  Portal and hepatic veins are patent and have normal flow direction and waveforms.  The gallbladder has been resected. The biliary system is nondilated.  The right kidney is normal in size with no evidence of hydronephrosis. No suspicious focal lesions.      No acute sonographic abnormality within the right upper quadrant status post cholecystectomy. There is evidence of early cirrhosis with heterogeneous nodular liver parenchyma. No focal lesions or ascites.  This report was finalized on 7/13/2022 3:34 PM by Lisa Beltre MD.      XR Chest 1 View    Result Date: 7/11/2022  Examination: XR CHEST 1 VW-  Date of Exam: 7/11/2022 5:47 PM  Indication: SOA triage protocol.  Comparison: Radiograph 10/06/2020  Technique: 1 view of the chest  Findings: There is a small right-sided pleural effusion. Mild atelectatic  changes are present within the right lung base. Median sternotomy wires are present. No pneumothorax. The heart size is normal. The pulmonary vasculature appears within normal limits. No acute osseous abnormality identified.      Small right-sided pleural effusion with overlying atelectasis, similar as compared to the previous study from 10/06/2020.  This report was finalized on 7/11/2022 6:15 PM by Lisa Beltre MD.      CT Angiogram Chest    Result Date: 7/11/2022  DATE OF EXAM: 7/11/2022 8:59 PM  PROCEDURE: CT ANGIOGRAM CHEST-  INDICATIONS: pulmonary embolism rule out; I48.3-Typical atrial flutter; R60.9-Edema, unspecified; E87.70-Fluid overload, unspecified; I50.9-Heart failure, unspecified; R77.8-Other specified abnormalities of plasma proteins  COMPARISON: No comparisons available.  TECHNIQUE: Contiguous axial imaging was obtained from the thoracic inlet through the upper abdomen following the intravenous administration of 100 mL of Isovue 370. Reconstructed coronal and sagittal images were also obtained. Automated exposure control and iterative reconstruction methods were used.  The radiation dose reduction device was turned on for each scan per the ALARA (As Low as Reasonably Achievable) protocol.  FINDINGS: There is no evidence of pulmonary embolism. The heart appears normal in size. A small to moderate-sized right-sided pleural effusion is present. Atherosclerotic calcifications are present. No focal consolidations. Mild scarring is present within the lung bases bilaterally. Median sternotomy wires are present. No pericardial effusion identified. No evidence of adenopathy. No acute osseous abnormality identified. Mild degenerative changes are present within the spine.        1. No evidence of pulmonary embolism. 2. Small to moderate-sized right-sided pleural effusion likely related to fluid overload. 3. Ancillary findings as described above.  This report was finalized on 7/11/2022 9:19 PM by Lisa Beltre  MD.          Results for orders placed during the hospital encounter of 07/11/22    Adult Transthoracic Echo Complete W/ Cont if Necessary Per Protocol    Interpretation Summary  · Estimated left ventricular EF = 65% Left ventricular ejection fraction appears to be 61 - 65%. Left ventricular systolic function is normal.  · There is calcification of the aortic valve mainly affecting the right coronary cusp(s) consistent with moderate aortic valvular sclerosis.  · Left ventricular wall thickness is consistent with mild concentric hypertrophy.  · Mild to moderate mitral valve stenosis is present with functional MAC.  · Moderate to severe tricuspid valve regurgitation is present.  · Estimated right ventricular systolic pressure from tricuspid regurgitation is mildly elevated (35-45 mmHg).  · Mild pulmonary hypertension is present.  · Systolic and diastolic flattening of the interventricular septum consistent with right ventricle pressure and volume overload.  · The right ventricular cavity is mildly dilated with normal right ventricular wall thickness and severely reduced right ventricular systolic function; abnormal septal motion.  · There is no evidence of pericardial effusion.      Pending Labs     Order Current Status    Hemochromatosis Mutation In process        Discharge Details        Discharge Medications      New Medications      Instructions Start Date   bumetanide 1 MG tablet  Commonly known as: BUMEX   1 mg, Oral, 2 Times Daily      colchicine 0.6 MG tablet   0.6 mg, Oral, Daily   Start Date: July 16, 2022     PHARMACY MEDS TO BED CONSULT   Does not apply, Daily         Changes to Medications      Instructions Start Date   metoprolol tartrate 50 MG tablet  Commonly known as: LOPRESSOR  What changed: how much to take   25 mg, Oral, 2 Times Daily      rivaroxaban 15 MG tablet  Commonly known as: XARELTO  What changed:   · medication strength  · how much to take   15 mg, Oral, Daily         Continue These  Medications      Instructions Start Date   Jardiance 25 MG tablet tablet  Generic drug: empagliflozin   1 tablet, Oral, Daily      NovoLIN 70/30 (70-30) 100 UNIT/ML injection  Generic drug: insulin NPH-insulin regular   20 Units, Subcutaneous, 3 Times Daily         Stop These Medications    furosemide 20 MG tablet  Commonly known as: LASIX     insulin glargine 100 UNIT/ML injection  Commonly known as: LANTUS, SEMGLEE     insulin regular 100 UNIT/ML injection  Commonly known as: humuLIN R,novoLIN R     lisinopril 20 MG tablet  Commonly known as: PRINIVIL,ZESTRIL            Allergies   Allergen Reactions   • Statins Myalgia         Discharge Disposition:  Home or Self Care    Diet:  Hospital:  Diet Order   Procedures   • Diet Regular; Cardiac, Consistent Carbohydrate       Activity:  Activity Instructions     Activity as Tolerated                 CODE STATUS:    Code Status and Medical Interventions:   Ordered at: 07/11/22 2119     Level Of Support Discussed With:    Patient     Code Status (Patient has no pulse and is not breathing):    CPR (Attempt to Resuscitate)     Medical Interventions (Patient has pulse or is breathing):    Full Support       No future appointments.    Additional Instructions for the Follow-ups that You Need to Schedule     Discharge Follow-up with PCP   As directed       Currently Documented PCP:    Christiane Peter APRN    PCP Phone Number:    [phone]     Follow Up Details: 1 week         Discharge Follow-up with Specified Provider: Cardiology[facility]; 1 Month   As directed      To: Cardiology/[facility]    Follow Up: 1 Month         Discharge Follow-up with Specified Provider: [facility]; 1 Month   As directed      To: [facility]    Follow Up: 1 Month         Discharge Follow-up with Specified Provider: Heart and valve clinic; 2 Weeks   As directed      To: Heart and valve clinic    Follow Up: 2 Weeks                 TEAGAN Kwan  07/15/22      Time Spent on Discharge:  I spent 40 minutes on this  discharge activity which included: face-to-face encounter with the patient, reviewing the data in the system, coordination of the care with the nursing staff as well as consultants, documentation, and entering orders.

## 2022-07-16 NOTE — OUTREACH NOTE
Prep Survey    Flowsheet Row Responses   [facility] facility patient discharged from? [facility]   Is LACE score < 7 ? No   Emergency Room discharge w/ pulse ox? No   Eligibility Readm Mgmt   Discharge diagnosis acute kidney injury **Acute on chronic respiratory failure    Does the patient have one of the following disease processes/diagnoses(primary or secondary)? Other   Does the patient have Home health ordered? No   Is there a DME ordered? No   Prep survey completed? Yes          NANCY LOAIZA - Registered Nurse

## 2022-07-19 ENCOUNTER — READMISSION MANAGEMENT (OUTPATIENT)
Dept: CALL CENTER | Facility: HOSPITAL | Age: 77
End: 2022-07-19

## 2022-07-19 NOTE — OUTREACH NOTE
Medical Week 1 Survey    Flowsheet Row Responses   [facility] patient discharged from? [place]   Does the patient have one of the following disease processes/diagnoses(primary or secondary)? Other   Week 1 attempt successful? Yes   Call start time 1154   Call end time 1156   Discharge diagnosis acute kidney injury **Acute on chronic respiratory failure    Is patient permission given to speak with other caregiver? Yes   List who call center can speak with friend- Urmila   Person spoke with today (if not patient) and relationship friend   Does the patient have all medications ordered at discharge? Yes   Is the patient taking all medications as directed (includes completed medication regime)? Yes   Comments regarding appointments cardiology appt on 7/22   Does the patient have a primary care provider?  Yes   Does the patient have an appointment with their PCP within 7 days of discharge? Greater than 7 days   Comments regarding PCP PCP 7/26   Nursing Interventions Verified appointment date/time/provider   Has the patient kept scheduled appointments due by today? N/A   Has home health visited the patient within 72 hours of discharge? N/A   Psychosocial issues? No   Did the patient receive a copy of their discharge instructions? Yes   What is the patient's perception of their health status since discharge? Improving   Is the patient/caregiver able to teach back the hierarchy of who to call/visit for symptoms/problems? PCP, Specialist, Home health nurse, Urgent Care, ED, 911 Yes   Week 1 call completed? Yes   Wrap up additional comments Per Urmila, patient is doing well, no questions.          PEPE SIMENTAL - Registered Nurse

## 2022-07-22 ENCOUNTER — LAB (OUTPATIENT)
Dept: LAB | Facility: HOSPITAL | Age: 77
End: 2022-07-22

## 2022-07-22 ENCOUNTER — OFFICE VISIT (OUTPATIENT)
Dept: CARDIOLOGY | Facility: HOSPITAL | Age: 77
End: 2022-07-22

## 2022-07-22 VITALS
HEART RATE: 68 BPM | SYSTOLIC BLOOD PRESSURE: 141 MMHG | OXYGEN SATURATION: 98 % | RESPIRATION RATE: 16 BRPM | HEIGHT: 67 IN | TEMPERATURE: 96.4 F | BODY MASS INDEX: 29.27 KG/M2 | DIASTOLIC BLOOD PRESSURE: 68 MMHG | WEIGHT: 186.5 LBS

## 2022-07-22 DIAGNOSIS — N18.31 STAGE 3A CHRONIC KIDNEY DISEASE: ICD-10-CM

## 2022-07-22 DIAGNOSIS — E87.1 ACUTE HYPONATREMIA: ICD-10-CM

## 2022-07-22 DIAGNOSIS — E87.79 OTHER HYPERVOLEMIA: Primary | ICD-10-CM

## 2022-07-22 DIAGNOSIS — I10 ESSENTIAL HYPERTENSION: ICD-10-CM

## 2022-07-22 DIAGNOSIS — R60.1 ANASARCA: ICD-10-CM

## 2022-07-22 LAB
ANION GAP SERPL CALCULATED.3IONS-SCNC: 7 MMOL/L (ref 5–15)
BUN SERPL-MCNC: 21 MG/DL (ref 8–23)
BUN/CREAT SERPL: 13 (ref 7–25)
CALCIUM SPEC-SCNC: 9.2 MG/DL (ref 8.6–10.5)
CHLORIDE SERPL-SCNC: 93 MMOL/L (ref 98–107)
CO2 SERPL-SCNC: 34 MMOL/L (ref 22–29)
CREAT SERPL-MCNC: 1.61 MG/DL (ref 0.57–1)
EGFRCR SERPLBLD CKD-EPI 2021: 32.6 ML/MIN/1.73
GLUCOSE SERPL-MCNC: 210 MG/DL (ref 65–99)
POTASSIUM SERPL-SCNC: 4.4 MMOL/L (ref 3.5–5.2)
SODIUM SERPL-SCNC: 134 MMOL/L (ref 136–145)

## 2022-07-22 PROCEDURE — 36415 COLL VENOUS BLD VENIPUNCTURE: CPT

## 2022-07-22 PROCEDURE — 99214 OFFICE O/P EST MOD 30 MIN: CPT | Performed by: NURSE PRACTITIONER

## 2022-07-22 PROCEDURE — 80048 BASIC METABOLIC PNL TOTAL CA: CPT

## 2022-07-22 RX ORDER — COLCHICINE 0.6 MG/1
0.6 TABLET ORAL DAILY
Qty: 7 TABLET | Refills: 0 | Status: SHIPPED | OUTPATIENT
Start: 2022-07-22

## 2022-07-25 NOTE — PROGRESS NOTES
"Chief Complaint  Follow-up, Atrial Fibrillation, Edema, and Shortness of Breath    Subjective    History of Present Illness {CC  Problem List  Visit  Diagnosis   Encounters  Notes  Medications  Labs  Result Review Imaging  Media :23}       History of Present Illness   76-year-old female presents the office today for ongoing evaluation of her acute on chronic diastolic heart failure. Patient has a history of atrial flutter (status post ablation in 2020), type 2 diabetes mellitus, chronic kidney disease stage III, severe tricuspid regurgitation, cirrhosis and hypertension who presented to the ED with bilateral lower extremity edema and distention.  She was admitted for acute on chronic respiratory failure and acute diastolic heart failure.  She was diuresed while hospitalized.  Echo showed an EF of 65%, moderate aortic valvular sclerosis, MVS mild to moderate, moderate to severe tricuspid valve regurgitation, severely reduced right ventricular systolic function.  She was evaluated by GI and anasarca was noted due to congestive hepatopathy with treatment being diuresis and treatment of underlying heart disease.  She notes today that she is feeling somewhat better but still notes ongoing pedal edema and abdominal fullness.  Notes compliance with her medications.  Currently also notes dyspnea on exertion.  Objective     Vital Signs:   Vitals:    07/22/22 0950   BP: 141/68   BP Location: Left arm   Patient Position: Sitting   Cuff Size: Adult   Pulse: 68   Resp: 16   Temp: 96.4 °F (35.8 °C)   TempSrc: Temporal   SpO2: 98%   Weight: 84.6 kg (186 lb 8 oz)   Height: 170.2 cm (67\")     Body mass index is 29.21 kg/m².  Physical Exam  Vitals and nursing note reviewed.   Constitutional:       Appearance: Normal appearance.   HENT:      Head: Normocephalic.   Eyes:      Pupils: Pupils are equal, round, and reactive to light.   Cardiovascular:      Rate and Rhythm: Normal rate and regular rhythm.      Pulses: Normal " pulses.      Heart sounds: Normal heart sounds. No murmur heard.  Pulmonary:      Effort: Pulmonary effort is normal.      Breath sounds: Rales present.   Abdominal:      General: Bowel sounds are normal. There is distension.   Musculoskeletal:         General: Normal range of motion.      Cervical back: Normal range of motion.      Right lower leg: Edema (1+pitting ) present.      Left lower leg: Edema (1+ pitting ) present.   Skin:     General: Skin is warm and dry.      Capillary Refill: Capillary refill takes less than 2 seconds.   Neurological:      Mental Status: She is alert and oriented to person, place, and time.   Psychiatric:         Mood and Affect: Mood normal.         Thought Content: Thought content normal.              Result Review  Data Reviewed:{ Labs  Result Review  Imaging  Med Tab  Media :23}     Basic Metabolic Panel (07/15/2022 08:25)  CBC (No Diff) (07/15/2022 08:25)  Uric Acid (07/15/2022 08:25)             Assessment and Plan {CC Problem List  Visit Diagnosis  ROS  Review (Popup)  Health Maintenance  Quality  BestPractice  Medications  SmartSets  SnapShot Encounters  Media :23}   1. Other hypervolemia  Take 1 extra dose of Bumex 1 mg for the next 3 days only then resume 1 mg twice daily    2. Stage 3a chronic kidney disease (HCC)  Stable    3. Essential hypertension  Well-controlled on Lopressor  - Basic Metabolic Panel; Future    4. Acute hyponatremia  Continue fluid restriction  - Basic Metabolic Panel; Future    5. Anasarca and ascites  Continue Bumex      I spent 28 minutes caring for Ivory on this date of service. This time includes time spent by me in the following activities:preparing for the visit, reviewing tests, obtaining and/or reviewing a separately obtained history, performing a medically appropriate examination and/or evaluation , counseling and educating the patient/family/caregiver, ordering medications, tests, or procedures, documenting information in  the medical record and care coordination    Follow Up {Instructions Charge Capture  Follow-up Communications :23}   Return if symptoms worsen or fail to improve.    Patient was given instructions and counseling regarding her condition or for health maintenance advice. Please see specific information pulled into the AVS if appropriate.  Patient was instructed to call the [facility] with any questions, concerns, or worsening symptoms.

## 2022-08-22 ENCOUNTER — OFFICE VISIT (OUTPATIENT)
Dept: CARDIOLOGY | Facility: CLINIC | Age: 77
End: 2022-08-22

## 2022-08-22 VITALS
BODY MASS INDEX: 27.78 KG/M2 | OXYGEN SATURATION: 96 % | SYSTOLIC BLOOD PRESSURE: 136 MMHG | HEART RATE: 73 BPM | WEIGHT: 177 LBS | HEIGHT: 67 IN | DIASTOLIC BLOOD PRESSURE: 66 MMHG

## 2022-08-22 DIAGNOSIS — Q21.10 ASD (ATRIAL SEPTAL DEFECT): ICD-10-CM

## 2022-08-22 DIAGNOSIS — I10 ESSENTIAL HYPERTENSION: ICD-10-CM

## 2022-08-22 DIAGNOSIS — I48.0 PAROXYSMAL ATRIAL FIBRILLATION: Primary | ICD-10-CM

## 2022-08-22 DIAGNOSIS — I07.1 SEVERE TRICUSPID REGURGITATION: ICD-10-CM

## 2022-08-22 DIAGNOSIS — I48.3 TYPICAL ATRIAL FLUTTER: ICD-10-CM

## 2022-08-22 DIAGNOSIS — I10 ESSENTIAL HYPERTENSION: Primary | ICD-10-CM

## 2022-08-22 DIAGNOSIS — D50.0 IRON DEFICIENCY ANEMIA DUE TO CHRONIC BLOOD LOSS: ICD-10-CM

## 2022-08-22 DIAGNOSIS — I48.92 ATRIAL FLUTTER, UNSPECIFIED TYPE: ICD-10-CM

## 2022-08-22 PROCEDURE — 99214 OFFICE O/P EST MOD 30 MIN: CPT | Performed by: INTERNAL MEDICINE

## 2022-11-16 NOTE — PROGRESS NOTES
OFFICE VISIT  NOTE  [facility]      Name: Ivory Carr    Date: 2022  MRN:  [id number]  :  1945      REFERRING/PRIMARY PROVIDER:  Christiane Peter APRN     Chief Complaint   Patient presents with   • severe tricuspid regurgitation     PROBLEM LIST:   1. Paroxysmal atrial flutter  a. Diagnosed approximately   b. ECV x3, last in 2020  c. Echo 2020: biatrial enlargement, normal LVSF with abnormal septal motion;  moderately enlarged RV with normal contractility, mild MR, mod TR  d. Recurrent atrial flutter with RVR August and 2020   2. History of surgical ASD repair, , [place]  3. Hypertension   4. Hyperlipidemia intolerant to Lipitor   5. DM2 with peripheral neuropathy   6. Varicose veins   7. Lumbar spine disease  8. Recent cholecystitis/pancreatitis, s/p cholecystectomy 2020     HPI: Ivory Carr is a 77 y.o. female who presents today for follow up of CHF, PAF. She follows with Dr. Garcia as well in terms of her Aflutter.  History of HFpEF, admitted  for volume overload, Dr. Tiwari saw her, change Lasix to Bumex, prescribed Jardiance, she is on rivaroxaban for paroxysmal atrial flutter.  She is doing much better since discharge, no shortness of breath or volume overload.  She is asking if she can take anything besides Tylenol for aches and pains.  Echo 2022 showed EF 60 to 65%, mild LVH, mild to moderate mitral stenosis, moderate to severe tricuspid regurgitation, RVSP 35 to 45 mmHg.    ROS:Pertinent positives as listed in the HPI.  All other systems reviewed and negative.    Past Medical History:   Diagnosis Date   • ASD (atrial septal defect)    • Atrial flutter (HCC) 2020    Added automatically from request for surgery [id number]   • CKD (chronic kidney disease) 10/6/2020   • Diabetes (HCC)        Past Surgical History:   Procedure Laterality Date   • CARDIAC CATHETERIZATION N/A 10/9/2020    Procedure: Right  "Heart Cath;  Surgeon: Irvin Mccray MD;  Location:  [facility] CATH [facility]ASIVE [facility];  Service: Cardiology;  Laterality: N/A;   • CARDIAC ELECTROPHYSIOLOGY PROCEDURE N/A 10/15/2020    Procedure: Ablation atrial flutter;  Surgeon: Joseph Garcia DO;  Location:  [facility] EP INVASIVE LOCATION;  Service: Cardiovascular;  Laterality: N/A;   • CHOLECYSTECTOMY         Social History     Socioeconomic History   • Marital status:    Tobacco Use   • Smoking status: Never   • Smokeless tobacco: Never   Substance and Sexual Activity   • Alcohol use: Never   • Drug use: Never   • Sexual activity: Defer       Family History   Problem Relation Age of Onset   • Hypertension Mother    • Hyperlipidemia Mother    • Diabetes Mother    • Emphysema Father    • Other Son         HEART PROBLEMS   • Diabetes Sister    • Other Brother         HEART PROBLEMS   • Lung disease Sister    • No Known Problems Sister    • Diabetes Sister    • Diabetes Sister    • Other Brother         HEART PROBLEMS   • Other Brother         HEART PROBLEMS   • Other Brother         HEART PROBLEMS   • Other Brother         HEART PROBLEMS        Allergies   Allergen Reactions   • Statins Myalgia       Current Outpatient Medications   Medication Instructions   • acetaminophen-codeine (TYLENOL #3) 300-30 MG per tablet As Needed   • bumetanide (BUMEX) 1 mg, Oral, 2 Times Daily   • colchicine 0.6 mg, Oral, Daily   • Jardiance 25 MG tablet tablet 1 tablet, Oral, Daily   • metoprolol tartrate (LOPRESSOR) 25 mg, Oral, 2 Times Daily   • NovoLIN 70/30 (70-30) 100 UNIT/ML injection 20 Units, Subcutaneous, 3 Times Daily   • Xarelto 15 mg, Oral, Daily       Vitals:    11/17/22 1429 11/17/22 1508   BP: 152/72 146/70   BP Location: Left arm    Patient Position: Sitting    Cuff Size: Adult    Pulse: 82    SpO2: 97%    Weight: 79.8 kg (176 lb)    Height: 170.2 cm (67\")      Body mass index is 27.57 kg/m².    PHYSICAL EXAM:    General Appearance:   · well developed  · well " nourished  Neck:  · thyroid not enlarged  · supple  Respiratory:  · no respiratory distress  · normal breath sounds  · no rales  Cardiovascular:  · no jugular venous distention  · regular rhythm  · apical impulse normal  · S1 normal, S2 normal  · no S3, no S4   · no murmur  · no rub, no thrill  · carotid pulses normal; no bruit  · pedal pulses normal  · lower extremity edema: none    Skin:   warm, dry    RESULTS:   Procedures    Results for orders placed during the hospital encounter of 07/11/22    Adult Transthoracic Echo Complete W/ Cont if Necessary Per Protocol    Interpretation Summary  · Estimated left ventricular EF = 65% Left ventricular ejection fraction appears to be 61 - 65%. Left ventricular systolic function is normal.  · There is calcification of the aortic valve mainly affecting the right coronary cusp(s) consistent with moderate aortic valvular sclerosis.  · Left ventricular wall thickness is consistent with mild concentric hypertrophy.  · Mild to moderate mitral valve stenosis is present with functional MAC.  · Moderate to severe tricuspid valve regurgitation is present.  · Estimated right ventricular systolic pressure from tricuspid regurgitation is mildly elevated (35-45 mmHg).  · Mild pulmonary hypertension is present.  · Systolic and diastolic flattening of the interventricular septum consistent with right ventricle pressure and volume overload.  · The right ventricular cavity is mildly dilated with normal right ventricular wall thickness and severely reduced right ventricular systolic function; abnormal septal motion.  · There is no evidence of pericardial effusion.        Labs:  Lab Results   Component Value Date    CHOL 148 07/12/2022    TRIG 77 07/12/2022    HDL 35 (L) 07/12/2022    LDL 98 07/12/2022    AST 16 07/14/2022    ALT 11 07/14/2022     Lab Results   Component Value Date    HGBA1C 10.60 (H) 07/12/2022     Creatinine   Date Value Ref Range Status   07/22/2022 1.61 (H) 0.57 - 1.00  "mg/dL Final   07/15/2022 1.35 (H) 0.57 - 1.00 mg/dL Final   07/14/2022 1.53 (H) 0.57 - 1.00 mg/dL Final     eGFR Non  Amer   Date Value Ref Range Status   12/22/2021 57 (L) >60 mL/min/1.73 Final   01/18/2021 51 (L) >60 mL/min/1.73 Final   10/12/2020 46 (L) >60 mL/min/1.73 Final         ASSESSMENT:  Problem List Items Addressed This Visit        Cardiac and Vasculature    Severe tricuspid regurgitation - Primary    Overview     · Echo 10-6-20: There is severe tricuspid regurgitation. Septal motion (ventricular) suggests an RV volume overload.         Essential hypertension    ASD (atrial septal defect)    Overview     a. Surgical ASD repair, 1990, [place]  b. Echo 10/6/2020 Dr. Mccray:  EF 56-60%, Right heart enlargement with severe TR. Septal motion suggests RV volume overload. Septal \"patch\" with no shunting by agitated saline. PA pressure normal to slightly elevated, RA pressure elevated. Mild MR, aortic valve is functionally normal.           Paroxysmal atrial fibrillation (HCC)    Precordial pain    Overview     · MPS 12-21-21: Myocardial perfusion imaging indicates a normal myocardial perfusion study with no evidence of ischemia.            PLAN:    1. CHF/ Severe TR:  HFpEF, continue Jardiance, metoprolol, Bumex  Discussed importance of low-sodium diet and increased exercise    2.   Paroxysmal atrial fib/flutter  Continue metoprolol and Xarelto    3.  Hypertension :  Elevated in office today, repeat blood pressure manually, 142/70, she states that PCP visit 2 weeks ago it was 120 systolic.    4.  CKD:  Stable CKD stage III, complicates all aspects of care, discussed importance of avoiding NSAIDs at least using them sparingly.    5.  History of surgical ASD repair 1990  Last echo reviewed, no evidence of persistent defect    Advance Care Planning   ACP discussion was held with the patient during this visit. Patient does not have an advance directive, information provided.          Follow-up   Return in " about 1 year (around 11/17/2023).    Danielito Henning MD, [facility]  Interventional Cardiology

## 2022-11-17 ENCOUNTER — OFFICE VISIT (OUTPATIENT)
Dept: CARDIOLOGY | Facility: CLINIC | Age: 77
End: 2022-11-17

## 2022-11-17 VITALS
DIASTOLIC BLOOD PRESSURE: 70 MMHG | BODY MASS INDEX: 27.62 KG/M2 | WEIGHT: 176 LBS | HEART RATE: 82 BPM | OXYGEN SATURATION: 97 % | HEIGHT: 67 IN | SYSTOLIC BLOOD PRESSURE: 146 MMHG

## 2022-11-17 DIAGNOSIS — R07.2 PRECORDIAL PAIN: ICD-10-CM

## 2022-11-17 DIAGNOSIS — Q21.10 ASD (ATRIAL SEPTAL DEFECT): ICD-10-CM

## 2022-11-17 DIAGNOSIS — I10 ESSENTIAL HYPERTENSION: ICD-10-CM

## 2022-11-17 DIAGNOSIS — I07.1 SEVERE TRICUSPID REGURGITATION: Primary | ICD-10-CM

## 2022-11-17 DIAGNOSIS — I48.0 PAROXYSMAL ATRIAL FIBRILLATION: ICD-10-CM

## 2022-11-17 PROCEDURE — 99214 OFFICE O/P EST MOD 30 MIN: CPT | Performed by: INTERNAL MEDICINE

## 2022-11-17 RX ORDER — ACETAMINOPHEN AND CODEINE PHOSPHATE 300; 30 MG/1; MG/1
TABLET ORAL AS NEEDED
COMMUNITY
Start: 2022-11-02

## 2023-02-27 ENCOUNTER — OFFICE VISIT (OUTPATIENT)
Dept: CARDIOLOGY | Facility: CLINIC | Age: 78
End: 2023-02-27

## 2023-02-27 VITALS
WEIGHT: 177 LBS | HEIGHT: 67 IN | OXYGEN SATURATION: 96 % | SYSTOLIC BLOOD PRESSURE: 118 MMHG | DIASTOLIC BLOOD PRESSURE: 68 MMHG | BODY MASS INDEX: 27.78 KG/M2 | HEART RATE: 84 BPM

## 2023-02-27 DIAGNOSIS — I07.1 SEVERE TRICUSPID REGURGITATION: ICD-10-CM

## 2023-02-27 DIAGNOSIS — Q21.10 ASD (ATRIAL SEPTAL DEFECT): ICD-10-CM

## 2023-02-27 DIAGNOSIS — I48.0 PAROXYSMAL ATRIAL FIBRILLATION: Primary | ICD-10-CM

## 2023-02-27 DIAGNOSIS — I48.3 TYPICAL ATRIAL FLUTTER: ICD-10-CM

## 2023-02-27 DIAGNOSIS — Z79.01 CHRONIC ANTICOAGULATION: ICD-10-CM

## 2023-02-27 DIAGNOSIS — I10 ESSENTIAL HYPERTENSION: ICD-10-CM

## 2023-02-27 PROCEDURE — 99213 OFFICE O/P EST LOW 20 MIN: CPT | Performed by: INTERNAL MEDICINE

## 2023-02-27 RX ORDER — SYRINGE AND NEEDLE,INSULIN,1ML 31 GX5/16"
SYRINGE, EMPTY DISPOSABLE MISCELLANEOUS 3 TIMES DAILY
COMMUNITY
Start: 2023-02-14

## 2023-02-27 NOTE — PROGRESS NOTES
Cardiac Electrophysiology Outpatient Follow Up Note            [facility]    Follow Up Office Visit      Ivory Carr  [id number]  02/27/2023  [unfilled]  [unfilled]    Primary Care Physician: Christiane Peter APRN    Referred By: No ref. provider found    Subjective     Chief Complaint:   Diagnoses and all orders for this visit:    1. Paroxysmal atrial fibrillation (HCC) (Primary)    2. ASD (atrial septal defect)    3. Essential hypertension    4. Typical atrial flutter (HCC)    5. Severe tricuspid regurgitation    6. Chronic anticoagulation      Chief Complaint   Patient presents with   • Atrial Septal Defect   • Hypertension   • PAF   • TRICUSPID REGURGITATION   • Atrial Flutter       History of Present Illness:   Ivory Carr is a 77 y.o. female who presents to my electrophysiology clinic for follow up of above complaints.  Michael has some lower back pain but really no cardiac complaints.  Never feels her heart racing.  She is now 77 years old she thinks she is in pretty good shape.  Takes her medications faithfully reliably including rivaroxaban..      No chest pain nausea vomit fevers chills syncope presyncope or palpitations    Past Medical History:   Past Medical History:   Diagnosis Date   • ASD (atrial septal defect)    • Atrial flutter (HCC) 9/24/2020    Added automatically from request for surgery [id number]   • CKD (chronic kidney disease) 10/6/2020   • Diabetes (HCC)        Past Surgical History:   Past Surgical History:   Procedure Laterality Date   • CARDIAC CATHETERIZATION N/A 10/9/2020    Procedure: Right Heart Cath;  Surgeon: Irvin Mccray MD;  Location:  [facility];  Service: Cardiology;  Laterality: N/A;   • CARDIAC ELECTROPHYSIOLOGY PROCEDURE N/A 10/15/2020    Procedure: Ablation atrial flutter;  Surgeon: Joseph Garcia DO;  Location:  [facility] EP INVASIVE LOCATION;  Service: Cardiovascular;  Laterality: N/A;   •  "CHOLECYSTECTOMY         Family History:   Family History   Problem Relation Age of Onset   • Hypertension Mother    • Hyperlipidemia Mother    • Diabetes Mother    • Emphysema Father    • Other Son         HEART PROBLEMS   • Diabetes Sister    • Other Brother         HEART PROBLEMS   • Lung disease Sister    • No Known Problems Sister    • Diabetes Sister    • Diabetes Sister    • Other Brother         HEART PROBLEMS   • Other Brother         HEART PROBLEMS   • Other Brother         HEART PROBLEMS   • Other Brother         HEART PROBLEMS       Social History:   Social History     Socioeconomic History   • Marital status:    Tobacco Use   • Smoking status: Never   • Smokeless tobacco: Never   Vaping Use   • Vaping Use: Never used   Substance and Sexual Activity   • Alcohol use: Never   • Drug use: Never   • Sexual activity: Defer       Medications:     Current Outpatient Medications:   •  acetaminophen-codeine (TYLENOL #3) 300-30 MG per tablet, As Needed., Disp: , Rfl:   •  bumetanide (BUMEX) 1 MG tablet, Take 1 tablet by mouth 2 (Two) Times a Day., Disp: 60 tablet, Rfl: 1  •  Jardiance 25 MG tablet tablet, Take 1 tablet by mouth Daily., Disp: , Rfl:   •  metoprolol tartrate (LOPRESSOR) 25 MG tablet, Take 1 tablet by mouth 2 (Two) Times a Day., Disp: 60 tablet, Rfl: 11  •  NovoLIN 70/30 (70-30) 100 UNIT/ML injection, Inject 20 Units under the skin into the appropriate area as directed 3 (Three) Times a Day., Disp: , Rfl:   •  rivaroxaban (XARELTO) 15 MG tablet, Take 1 tablet by mouth Daily., Disp: 30 tablet, Rfl: 1  •  TRUEplus Insulin Syringe 31G X 5/16\" 1 ML misc, 3 (Three) Times a Day., Disp: , Rfl:   •  colchicine 0.6 MG tablet, Take 1 tablet by mouth Daily., Disp: 7 tablet, Rfl: 0    Allergies:   Allergies   Allergen Reactions   • Statins Myalgia       Objective   Vital Signs:   Vitals:    02/27/23 1504   BP: 118/68   BP Location: Right arm   Patient Position: Sitting   Pulse: 84   SpO2: 96%   Weight: " "80.3 kg (177 lb)   Height: 170.2 cm (67.01\")       PHYSICAL EXAM  General appearance: Awake, alert, cooperative  Head: Normocephalic, without obvious abnormality, atraumatic  Eyes: Conjunctivae/corneas clear, EOMs intact  Neck: no adenopathy, no carotid bruit, no JVD and thyroid: not enlarged  Lungs: clear to auscultation bilaterally and no rhonchi or crackles\", ' symmetric  Heart: regular rate and rhythm, S1, S2 normal, no murmur, click, rub or gallop  Abdomen: Soft, non-tender, bowel sounds normal,  no organomegaly  Extremities: extremities normal, atraumatic, no cyanosis or edema  Skin: Skin color, turgor normal, no rashes or lesions  Neurologic: Grossly normal     Lab Results   Component Value Date    GLUCOSE 210 (H) 07/22/2022    CALCIUM 9.2 07/22/2022     (L) 07/22/2022    K 4.4 07/22/2022    CO2 34.0 (H) 07/22/2022    CL 93 (L) 07/22/2022    BUN 21 07/22/2022    CREATININE 1.61 (H) 07/22/2022    EGFRIFNONA 57 (L) 12/22/2021    BCR 13.0 07/22/2022    ANIONGAP 7.0 07/22/2022     Lab Results   Component Value Date    WBC 4.46 07/15/2022    HGB 10.8 (L) 07/15/2022    HCT 34.7 07/15/2022    MCV 85.5 07/15/2022     07/15/2022     Lab Results   Component Value Date    INR 2.03 (H) 07/11/2022    INR 2.20 (H) 10/06/2020    PROTIME 22.9 (H) 07/11/2022    PROTIME 24.2 (H) 10/06/2020     Lab Results   Component Value Date    TSH 3.730 07/11/2022       Cardiac Testing:     I personally viewed and interpreted the patient's EKG/Telemetry/lab data    Procedures    Tobacco Cessation: N/A  Obstructive Sleep Apnea Screening: Completed    Advance Care Planning   ACP discussion was declined by the patient. Patient does not have an advance directive, declines further assistance.       Assessment & Plan    Diagnoses and all orders for this visit:    1. Paroxysmal atrial fibrillation (HCC) (Primary)    2. ASD (atrial septal defect)    3. Essential hypertension    4. Typical atrial flutter (HCC)    5. Severe tricuspid " regurgitation    6. Chronic anticoagulation         Diagnosis Plan   1. Paroxysmal atrial fibrillation (HCC)   paroxysmal A-fib.  Rhythm control strategy.  However doing well in absence of antiarrhythmic drug.  Very few symptoms related now about 18 months since she had significant episodes of A-fib.    Continue metoprolol    Lifelong anticoagulation with Xarelto.      2. ASD (atrial septal defect)   37 years ago.  Surgical repair.  Doing well.      3. Essential hypertension   blood pressure well controlled.      4. Typical atrial flutter (HCC)   ablation of typical and atrial typical flutter performed by myself remotely.  Doing well now.  No recurrence.      5. Severe tricuspid regurgitation   conservative management.      6. Chronic anticoagulation   long-term anticoagulation recommended.     Come back and see me as needed.  Patient has strong preference to come back only on a as needed fashion.     Body mass index is 27.72 kg/m².    I spent 44 minutes in consultation with this patient which included more than 65% of this time in direct face-to-face counseling, physical examination and discussion of my assessment and findings and this shared decision making with the patient.  The remainder of the time not spent face-to-face was performing one, some or all of the following actions: preparing to see the patient (e.g. reviewing tests, prior clinicians' notes, etc), ordering medications, tests or procedures, coordination of care, discussion of the plan with other healthcare providers, documenting clinical information in epic as well as independently interpreting results and communication of these results to the patient family and/or caregiver(s).  Please note that this explicitly excludes time spent on other separate billable services such as performing procedures or test interpretation, when applicable.      Follow Up:       Thank you for allowing me to participate in the care of your patient. Please to not hesitate  to contact me with additional questions or concerns.      Joseph Garcia, DO, FACC, RS  Cardiac Electrophysiologist  [facility] / [facility]

## 2024-11-19 NOTE — THERAPY EVALUATION
[facility]ediatric[facility] Progress Note      Chief Complaint     Follow-up and Health Maintenance       Subjective:      History of Present Illness:  Juan Cruz is a 66 y.o. female  went to [facility]/[place] from Jan through April 2024 and did well except minor viral illness; cough stable; wore N95     RA-ILD with NYHA 2 (failed MTX and ASA) as per Dr. Haile on NO supplemental oxygen:  pt tolerated infusion of Truxima on 5/24/20204  2nd of 2 infusions 15 days apart every 6 mo but felt very tired  with paradoxical insomnia x 2 days since she gets Benadryl and steroids with the infusion  -still has intermittent dry cough but does not interfere with routine activities - able to climb stairs in her home and travel   Saw Dr. Haile on 5/02/2024 who stopped her Breo x 1.5 months; will repeat PFTs and 6MWT in 6 mo   Pulm stress test 5/02/2024: 98% RA and 94% post-exercise with  (rest 85); PFTS 2/2024 stable   Labs: 2/2024 CRP 1.83, ESR 35, B12 617, fasting glucose 106 and 4/2024 CRP 1.8 and ESR 15 with normal CMP, CBC  -O2 sat 98% on RA now denies BAKER, SOB  Had induration of left lateral AC fossa, swelling of wrist and ankle with 1st of 2 recent infusions that has since resolved but had never happened with prior infusions.   -Dr. Holcomb retired and Dr. GALEANA is taking her place     Ig def'y: Ig G 630 (1165) ;  Ig M 44 (114) 4/2024:     Vit D 2/24 level 37:  had Vit D injection in 2/2024 with 2 wks of Vit D 50,000 IU weekly; takes Calcium 600mg daily     , , , HDL 52 2/2024    HbA1C 5.1     Osteoporosis: [facility] DEXA 1/2024:  LS -2.7, left FN -2.4, right FN -2.4, left hip -1.9; right hip -2.1, radius -4.1   -did not tolerate Prolia  3/2023 due to painful skin rash     Needs GYN     Sinus tachycardia: HR 78 on Metoprolol Er 25mg daily     AR: on zyrtec 10mg daily    Vaginal dryness: Vagifem 10mg biweekly     Past Medical History:  Past Medical History:   Diagnosis Date    Bruise  Patient Name: Ivory Carr  : 1945    MRN: [id number]                              Today's Date: 2022       Admit Date: 2022    Visit Dx:     ICD-10-CM ICD-9-CM   1. Typical atrial flutter (HCC)  I48.3 427.32   2. Peripheral edema  R60.9 782.3   3. Hypervolemia, unspecified hypervolemia type  E87.70 276.69   4. Acute on chronic congestive heart failure, unspecified heart failure type (HCC)  I50.9 428.0   5. Elevated troponin  R77.8 790.6     Patient Active Problem List   Diagnosis   • Atrial flutter (HCC)   • Diabetes (HCC)   • Anasarca and ascites   • Anemia   • Stage 3a chronic kidney disease (HCC)   • Severe tricuspid regurgitation   • Essential hypertension   • ASD (atrial septal defect)   • Paroxysmal atrial fibrillation (HCC)   • Precordial pain   • Typical atrial flutter (HCC)   • Elevated troponin   • Acute on chronic respiratory failure (HCC)   • KIMMIE (acute kidney injury) (HCC)     Past Medical History:   Diagnosis Date   • ASD (atrial septal defect)    • Atrial flutter (HCC) 2020    Added automatically from request for surgery [id number]   • CKD (chronic kidney disease) 10/6/2020   • Diabetes (HCC)      Past Surgical History:   Procedure Laterality Date   • CARDIAC CATHETERIZATION N/A 10/9/2020    Procedure: Right Heart Cath;  Surgeon: Irvin Mccray MD;  Location:  [facility]IVE LOCATION;  Service: Cardiology;  Laterality: N/A;   • CARDIAC ELECTROPHYSIOLOGY PROCEDURE N/A 10/15/2020    Procedure: Ablation atrial flutter;  Surgeon: Joseph Garcia DO;  Location:  [facility] INV[facility] LOCATION;  Service: Cardiovascular;  Laterality: N/A;   • CHOLECYSTECTOMY        General Information     Row Name 22 1556          Physical Therapy Time and Intention    Document Type evaluation  -BA     Mode of Treatment physical therapy  -BA     Row Name 22 1556          General Information    Patient Profile Reviewed yes  -BA     Prior Level of Function independent:;all household  09/12/2022    Chronic bilateral low back pain without sciatica 11/20/2024    Chronic sinusitis, unspecified     Dyspnea on exertion 09/16/2022    Elevated liver enzymes 09/12/2022    History of recent steroid use 11/14/2024    History of SIADH     Idiopathic pulmonary fibrosis 09/12/2022    Interstitial pulmonary disease, unspecified 07/20/2023    LLL pneumonia 12/20/2022    Mitral valve prolapse     Mitral valve prolapse 11/06/2024    Osteopenia     Petechiae 09/12/2022    Pulmonary fibrosis 09/12/2022    Rheumatoid arthritis involving multiple sites     Skin rash 03/09/2023    Stiffness in joint 11/14/2024       Past Surgical History:  Past Surgical History:   Procedure Laterality Date    BREAST BIOPSY Bilateral     FUNCTIONAL ENDOSCOPIC SINUS SURGERY (FESS)      ORIF WRIST FRACTURE Right        Allergies:  Review of patient's allergies indicates:   Allergen Reactions    Methotrexate Shortness Of Breath    Gluten protein Other (See Comments)     Sensitive - not allergic    Paxlovid [nirmatrelvir-ritonavir] Other (See Comments)     Trunk / chest / legs peeling after paxlovid    Ppd black rubber mix     Prolia [denosumab] Other (See Comments)     Rash to arms/trunk       Home Medications:  Current Outpatient Medications   Medication Sig Dispense Refill    albuterol sulfate (VENTOLIN HFA INHL) Ventolin HFA      alendronate (FOSAMAX) 70 MG tablet Take 1 tablet (70 mg total) by mouth every 7 days. 4 tablet 11    B-complex with vitamin C (Z-BEC OR EQUIV) tablet Take 1 tablet by mouth 2 (two) times a day.      BREO ELLIPTA 200-25 mcg/dose DsDv diskus inhaler INHALE 1 PUFF BY MOUTH ONCE DAILY (CONTROLLER) 180 each 0    calcium carb/vit A palm/vit D3 (CALCIUM-VITAMIN A-VITAMIN D ORAL) Calcium-Vitamin A-Vitamin D      calcium citrate-vitamin D3 315-200 mg (CITRACAL+D) 315-200 mg-unit per tablet Take 1 tablet by mouth 2 (two) times daily.      cetirizine (ZYRTEC) 10 MG tablet Take 1 tablet (10 mg total) by mouth once daily.  mobility;gait;transfer;bed mobility;ADL's  Pt reported she uses a rollator walker for short distance ambulation w/in her home.  Hx of falls, with no falls w/in the past year.  Indep with ADLs with walk-in shower with shower stool and increased time to complete.  -     Existing Precautions/Restrictions fall;orthostatic hypotension;other (see comments)  monitor BP (appears to have delayed decreases in BP with standing and ambulation)  -     Barriers to Rehab medically complex;previous functional deficit  -     Row Name 07/12/22 1556          Living Environment    People in Home spouse;other (see comments)  son and his family live next door and assist as needed  -     Row Name 07/12/22 1556          Home Main Entrance    Number of Stairs, Main Entrance none;other (see comments)  has ramp  -     Row Name 07/12/22 1556          Stairs Within Home, Primary    Number of Stairs, Within Home, Primary none  -     Row Name 07/12/22 1556          Cognition    Orientation Status (Cognition) oriented x 3  -     Row Name 07/12/22 1556          Safety Issues, Functional Mobility    Safety Issues Affecting Function (Mobility) awareness of need for assistance;insight into deficits/self-awareness;safety precaution awareness;safety precautions follow-through/compliance  -     Impairments Affecting Function (Mobility) balance;coordination;endurance/activity tolerance;pain;postural/trunk control;strength  -           User Key  (r) = Recorded By, (t) = Taken By, (c) = Cosigned By    Initials Name Provider Type     Rosalind Aguilar, PT Physical Therapist               Mobility     Row Name 07/12/22 1600          Bed Mobility    Comment, (Bed Mobility) Received sitting EOB upon arrival.  -     Row Name 07/12/22 1600          Sit-Stand Transfer    Sit-Stand [place] (Transfers) contact guard;verbal cues  -     Assistive Device (Sit-Stand Transfers) walker, front-wheeled  -     Comment, (Sit-Stand Transfer)  30 tablet 5    estradioL (VAGIFEM) 10 mcg Tab Yuvafem 10 mcg vaginal tablet   INSERT 1 TABLET VAGINALLY TWICE A WEEK 45 tablet 5    magnesium oxide (MAG-OX) 400 mg (241.3 mg magnesium) tablet Take 400 mg by mouth 2 (two) times daily.      metoprolol succinate (TOPROL-XL) 25 MG 24 hr tablet Take 1 tablet by mouth once daily 90 tablet 3    RESTASIS 0.05 % ophthalmic emulsion Place 1 drop into both eyes once daily. Has not started       Current Facility-Administered Medications   Medication Dose Route Frequency Provider Last Rate Last Admin    riTUXimab-abbs (TRUXIMA) 1,000 mg in sodium chloride 0.9% 1,000 mL infusion (conc: 1 mg/mL)  1,000 mg Intravenous 1 time in Clinic/HOD Ibis Holcomb MD        riTUXimab-abbs (TRUXIMA) 1,000 mg in sodium chloride 0.9% 1,000 mL infusion (conc: 1 mg/mL)  1,000 mg Intravenous 1 time in Clinic/HOD Ibis Holcomb MD        riTUXimab-abbs (TRUXIMA) 1,000 mg in sodium chloride 0.9% 1,000 mL infusion (conc: 1 mg/mL)  1,000 mg Intravenous 1 time in Clinic/HOD Ibis Holcomb MD        sodium chloride 0.9% flush 10 mL  10 mL Intravenous PRN Ibis Holcomb MD            Family History:  Family History   Problem Relation Name Age of Onset    Breast cancer Neg Hx      Colon cancer Neg Hx      Ovarian cancer Neg Hx      Uterine cancer Neg Hx      Cervical cancer Neg Hx         Social History:  Social History     Tobacco Use    Smoking status: Never     Passive exposure: Never    Smokeless tobacco: Never   Substance Use Topics    Alcohol use: No    Drug use: No       Review of Systems:  Pertinent positives and negatives listed in HPI. All other systems are reviewed and are negative.    Health Maintaince :   Health Maintenance Topics with due status: Not Due       Topic Last Completion Date    Mammogram 04/18/2024    DEXA Scan 06/28/2024    Lipid Panel 11/04/2024           Eye: UTD  Dental: UTD    Immunizations:   Tdap: n/a.  Influenza:  VCs/TCs for sequencing and hand placement.  Reported mild dizziness with increased static standing.  BP stable from sitting /82 to standing 125/81.  -     Row Name 07/12/22 1600          Gait/Stairs (Locomotion)    [place] Level (Gait) contact guard;minimum assist (75% patient effort);verbal cues  -     Assistive Device (Gait) walker, front-wheeled  -     Distance in Feet (Gait) 10  -BA     Deviations/Abnormal Patterns (Gait) bilateral deviations;jack decreased;gait speed decreased;stride length decreased;weight shifting decreased;antalgic  -BA     Bilateral Gait Deviations forward flexed posture;heel strike decreased  -     Comment, (Gait/Stairs) Pt demonstrated step through gait pattern.  Reported c/o increased B ankle pain and progressive dizziness.  VCs/TCs for improved stride length, upright posture, and walker management.  Gait distance limited by fatigue, pain, and dizziness.  BP decreased from standing 125/81 to 112/69 following ambulation and transfer to sitting in chair.  -           User Key  (r) = Recorded By, (t) = Taken By, (c) = Cosigned By    Initials Name Provider Type    Rosalind Aponte, PT Physical Therapist               Obj/Interventions     Row Name 07/12/22 1608          Range of Motion Comprehensive    General Range of Motion bilateral lower extremity ROM WFL  -     Row Name 07/12/22 1608          Strength Comprehensive (MMT)    General Manual Muscle Testing (MMT) Assessment lower extremity strength deficits identified  -     Comment, General Manual Muscle Testing (MMT) Assessment BLE grossly 4-/5.  -     Row Name 07/12/22 1608          Balance    Balance Assessment sitting static balance;sitting dynamic balance;sit to stand dynamic balance;standing static balance;standing dynamic balance  -     Static Sitting Balance independent  -     Dynamic Sitting Balance standby assist  -     Position, Sitting Balance unsupported;sitting edge of bed;sitting in  "UTD.  Pneumovax: UTD  Shingrex x 2: n/a  COVID: needs  Hepatitis C:   Cancer Screening:  PAP: UTD - needs annual gyn exam  Mammogram: UTD 4/2024   Colonoscopy: 10/2021 neg - repeat 10/2024   DEXA:  due now  LDCT: n/a    The 10-year ASCVD risk score (Yohana GARCIA, et al., 2019) is: 5.9%    Values used to calculate the score:      Age: 66 years      Sex: Female      Is Non- : No      Diabetic: No      Tobacco smoker: No      Systolic Blood Pressure: 110 mmHg      Is BP treated: No      HDL Cholesterol: 76 mg/dL      Total Cholesterol: 270 mg/dL      Objective:   /86 (BP Location: Left arm, Patient Position: Sitting)   Pulse 78   Ht 5' 2" (1.575 m)   Wt 59 kg (130 lb 1.1 oz)   LMP  (LMP Unknown)   SpO2 98%   BMI 23.79 kg/m²      Body mass index is 23.79 kg/m².       Physical Examination:  General: Alert and awake in no apparent distress  HEENT: Normocephalic and atraumatic; Tms WNL  Eyes:  PERRL; EOMi with anicteric sclera and clear conjunctivae  Mouth:  Oropharynx clear and without exudate; moist mucous membranes  Neck:   Cervical nodes not enlarged; supple; no bruits  Cardio:  Regular rate and rhythm with normal S1 and S2; no murmurs or rubs  Resp:  CTAB; respirations unlabored; no wheezes, crackles or rhonchi  Abdom: Soft, NTND with normoactive bowel sounds; negative HSM  Extrem: Warm and well-perfused with no clubbing, cyanosis or edema  Skin:  No rashes, lesions, or color changes  Pulses:  2+ and symmetric distally  Neuro:  AAOx3; cooperative and pleasant with no focal deficits    Laboratory:      Most Recent Data:  Lab Results   Component Value Date    WBC 7.99 11/04/2024    HGB 14.1 11/04/2024    HCT 41.4 11/04/2024     11/04/2024    CHOL 270 (H) 11/04/2024    TRIG 82 11/04/2024    HDL 76 (H) 11/04/2024    ALT 21 11/04/2024    AST 24 11/04/2024     11/04/2024    K 4.3 11/04/2024     11/04/2024    BUN 8 11/04/2024    CO2 26 11/04/2024    INR 0.9 09/09/2022    " chair  -BA     Sit to Stand Dynamic Balance contact guard;verbal cues  -BA     Static Standing Balance contact guard;verbal cues  -BA     Dynamic Standing Balance contact guard;minimal assist;verbal cues  -BA     Position/Device Used, Standing Balance supported;walker, front-wheeled  -     Balance Interventions sit to stand;standing;supported;static;dynamic;occupation based/functional task  -     Comment, Balance Mild instability with standing and ambulation activity with FWW.  No overt LOB noted.  Limited by dizziness and B ankle pain.  -[facility] Name 07/12/22 1608          Sensory Assessment (Somatosensory)    Sensory Assessment (Somatosensory) LE sensation intact  -           User Key  (r) = Recorded By, (t) = Taken By, (c) = Cosigned By    Initials Name Provider Type     Rosalind Aguilar, PT Physical Therapist               Goals/Plan     [facility] Name 07/12/22 1617          Bed Mobility Goal 1 (PT)    Activity/Assistive Device (Bed Mobility Goal 1, PT) sit to supine/supine to sit  -BA     [place] Level/Cues Needed (Bed Mobility Goal 1, PT) independent  -BA     Time Frame (Bed Mobility Goal 1, PT) long term goal (LTG);2 weeks  -[facility] Name 07/12/22 1617          Transfer Goal 1 (PT)    Activity/Assistive Device (Transfer Goal 1, PT) sit-to-stand/stand-to-sit;bed-to-chair/chair-to-bed  -BA     [place] Level/Cues Needed (Transfer Goal 1, PT) modified independence  -BA     Time Frame (Transfer Goal 1, PT) long term goal (LTG);2 weeks  -[facility] Name 07/12/22 1617          Gait Training Goal 1 (PT)    Activity/Assistive Device (Gait Training Goal 1, PT) gait (walking locomotion);assistive device use;walker, rolling  -BA     [place] Level (Gait Training Goal 1, PT) standby assist  -BA     Distance (Gait Training Goal 1, PT) 100  -BA     Time Frame (Gait Training Goal 1, PT) long term goal (LTG);2 weeks  -[facility] Name 07/12/22 1617          Therapy Assessment/Plan (PT)    Planned Therapy  HGBA1C 5.4 12/19/2023              CBC:   WBC   Date Value Ref Range Status   11/04/2024 7.99 3.90 - 12.70 K/uL Final     Hemoglobin   Date Value Ref Range Status   11/04/2024 14.1 12.0 - 16.0 g/dL Final     Hematocrit   Date Value Ref Range Status   11/04/2024 41.4 37.0 - 48.5 % Final     Platelets   Date Value Ref Range Status   11/04/2024 295 150 - 450 K/uL Final     MCV   Date Value Ref Range Status   11/04/2024 91 82 - 98 fL Final     RDW   Date Value Ref Range Status   11/04/2024 12.4 11.5 - 14.5 % Final     BMP:   Sodium   Date Value Ref Range Status   11/04/2024 137 136 - 145 mmol/L Final     Potassium   Date Value Ref Range Status   11/04/2024 4.3 3.5 - 5.1 mmol/L Final     Chloride   Date Value Ref Range Status   11/04/2024 104 95 - 110 mmol/L Final     CO2   Date Value Ref Range Status   11/04/2024 26 23 - 29 mmol/L Final     BUN   Date Value Ref Range Status   11/04/2024 8 8 - 23 mg/dL Final     Creatinine   Date Value Ref Range Status   11/04/2024 0.8 0.5 - 1.4 mg/dL Final     Glucose   Date Value Ref Range Status   11/04/2024 97 70 - 110 mg/dL Final     Calcium   Date Value Ref Range Status   11/04/2024 9.6 8.7 - 10.5 mg/dL Final     Magnesium   Date Value Ref Range Status   06/28/2024 1.9 1.6 - 2.6 mg/dL Final     LFTs:   Total Protein   Date Value Ref Range Status   11/04/2024 7.0 6.0 - 8.4 g/dL Final     Albumin   Date Value Ref Range Status   11/04/2024 3.8 3.5 - 5.2 g/dL Final     Total Bilirubin   Date Value Ref Range Status   11/04/2024 0.4 0.1 - 1.0 mg/dL Final     Comment:     For infants and newborns, interpretation of results should be based  on gestational age, weight and in agreement with clinical  observations.    Premature Infant recommended reference ranges:  Up to 24 hours.............<8.0 mg/dL  Up to 48 hours............<12.0 mg/dL  3-5 days..................<15.0 mg/dL  6-29 days.................<15.0 mg/dL       AST   Date Value Ref Range Status   11/04/2024 24 10 - 40 U/L Final  Interventions (PT) balance training;bed mobility training;gait training;home exercise program;motor coordination training;patient/family education;postural re-education;strengthening;transfer training  -           User Key  (r) = Recorded By, (t) = Taken By, (c) = Cosigned By    Initials Name Provider Type    Rosalind Aponte, PT Physical Therapist               Clinical Impression     Row Name 07/12/22 1610          Pain    Pretreatment Pain Rating 0/10 - no pain  -BA     Posttreatment Pain Rating 0/10 - no pain  -     Pain Location - Side/Orientation Bilateral  -     Pain Location - ankle  -     Pre/Posttreatment Pain Comment Pt reported no pain pre and post therapy session.  Reported c/o B ankle pain with standing progressing to 10/10 with ambulation.  RN aware and managing.  -     Pain Intervention(s) Ambulation/increased activity;Repositioned  -     Row Name 07/12/22 1610          Plan of Care Review    Plan of Care Reviewed With patient;spouse  -     Outcome Evaluation PT initial Eval completed.  Pt presents with generalized weakness, dizziness, balance deficits, decreased functional endurance, and decreased indep/safety with functional mobility.  Ambulated 10ft with FWW and CGA progressing to Calixto.  Limited by c/o progressive dizziness and B ankle pain with standing and ambulation.  BP decreased from standing 125/81 to 112/69 following ambulation and transfer to sitting in chair.  Pt warrants skilled IP PT to improve indep with mobility and return to PLOF.  Rec IPR upon d/c for best fxl outcome.  -     Row Name 07/12/22 1610          Therapy Assessment/Plan (PT)    Rehab Potential (PT) good, to achieve stated therapy goals  -     Criteria for Skilled Interventions Met (PT) yes;meets criteria;skilled treatment is necessary  -     Therapy Frequency (PT) daily  -     Row Name 07/12/22 1610          Vital Signs    Pre Systolic BP Rehab 132  sitting EOB  -     Pre Treatment Diastolic      Alkaline Phosphatase   Date Value Ref Range Status   11/04/2024 67 40 - 150 U/L Final     ALT   Date Value Ref Range Status   11/04/2024 21 10 - 44 U/L Final     Coags:   INR   Date Value Ref Range Status   09/09/2022 0.9 0.8 - 1.2 Final     Comment:     Coumadin Therapy:  2.0 - 3.0 for INR for all indicators except mechanical heart valves  and antiphospholipid syndromes which should use 2.5 - 3.5.       FLP:      Lab Results   Component Value Date    CHOL 270 (H) 11/04/2024    CHOL 219 (H) 12/19/2023    CHOL 238 (H) 09/19/2022     Lab Results   Component Value Date    HDL 76 (H) 11/04/2024    HDL 42 12/19/2023    HDL 73 09/19/2022     Lab Results   Component Value Date    LDLCALC 177.6 (H) 11/04/2024    LDLCALC 151.0 12/19/2023    LDLCALC 143.6 09/19/2022     Lab Results   Component Value Date    TRIG 82 11/04/2024    TRIG 130 12/19/2023    TRIG 107 09/19/2022     Lab Results   Component Value Date    CHOLHDL 28.1 11/04/2024    CHOLHDL 19.2 (L) 12/19/2023    CHOLHDL 30.7 09/19/2022      DM:      Hemoglobin A1C   Date Value Ref Range Status   12/19/2023 5.4 4.0 - 5.6 % Final     Comment:     ADA Screening Guidelines:  5.7-6.4%  Consistent with prediabetes  >or=6.5%  Consistent with diabetes    High levels of fetal hemoglobin interfere with the HbA1C  assay. Heterozygous hemoglobin variants (HbS, HgC, etc)do  not significantly interfere with this assay.   However, presence of multiple variants may affect accuracy.     12/22/2022 5.6 4.0 - 5.6 % Final     Comment:     ADA Screening Guidelines:  5.7-6.4%  Consistent with prediabetes  >or=6.5%  Consistent with diabetes    High levels of fetal hemoglobin interfere with the HbA1C  assay. Heterozygous hemoglobin variants (HbS, HgC, etc)do  not significantly interfere with this assay.   However, presence of multiple variants may affect accuracy.       LDL Cholesterol   Date Value Ref Range Status   11/04/2024 177.6 (H) 63.0 - 159.0 mg/dL Final     Comment:     The  BP 82  -BA     Intra Systolic BP Rehab 125  standing  -BA     Intra Treatment Diastolic BP 81  -BA     Post Systolic BP Rehab 112  following ambulation and transfer to chair; 122/75 after a few min of reclined sitting in chair  -BA     Post Treatment Diastolic BP 69  -BA     Pretreatment Heart Rate (beats/min) 70  -BA     Posttreatment Heart Rate (beats/min) 69  -BA     Pre SpO2 (%) 95  -BA     O2 Delivery Pre Treatment room air  -BA     O2 Delivery Intra Treatment room air  -BA     Post SpO2 (%) 98  -BA     O2 Delivery Post Treatment room air  -BA     Pre Patient Position Sitting  -BA     Intra Patient Position Standing  -BA     Post Patient Position Sitting  -BA     Row Name 07/12/22 1610          Positioning and Restraints    Pre-Treatment Position in bed  -BA     Post Treatment Position chair  -BA     In Chair notified nsg;reclined;sitting;call light within reach;encouraged to call for assist;exit alarm on;with family/caregiver;waffle cushion;legs elevated  -BA           User Key  (r) = Recorded By, (t) = Taken By, (c) = Cosigned By    Initials Name Provider Type    Rosalind Aponte, PT Physical Therapist               Outcome Measures     Row Name 07/12/22 1619          How much help from another person do you currently need...    Turning from your back to your side while in flat bed without using bedrails? 3  -BA     Moving from lying on back to sitting on the side of a flat bed without bedrails? 3  -BA     Moving to and from a bed to a chair (including a wheelchair)? 3  -BA     Standing up from a chair using your arms (e.g., wheelchair, bedside chair)? 3  -BA     Climbing 3-5 steps with a railing? 2  -BA     To walk in hospital room? 3  -BA     AM-PAC 6 Clicks Score (PT) 17  -BA     Highest level of mobility 5 --> Static standing  -BA     Row Name 07/12/22 1619          Functional Assessment    Outcome Measure Options AM-PAC 6 Clicks Basic Mobility (PT)  -BA           User Key  (r) = Recorded By, (t) =  "National Cholesterol Education Program (NCEP) has set the  following guidelines (reference values) for LDL Cholesterol:  Optimal.......................<130 mg/dL  Borderline High...............130-159 mg/dL  High..........................160-189 mg/dL  Very High.....................>190 mg/dL       Creatinine   Date Value Ref Range Status   11/04/2024 0.8 0.5 - 1.4 mg/dL Final     Thyroid: No results found for: "TSH", "FREET4", "P2MPNPJ", "W4RSUWJ", "THYROIDAB"  Anemia:   Vitamin B-12   Date Value Ref Range Status   11/14/2024 768 180 - 914 ng/L Final     Comment:     Test Performed by:  [facility]  [address] [place] [address]  : Mela Hdz Ph.D.; CLIA# [id number]       Cardiac:   Troponin I   Date Value Ref Range Status   02/10/2023 <0.006 0.000 - 0.026 ng/mL Final     Comment:     The reference interval for Troponin I represents the 99th percentile   cutoff   for our facility and is consistent with 3rd generation assay   performance.       BNP   Date Value Ref Range Status   02/10/2023 <10 0 - 99 pg/mL Final     Comment:     Values of less than 100 pg/ml are consistent with non-CHF populations.     Urinalysis:   Color, UA   Date Value Ref Range Status   12/10/2023 Colorless (A) Yellow, Straw, Krystal Final     Specific Gravity, UA   Date Value Ref Range Status   12/10/2023 <1.005 (A) 1.005 - 1.030 Final     Nitrite, UA   Date Value Ref Range Status   12/10/2023 Negative Negative Final     Ketones, UA   Date Value Ref Range Status   12/10/2023 1+ (A) Negative Final     Urobilinogen, UA   Date Value Ref Range Status   12/10/2023 Negative <2.0 EU/dL Final       Other Results:  EKG (my interpretation):     Radiology:  FL Esophagram Complete  Narrative: EXAMINATION:  FL ESOPHAGRAM COMPLETE    CLINICAL HISTORY:  Gastro-esophageal reflux disease without esophagitis    TECHNIQUE:  Fluoroscopic evaluation and spot images of the esophagus were " Taken By, (c) = Cosigned By    Initials Name Provider Type     Rosalind Aguilar, SADE Physical Therapist                             Physical Therapy Education                 Title: PT OT SLP Therapies (In Progress)     Topic: Physical Therapy (In Progress)     Point: Mobility training (Done)     Learning Progress Summary           Patient Acceptance, E, VU,NR by MARYJANE at 7/12/2022 1619                   Point: Home exercise program (Not Started)     Learner Progress:  Not documented in this visit.          Point: Body mechanics (Done)     Learning Progress Summary           Patient Acceptance, E, VU,NR by MARYJANE at 7/12/2022 1619                   Point: Precautions (Done)     Learning Progress Summary           Patient Acceptance, E, VU,NR by MARYJANE at 7/12/2022 1619                               User Key     Initials Effective Dates Name Provider Type Discipline     09/21/21 -  Rosalind Aguilar PT Physical Therapist PT              PT Recommendation and Plan  Planned Therapy Interventions (PT): balance training, bed mobility training, gait training, home exercise program, motor coordination training, patient/family education, postural re-education, strengthening, transfer training  Plan of Care Reviewed With: patient, spouse  Outcome Evaluation: PT initial Eval completed.  Pt presents with generalized weakness, dizziness, balance deficits, decreased functional endurance, and decreased indep/safety with functional mobility.  Ambulated 10ft with FWW and CGA progressing to Calixto.  Limited by c/o progressive dizziness and B ankle pain with standing and ambulation.  BP decreased from standing 125/81 to 112/69 following ambulation and transfer to sitting in chair.  Pt warrants skilled IP PT to improve indep with mobility and return to PLOF.  Rec IPR upon d/c for best fxl outcome.     Time Calculation:    PT Charges     Row Name 07/12/22 1514             Time Calculation    Start Time 1526  -      PT Received On 07/12/22   -BA      PT Goal Re-Cert Due Date 07/22/22  -BA              Untimed Charges    PT Eval/Re-eval Minutes 62  -BA              Total Minutes    Untimed Charges Total Minutes 62  -BA       Total Minutes 62  -BA            User Key  (r) = Recorded By, (t) = Taken By, (c) = Cosigned By    Initials Name Provider Type    Rosalind Aponte, PT Physical Therapist              Therapy Charges for Today     Code Description Service Date Service Provider Modifiers Qty    19627649091 HC PT EVAL MOD COMPLEXITY 4 7/12/2022 Rosalind Aguilar, PT GP 1          PT G-Codes  Outcome Measure Options: AM-PAC 6 Clicks Basic Mobility (PT)  AM-PAC 6 Clicks Score (PT): 17    Rosalind Aguilar, PT  7/12/2022     obtained.  Fluoroscopic and rapid sequence spot radiographic evaluation of the hypopharynx and cervical esophagus were obtained as well.    Contrast material: Barium sulfate suspension    Fluoroscopy time: 1.6 minutes    COMPARISON:  None    FINDINGS:  Swallowing: The oral and pharyngeal stages of swallowing demonstrate mild laryngeal penetration.  No aspiration.  No pharyngeal mass.    Esophagus: The esophagus is normal in contour and caliber without evidence of masses or strictures. Normal esophageal motility is noted.    Gastroesophageal reflux: Mild gastroesophageal reflux observed.    Other findings: Small hiatal hernia demonstrated.  The gastric cardia is unremarkable in appearance.  Impression: Normal esophagus without evidence of dysmotility or stricture.    Mild laryngeal penetration on stages of swallowing without evidence for aspiration.    Mild gastroesophageal reflux.    Small hiatal hernia.    Electronically signed by: Serafin Borjas  Date:    08/29/2024  Time:    08:58          Assessment/Plan     Juan Cruz is a 66 y.o. female with:  1. Gastroesophageal reflux disease without esophagitis  Assessment & Plan:  Minimize use of PPI and change to Pepcid prn  Initiate GERD precautions ie lose weight, avoid eating 3 hours prior to bed, minimize caffeine, alcohol, tobacco, spicy, greasy, fatty foods; avoid peppermint chocolates, citrus and other acidic foods. Increase exercise to help with digestion and avoid lying down immediately after eating.  Elevate head of bed if GERD awakens pt from sleep.  Eat 6 small snacks rather than 3 large meals.      Refer to dr. Johnny Gaspar for EGD    Orders:  -     Ambulatory referral/consult to Gastroenterology; Future; Expected date: 11/27/2024    2. Rheumatoid arthritis involving multiple sites with positive rheumatoid factor  Overview:  11/15/2022:  ESR 36 (48),  CRP 19 (14), WBC 15.26  2/2023: ESR 22, CRP 2.3    Failed MTX and ASA  2/2024 CRP 1.83, ESR 35, B12 617,  fasting glucose 106 and 4/2024 CRP 1.8 and ESR 15 with normal CMP, CBC    Unusual presentation of interstitial lung disease with rheumatoid arthritis serology and arthralgia though no synovitis; but now reports less musc-sk pain after rituximab    Assessment & Plan:  Continue rituximab but decrease to 1000 q6m and possibly lower if remains stable   next infusion in May 2025; next CT chest  and PFTs in 4/2025; last TTE 10/2023 WNL      Will plan follow up 3 mo with labs with Dr. Feliciano       3. Age-related osteoporosis without current pathological fracture  Overview:  Indications: History of Fracture (Adult), Family history of osteoporosis, Post-menopause, , Height Loss   Comparison: DEXA scan 7/3/2019 - done 5/2022  Treatments: Exercises 3 or more times a week, Vitamin D supplementation, Calcium supplementation    A DEXA scan was performed on the lumbar spine and both hips.    The average trabecular bone mineral density of the lumbar spine from L2-L4 was 0.880 g/cm2. This represents a T-score of -2.7 and a Z-score of  -0.9. Previous T score of -2.8 on 7/3/2019    The average trabecular bone mineral density measured at the proximal femurs was 0.778 g/cm2. This represents a T-score of -1.8 and Z-score of -0.4. Previous T score of -1.7 on 7/3/2019    FRAX Result (10 year probability of fracture):  Major osteoporotic fracture: 11%  Hip fracture: 2.1%      Ilda DEXA 1/2024:  LS -2.7, left FN -2.4, right FN -2.4, left hip -1.9; right hip -2.1, radius -4.1   -did not tolerate Prolia  3/2023 due to painful skin rash       Assessment & Plan:  She is considering starting abaloparatide as needs anabolic for high fracture risk vs Evenity   Cont Vit D, weight-bearing, citracal max plus      4. Polyuria  Assessment & Plan:  Avoid hydration within 3 hours of sleeping including fruit       5. Long-term use of immunosuppressant medication  Assessment & Plan:  Cont to monitor on Truxima - pt refuses all immunizations at this  time       6. Interstitial lung disease  Overview:  TTE 10/2023 WNL    4/2022 Spirometry is normal. Lung volumes show mild-moderate restriction is present. Airway mechanics are abnormal showing increased airway resistance and decreased conductance. DLCO is moderately decreased    CT chest 12/09/2022: Diffuse subpleural reticular opacity and nodular pleural thickening.  Minimal perihilar bronchiectasis.  No evidence of honeycombing.  No focal opacity.  No pleural thickening.  Dx: Diffuse nonspecific interstitial lung disease, possible UIP pattern    Had Lung toxicity with MTX, intolerance of aza, and hepatotoxicity to OFEV.      Assessment & Plan:  F/y Dr. Haile at [facility] clinic;   Cont decreased dose Truxima 1000mg doses every 6 mo with next infusion in 5/2025  -might be switched to mycophenolate mofetil in future     F/u Dr. Haile 4/2025 with CT chest  and PFTs in 4/2025; last TTE 10/2023 WNL      7. Back stiffness  Assessment & Plan:  Cont stretching exercises, yoga, deep tissue massage  Consider dry needling. At [facility] on [address] in [place]    Orders:  -     Ambulatory referral/consult to Physical/Occupational Therapy; Future; Expected date: 11/20/2024               I spent a total of 42minutes on the day of the visit.This includes face to face time and non-face to face time preparing to see the patient (eg, review of tests), obtaining and/or reviewing separately obtained history, documenting clinical information in the electronic or other health record, independently interpreting results and communicating results to the patient/family/caregiver, or care coordinator.   Code Status:     Gianna Carpenter MD

## 2025-02-10 NOTE — CONSULTS
"          [facility] at [facility] - Cardiology Consult    Ivory Carr  1945  [id number]    Admit Date:  10/6/2020    Consult Date:  10/07/20        PCP:  Christiane Peter APRN Req MD:  Dr. Chavez - Hospitalist  Consulting MD:  Dr. Irvin Mccray - Cardiologist        CC:  Anasarca and Ascites    Reason for Consult: Acute CHF and \"abnormal echo\" with severe TR      PROBLEM LIST:   1. Paroxysmal Atrial Flutter  a. Diagnosed approximately 2017  b. ECV x3, last in March 2020  c. Echo 7/2020: biatrial enlargement, normal LVSF with abnormal septal motion;  moderately enlarged RV with normal contractility, mild MR, mod TR  d. Recurrent atrial flutter with RVR August and September 2020   2. History of ASD    A.  surgical ASD repair, 1990, [place]    B.  Echo 10/6/2020 Dr. Mccray:  EF 56-60%, Right heart enlargement with severe TR.  Septal motion suggests RV volume overload.  PA pressure normal to slightly elevated.  Mild MR, aortic valve is functionally normal.  3. Essential Hypertension   4. Hyperlipidemia; intolerant to Lipitor   5. IDDM2 with peripheral neuropathy   6. Varicose veins   7. Lumbar spine disease  8. Cholecystitis/pancreatitis, s/p cholecystectomy August 2020   9. Anasarca and ascites               Allergies:  is allergic to statins.    Medications Prior to Admission   Medication Sig Dispense Refill Last Dose   • aspirin 81 MG EC tablet Take 81 mg by mouth Daily.      • furosemide (LASIX) 20 MG tablet Take 20 mg by mouth 2 (Two) Times a Day.      • insulin glargine (LANTUS) 100 UNIT/ML injection Inject  under the skin into the appropriate area as directed Daily.      • lisinopril (PRINIVIL,ZESTRIL) 10 MG tablet Take 10 mg by mouth Daily.      • rivaroxaban (XARELTO) 20 MG tablet Take 20 mg by mouth Daily.                   CARDIAC RISK FACTORS:   advanced age (older than 55 for men, 65 for women), diabetes mellitus and sedentary lifestyle.         HPI:  Ivory Carr is a 76 yo CF who " Wt Readings from Last 3 Encounters:   02/10/25 91.6 kg (202 lb)   01/29/25 90.7 kg (200 lb)   01/13/25 89.4 kg (197 lb)   Reviewed with the patient the cardiology visit she is stable weight wise She has no swelling and no chest pain or shortness of breath continue lasix cozaar, metoprolol and aldactone  Followup with cardiology as directed                presents to [facility] ED with generalized weakness, progressive abdominal distension and generalized edema, as well as dysuria.  She reports a cholecystectomy August 2020 at [facility] and had an incisional infection treated with outpatient ABX.  The swelling and distension has been present ever since her surgery with an approximate weight gain of 10#.  Labs upon arrival reveal Na 120, Cr 1.18.  UA shows an acute infection.  CT Abd/Pelvis notes ascites with diffuse anasarca. She is afebrile without leukocytosis, HR 64, /95. She has been admitted to the hospitalist. GI has consulted with Mrs. Carr and is planning on a diagnostic paracentesis today.  CT shows normal size liver with normal INR/Plt count - ruling against intrinsic hepatic disease.      Dr. Mccray has been asked to see Mrs. Carr in consultation for acute CHF and severe TR noted on her echo performed yesterday.  Dr. Mccray actually saw Mrs. Carr in office consultation on September 24 for a second opinion on her persistnet atrial flutter. It was noted at that initial visit that she had previously undergone ECV x3, the last one in March 2020.  She has failed several AA medications but cannot recall names.  When her heart is in atrial flutter, it tends to be rapid and causes fatigue and dyspnea.  EKG at time of office visit confirmed atrial flutter with  bpm. She was actually able to be evaluated by EP the same day - Dr. Garcia.  He has tentatively scheduled her for an EPS +/- RFA Atrial Flutter on October 15th.  Since admission yesterday, her rates have been well controlled while still in Atrial Flutter.  She has remained stable since admission. She denies chest pain, denies cough. BLE edema has been present since post cholecystectomy.          Social History     Socioeconomic History   • Marital status:      Spouse name: Not on file   • Number of children: Not on file   • Years of education: Not on file   • Highest education  "level: Not on file   Tobacco Use   • Smoking status: Never Smoker   • Smokeless tobacco: Never Used   Substance and Sexual Activity   • Alcohol use: Never     Frequency: Never   • Drug use: Never   • Sexual activity: Defer     Family History   Problem Relation Age of Onset   • Hypertension Mother    • Hyperlipidemia Mother      Past Surgical History:   Procedure Laterality Date   • CHOLECYSTECTOMY       ROS: Pertinent items are noted in HPI, all other systems reviewed and negative     Objective     height is 172.7 cm (68\") and weight is 89.4 kg (197 lb). Her axillary temperature is 97.7 °F (36.5 °C). Her blood pressure is 137/64 and her pulse is 89. Her respiration is 18 and oxygen saturation is 96%.      Intake/Output Summary (Last 24 hours) at 10/7/2020 1130  Last data filed at 10/7/2020 0517  Gross per 24 hour   Intake 120 ml   Output 2150 ml   Net -2030 ml         Physical Exam:  General Appearance:    Alert, cooperative, in no acute distress   Head:    Normocephalic, without obvious abnormality, atraumatic   Eyes:            Lids and lashes normal, conjunctivae and sclerae normal, no   icterus, no pallor, corneas clear, PERRLA   Ears:    Ears appear intact with no abnormalities noted   Throat:   No oral lesions, no thrush, oral mucosa moist   Neck:   No adenopathy, supple, trachea midline, no thyromegaly, no     carotid bruit, no JVD   Back:     No kyphosis present, no scoliosis present, no skin lesions,       erythema or scars, no tenderness to percussion or                   palpation,   range of motion normal   Lungs:     Clear to auscultation,respirations regular, even and                   unlabored    Heart:    Irregular rhythm and normal rate, normal S1 and S2, no            murmur, no gallop, no rub, no click       Abdomen:     Normal bowel sounds, no masses, no organomegaly, mildly      tender,  + distension, no guarding, no rebound                 tenderness       Extremities:   Moves all extremities " well,  no cyanosis, no redness, 1+ edema   Pulses:   Pulses palpable and equal bilaterally   Skin:   No bleeding, bruising or rash   Lymph nodes:   No palpable adenopathy   Neurologic:   Cranial nerves 2 - 12 grossly intact, sensation intact, DTR        present and equal bilaterally          Results Review:  I personally reviewed the patient's clinical results.  Results from last 7 days   Lab Units 10/06/20  0052   WBC 10*3/mm3 7.77   HEMOGLOBIN g/dL 10.1*   HEMATOCRIT % 31.2*   PLATELETS 10*3/mm3 342     Results from last 7 days   Lab Units 10/06/20  0052   SODIUM mmol/L 120*   POTASSIUM mmol/L 4.6   CHLORIDE mmol/L 83*   CO2 mmol/L 27.0   BUN mg/dL 29*   CREATININE mg/dL 1.18*   CALCIUM mg/dL 8.8   BILIRUBIN mg/dL 0.3   ALK PHOS U/L 99   ALT (SGPT) U/L 14   AST (SGOT) U/L 23   GLUCOSE mg/dL 217*       Results from last 7 days   Lab Units 10/06/20  0052   INR  2.20*             Results from last 7 days   Lab Units 10/06/20  0052   PROBNP pg/mL 2,518.0*       Radiology:  Imaging Results (Last 72 Hours)     Procedure Component Value Units Date/Time    XR Chest 1 View [[id number]] Collected: 10/06/20 0211     Updated: 10/06/20 0213    Narrative:      CR Chest 1 Vw    INDICATION:   Shortness of air. Edema.     COMPARISON:    None available.    FINDINGS:  Single portable AP view(s) of the chest.  Heart is enlarged status post CABG. There is a small right pleural effusion with atelectasis at the right lung base. Lungs otherwise are clear. No pneumothorax is seen.      Impression:      Cardiomegaly with a small right effusion and right base atelectasis.    Signer Name: Danielito Plascencia MD   Signed: 10/6/2020 2:11 AM   Workstation Name: [email or web address]    [facility]    CT Abdomen Pelvis Without Contrast [[id number]] Collected: 10/06/20 0206     Updated: 10/06/20 0209    Narrative:      CT Abdomen Pelvis WO    INDICATION:   Abdominal pain with distention. Nausea.    TECHNIQUE:   CT of the abdomen and pelvis  "without IV contrast. Coronal and sagittal reconstructions were obtained.  Radiation dose reduction techniques included automated exposure control or exposure modulation based on body size. Count of known CT and cardiac nuc  med studies performed in previous 12 months: 0.     COMPARISON:   None available.    FINDINGS:  There is a small right pleural effusion. There is atelectasis in the right lung base. There is atherosclerotic disease but no aortic aneurysm. No renal or ureteral stones are seen. There is no hydronephrosis. Unenhanced solid abdominal organs are grossly  normal. Gallbladder is surgically absent. There is a small amount of ascites in the abdomen and pelvis. Urinary bladder has a thickened wall concerning for cystitis. Solid pelvic organs are grossly normal. The appendix is normal. The colon is normal as  well. There is some gas in nondistended small bowel loops which may reflect a mild ileus. There is diffuse degenerative disease in the lumbar spine. There is mild anasarca.      Impression:      1. Thick-walled urinary bladder concerning for cystitis.  2. Small amount of ascites with generalized anasarca.  3. Mild ileus pattern in the small bowel with no definite obstruction. The appendix is normal.  4. No renal or ureteral stones. No hydronephrosis.  5. Small right pleural effusion.  6. Cholecystectomy.          Signer Name: Danielito Plascencia MD   Signed: 10/6/2020 2:06 AM   Workstation Name: [email or web address]    [facility]            Tele:  Atrial Flutter    Assessment/Plan     1.  \"Abnormal Echo\" with Severe TR   -  Echo 10/6/2020.  Noted in setting of persistent atrial flutter with RVR at times and now anasarca/ascites with a history of ASD repair.     -  Could consider repeating limited echo post ablation.  Patient does not need surgical consultation for valve.    2.  Persistent Atrial Flutter   -  Remote ECV x 3 with most recent March 2020.   -  At times RVR symptomatic with " "fatigue and dyspnea   -  Rates currently controlled and patient with minimal symptoms.  She does express some CHF symptoms of BLE edema, orthopnea but these may also be related to #3.   -  On Xarelto for stroke prevention.   -  Thyroid function to be checked.    3.  Anasarca/Ascities   -  Presents with severe abdominal distension and discomfort.    -  Last BM 10/6/2020 without change in habit   -  CT Abd/Pelvis reveals anasarca and ascites.  Plan to go for paracentesis today, GI following.    4.  Hyponatremia    5.  Diabetes Mellitus II   -  Per MDs.    It is likely that she has PA HTN (underestimated by echo) with severe TR from \"late closure of an ASD.     She needs ablation of atrial flutter (already scheduled)  She needs right heart cath Friday.    Discussed with the patient and her . They agree.       I discussed the patients findings and my recommendations with the patient, any present family members, and the nursing staff.  Irvin Mccray MD saw and examined patient, verified hx and PE, read all radiographic studies, reviewed labs and micro data, and formulated dx, plan for treatment and all medical decision making.      Radha Miramontes PA-C, working with Irvin Mccray MD  10/07/20 11:30 EDT      "

## (undated) DEVICE — CATH ULTRASND ECHO ACUNAV FOR ACUSON 8F 90CM

## (undated) DEVICE — ST INF PRI SMRTSTE 20DRP 2VLV 24ML 117

## (undated) DEVICE — ABLATION CATHETER: Brand: INTELLANAV MIFI™ OPEN-IRRIGATED

## (undated) DEVICE — SKIN PREP TRAY W/CHG: Brand: MEDLINE INDUSTRIES, INC.

## (undated) DEVICE — INTRO SHEATH ENGAGE W/50 GW .038 8F12

## (undated) DEVICE — PK CATH CARD 10

## (undated) DEVICE — KT MANIFOLD CATHLAB CUST

## (undated) DEVICE — LOCATION REFERENCE PATCH KIT: Brand: RHYTHMIA™ MAPPING SYSTEM

## (undated) DEVICE — LIMB HOLDER, WRIST/ANKLE: Brand: DEROYAL

## (undated) DEVICE — ANGIO-SEAL VIP VASCULAR CLOSURE DEVICE: Brand: ANGIO-SEAL

## (undated) DEVICE — ADULT, W/LG. BACK PAD, RADIOTRANSPARENT ELEMENT AND LEAD WIRE: Brand: DEFIBRILLATION ELECTRODES

## (undated) DEVICE — SET PRIMARY GRVTY 10DP MALE LL 104IN

## (undated) DEVICE — Device: Brand: WEBSTER CS

## (undated) DEVICE — LEX ELECTRO PHYSIOLOGY: Brand: MEDLINE INDUSTRIES, INC.

## (undated) DEVICE — ST EXT IV SMARTSITE 2VLV SP M LL 5ML IV1

## (undated) DEVICE — GW J TP FIX CORE .035 150

## (undated) DEVICE — HIGH RESOLUTION MAPPING CATHETER: Brand: INTELLAMAP ORION™

## (undated) DEVICE — SOL NACL 0.9PCT 1000ML

## (undated) DEVICE — INTRO SHEATH ENGAGE W/50 GW .038 9F12

## (undated) DEVICE — DECANT BG O JET

## (undated) DEVICE — DIAGNOSTIC ELECTRODE CATHETER: Brand: WOVEN

## (undated) DEVICE — DRSNG SURESITE123 4X4.8IN

## (undated) DEVICE — SWAN-GANZ THERMODILUTION CATHETER: Brand: SWAN-GANZ

## (undated) DEVICE — GW FC J TFE/COAT .025 3MM 180CM

## (undated) DEVICE — CANN NASL CO2 DIVIDED A/

## (undated) DEVICE — INTRO STEER AGILIS NXT MD/CURL 8.5F 61CM